# Patient Record
Sex: FEMALE | Race: ASIAN | NOT HISPANIC OR LATINO | Employment: FULL TIME | URBAN - METROPOLITAN AREA
[De-identification: names, ages, dates, MRNs, and addresses within clinical notes are randomized per-mention and may not be internally consistent; named-entity substitution may affect disease eponyms.]

---

## 2017-07-27 ENCOUNTER — OFFICE VISIT - HEALTHEAST (OUTPATIENT)
Dept: FAMILY MEDICINE | Facility: CLINIC | Age: 27
End: 2017-07-27

## 2017-07-27 DIAGNOSIS — R35.0 URINE FREQUENCY: ICD-10-CM

## 2020-02-17 ENCOUNTER — OFFICE VISIT - HEALTHEAST (OUTPATIENT)
Dept: FAMILY MEDICINE | Facility: CLINIC | Age: 30
End: 2020-02-17

## 2020-02-17 DIAGNOSIS — J06.9 VIRAL URI WITH COUGH: ICD-10-CM

## 2020-02-17 DIAGNOSIS — R07.0 THROAT PAIN: ICD-10-CM

## 2020-02-17 LAB — DEPRECATED S PYO AG THROAT QL EIA: NORMAL

## 2020-02-17 ASSESSMENT — MIFFLIN-ST. JEOR: SCORE: 1567.62

## 2020-02-18 LAB — GROUP A STREP BY PCR: NORMAL

## 2021-05-31 VITALS — WEIGHT: 168.9 LBS

## 2021-06-04 VITALS
BODY MASS INDEX: 41.33 KG/M2 | OXYGEN SATURATION: 96 % | WEIGHT: 205 LBS | HEART RATE: 95 BPM | HEIGHT: 59 IN | RESPIRATION RATE: 16 BRPM | DIASTOLIC BLOOD PRESSURE: 76 MMHG | SYSTOLIC BLOOD PRESSURE: 114 MMHG | TEMPERATURE: 98.2 F

## 2021-06-06 NOTE — PROGRESS NOTES
"Chief Complaint   Patient presents with     Sore Throat     x1wk, cough, stuffy nose, chest tightness       ASSESSMENT & PLAN:   Diagnoses and all orders for this visit:    Viral URI with cough    Throat pain  -     Rapid Strep A Screen-Throat  -     Group A Strep, RNA Direct Detection, Throat        MDM:  Vital signs, history, and presentation with normal lung sounds consistent with viral URI.    Supportive care discussed.  See discharge instructions below for specific recommendations given.    At the end of the encounter, I discussed results, diagnosis, medications. Discussed red flags for immediate return to clinic/ER, as well as indications for follow up if no improvement. Patient and/or caregiver understood and agreed to plan. Patient was stable for discharge.    SUBJECTIVE    HPI:  HPI  Michelle Ramirez presents to the walk-in clinic with   Chief Complaint   Patient presents with     Sore Throat     x1wk, cough, stuffy nose, chest tightness     Agree with above     Not pregnant.      No fevers     Associated with: ST 4/10    Symptoms started: 1 week ago    Denies: shortness of breath, chronic medical conditions.    Known exposures: none specific     See ROS for additional symptoms and/or pertinent negatives.       History obtained from the patient.  Friend interpreting at times.      No past medical history on file.    There are no active non-hospital problems to display for this patient.      No family history on file.    Social History     Tobacco Use     Smoking status: Never Smoker     Smokeless tobacco: Never Used   Substance Use Topics     Alcohol use: Not on file       Review of Systems   All other systems reviewed and are negative.      OBJECTIVE    Vitals:    02/17/20 1509   BP: 114/76   Patient Site: Right Arm   Patient Position: Sitting   Cuff Size: Adult Large   Pulse: 95   Resp: 16   Temp: 98.2  F (36.8  C)   TempSrc: Oral   SpO2: 96%   Weight: 205 lb (93 kg)   Height: 4' 11.45\" (1.51 m)       Physical " Exam  Constitutional:       Appearance: She is well-developed.   HENT:      Right Ear: Tympanic membrane normal.      Left Ear: Tympanic membrane normal.      Nose: Congestion and rhinorrhea present.      Mouth/Throat:      Pharynx: Posterior oropharyngeal erythema present.      Tonsils: No tonsillar exudate or tonsillar abscesses. 2+ on the right. 2+ on the left.   Eyes:      General:         Right eye: No discharge.         Left eye: No discharge.      Conjunctiva/sclera: Conjunctivae normal.   Cardiovascular:      Rate and Rhythm: Normal rate.   Pulmonary:      Effort: Pulmonary effort is normal.      Breath sounds: Normal breath sounds.   Musculoskeletal:      Comments: Normal gait    Lymphadenopathy:      Cervical: No cervical adenopathy.   Skin:     General: Skin is warm and dry.   Neurological:      Mental Status: She is alert and oriented to person, place, and time.   Psychiatric:         Mood and Affect: Mood normal.         Behavior: Behavior normal.         Thought Content: Thought content normal.         Judgment: Judgment normal.         Labs:  Recent Results (from the past 240 hour(s))   Rapid Strep A Screen-Throat   Result Value Ref Range    Rapid Strep A Antigen No Group A Strep detected, presumptive negative No Group A Strep detected, presumptive negative         Radiology:    No results found.    PATIENT INSTRUCTIONS:   Patient Instructions   Viral illness:     Robitussin DM cough syrup     Ibuprofen as needed for sore throat     Tea with 1 tsp honey      Cough drops     Return if fevers, shortness of breath, or throat pain worsening.

## 2021-06-06 NOTE — PATIENT INSTRUCTIONS - HE
Viral illness:     Robitussin DM cough syrup     Ibuprofen as needed for sore throat     Tea with 1 tsp honey      Cough drops     Return if fevers, shortness of breath, or throat pain worsening.

## 2021-06-12 NOTE — PROGRESS NOTES
Subjective:   Michelle Ramirez is a 27 y.o. female and [facility].  Roomed by: Laverne    Accompanied by  Sister    services provided by: Agency  [employer] [phone]   /Agency Name [employer]      Chief Complaint   Patient presents with     Possible UTI     x couple days.  STates she has urine frequency.   Denies any urinary urgency or dysuria. Has not had a UTI before. She does not have any primary care provider. She denies drinking caffeine drinks, usually drinks water. Denies any recent urinary frequency, urgency, dysuria, fever, chills or flank pain. Denies recent vaginal discharge or itching. Denies fatigue and say appetite has been normal. Denies changing either her clothes or bathing soap.   Denies nausea, vomiting, diarrhea or belly pain.     PMH - Denies any chronic medical conditions  PSH - non  FX - no HTN, DM, Cancer, CAD  Review of Systems  Const -  - see HPI  No Known Allergies  No current outpatient prescriptions on file.  There is no problem list on file for this patient.    Medical History Reviewed  Objective:     Vitals:    07/27/17 1652   BP: 120/70   Pulse: 69   Resp: 16   Temp: 99.6  F (37.6  C)   TempSrc: Oral   SpO2: 100%   Weight: 168 lb 14.4 oz (76.6 kg)   Gen - Pt in NAD  Flanks - non TTP  Results for orders placed or performed in visit on 07/27/17   Urinalysis-UC if Indicated   Result Value Ref Range    Color, UA Yellow Colorless, Yellow, Straw, Light Yellow    Clarity, UA Clear Clear    Glucose, UA Negative Negative    Bilirubin, UA Negative Negative    Ketones, UA Negative Negative    Specific Gravity, UA 1.020 1.005 - 1.030    Blood, UA Negative Negative    pH, UA 7.0 5.0 - 8.0    Protein, UA Negative Negative mg/dL    Urobilinogen, UA 1.0 E.U./dL 0.2 E.U./dL, 1.0 E.U./dL    Nitrite, UA Negative Negative    Leukocytes, UA Negative Negative   Microscopic not indicated  UC not indicated  Lab result discussed on day of visit.      Assessment -  Plan   Medical Decision Making -27-year-old new patient presenting with urinary frequency but no other symptoms.  Discussed that her urinalysis was negative for UTI.  Patient has no vaginal symptoms and answered negative to sensitive activities to either close or bathing soaps.  Discussed the patient that there are many things that can give a person the sensation of urinary frequency, and discussed those bladder irritants with her that are listed below.  Advised her to follow-up with 1 of the primary care providers for further concerns.    1. Urine frequency  - Urinalysis-UC if Indicated    At the conclusion of the encounter, assessment and plan were discussed. All questions were answered. The patient or guardian acknowledged understanding and was involved in the decision making regarding the overall care plan.    Patient Instructions   1. Follow up with one of the primary providers within the next 7-10 days - Linda Alexander, a nurse practitioner, is at [facility]  2. If symptoms are getting worse over time or you have any questions, call the clinic number    What is a bladder irritant?   A bladder irritant is any food, drink, or medication that causes the bladder to be irritated. Irritation can cause frequency (needing to urinate more often than normal), urgency (the sense of needing to urinate), bladder spasms, and even bladder pain. Bladder spasms can lead to urine leakage if there is a sudden urge, but not enough time to reach a toilet.     What are some examples of bladder irritants?   The following is a list of bladder irritants. The seven MOST IRRITATING are listed first:   *All alcoholic beverages   *Cigarettes/Tobacco   *Cola drinks   *Tea   *Artificial Sweeteners   *Chocolate   *Coffee     Other possible irritants include:   Fruits (and their juices):   cranberries, grapes, oranges, gennaro,   peaches, pineapple, plums, apples, and cantaloupe   Vegetables: onions, tomatoes, chilies, peppers    Milk/Dairy: aged cheese, sour cream, yogurt   Grains: rye & sourdough breads   Seasonings: spices & spicy food, especially peppers, acidic foods and beverages, walnuts & peanuts, vinegar

## 2021-08-20 ENCOUNTER — TELEPHONE (OUTPATIENT)
Dept: SCHEDULING | Facility: CLINIC | Age: 31
End: 2021-08-20

## 2021-08-20 ENCOUNTER — OFFICE VISIT (OUTPATIENT)
Dept: FAMILY MEDICINE | Facility: CLINIC | Age: 31
End: 2021-08-20
Payer: COMMERCIAL

## 2021-08-20 VITALS
WEIGHT: 197 LBS | OXYGEN SATURATION: 98 % | RESPIRATION RATE: 20 BRPM | TEMPERATURE: 98.1 F | HEIGHT: 60 IN | HEART RATE: 97 BPM | SYSTOLIC BLOOD PRESSURE: 128 MMHG | BODY MASS INDEX: 38.68 KG/M2 | DIASTOLIC BLOOD PRESSURE: 86 MMHG

## 2021-08-20 DIAGNOSIS — N92.6 MISSED PERIOD: Primary | ICD-10-CM

## 2021-08-20 LAB — HCG UR QL: POSITIVE

## 2021-08-20 PROCEDURE — 99203 OFFICE O/P NEW LOW 30 MIN: CPT | Mod: GC | Performed by: STUDENT IN AN ORGANIZED HEALTH CARE EDUCATION/TRAINING PROGRAM

## 2021-08-20 PROCEDURE — 81025 URINE PREGNANCY TEST: CPT | Performed by: STUDENT IN AN ORGANIZED HEALTH CARE EDUCATION/TRAINING PROGRAM

## 2021-08-20 ASSESSMENT — MIFFLIN-ST. JEOR: SCORE: 1540.09

## 2021-08-20 NOTE — PATIENT INSTRUCTIONS
- if you start feeling nauseous from pregnancy, you can take vitamin B6 and dramamine every 8 hours as needed. You can find these medications at your local pharmacy.   - if your pregnancy test comes back positive, we will call you and get you scheduled for your first OB/prenatal visit.

## 2021-08-20 NOTE — TELEPHONE ENCOUNTER
Left message #1 at [phone]. Postponing task out to a week and will try again. If patient returns call back, please help patient schedule an appointment per message below. Thanks!

## 2021-08-20 NOTE — PROGRESS NOTES
Preceptor Attestation:    I discussed the patient with the resident and evaluated the patient in person. I have verified the content of the note, which accurately reflects my assessment of the patient and the plan of care.   Supervising Physician:  Edwin Fernandez MD.

## 2021-08-20 NOTE — PROGRESS NOTES
There are no exam notes on file for this visit.    Assessment & Plan      1. Missed period  Patient had 3 positive UPTs at home and UPT today in clinic is positive. Patient is 6w5d today with SUSAN 4/10/22 based off LMP of 7/4/21. This is her first pregnancy. Discussed expectant management during first trimester, also discussed warning signs of miscarriage. Gave recommendations on how to manage nausea with OTC medications in AVS. Will send chart to OB RN and will get patient scheduled for first OB visit.   - HCG qualitative urine    Preventative Health: Patient due for covid-19 vaccine, discussed how this would be important to get if pregnant to best protect herself and her baby. Also due for HIV screening and pap that will be done at first OB visit.    20 minutes spent on the date of the encounter doing chart review, history and exam, documentation and further activities per the note    No follow-ups on file.    Benita Behm, MD PGY2  [facility] Medicine Residency  08/20/21    I precepted today with Dr. Fernandez.    Subjective   Colette Ramirez is a 31 year old who presents for the following health issues: missed menstruation.     HPI     LMP 7/4/21, has had 3 positive pregnancy tests at home, last positive test was yesterday. This would be a planned pregnancy and would be her first. Denies n/v, does have some cramping, fatigue and breast tenderness.    Review of Systems  Constitutional, HEENT, cardiovascular, pulmonary, gi and gu systems are negative, except as otherwise noted.    Objective   /86 (BP Location: Right arm, Patient Position: Sitting, Cuff Size: Adult Regular)   Pulse 97   Temp 98.1  F (36.7  C) (Oral)   Resp 20   Ht 1.524 m (5')   Wt 89.4 kg (197 lb)   LMP 07/04/2021   SpO2 98%   BMI 38.47 kg/m    Physical Exam    Constitutional: Awake, alert, cooperative, no apparent distress, and appears stated age.  Eyes: Lids and lashes normal, pupils equal, round and reactive to light, extra ocular  muscles intact, sclera clear, conjunctiva normal.  ENT: Normocephalic, without obvious abnormality, atraumatic, external ears without lesions.  Neck: Supple, symmetrical, trachea midline, skin normal.  Lungs: No increased work of breathing, good air exchange, clear to auscultation bilaterally, no crackles or wheezing.  Cardiovascular: Regular rate and rhythm, normal S1 and S2, no S3 or S4, and no murmur noted.  Abdomen: Normal bowel sounds, soft, non-distended, non-tender, no masses palpated, no hepatosplenomegally.  Musculoskeletal: Full range of motion noted. Tone is normal.  Neurologic: Awake, alert, oriented to name, place and time.  Cranial nerves II-XII are grossly intact. Gait is normal.  Neuropsychiatric: Normal affect, mood, orientation, memory and insight.  Skin: No visible rashes, erythema, pallor, petechia or purpura.    Results for orders placed or performed in visit on 08/20/21 (from the past 24 hour(s))   HCG qualitative urine   Result Value Ref Range    hCG Urine Qualitative Positive (A) Negative       ----- Service Performed and Documented by Resident or Fellow ------

## 2021-08-20 NOTE — TELEPHONE ENCOUNTER
Reason for Call:  Other appointment    Detailed comments: pregnancy confirmation- July 4th First date of LMP - has had positive home pregnancy test and positive test at another healthcare clinic    Phone Number Patient can be reached at: Home number on file [phone] (home)    Best Time: any    Can we leave a detailed message on this number? YES    Call taken on 8/20/2021 at 7:21 AM by Liseth Martin

## 2021-08-26 ENCOUNTER — APPOINTMENT (OUTPATIENT)
Dept: ULTRASOUND IMAGING | Facility: HOSPITAL | Age: 31
End: 2021-08-26
Attending: EMERGENCY MEDICINE
Payer: COMMERCIAL

## 2021-08-26 LAB
ABO/RH(D): NORMAL
ALBUMIN UR-MCNC: NEGATIVE MG/DL
ANION GAP SERPL CALCULATED.3IONS-SCNC: 9 MMOL/L (ref 5–18)
APPEARANCE UR: CLEAR
BILIRUB UR QL STRIP: NEGATIVE
BUN SERPL-MCNC: 9 MG/DL (ref 8–22)
CALCIUM SERPL-MCNC: 9.3 MG/DL (ref 8.5–10.5)
CHLORIDE BLD-SCNC: 105 MMOL/L (ref 98–107)
CO2 SERPL-SCNC: 22 MMOL/L (ref 22–31)
COLOR UR AUTO: COLORLESS
CREAT SERPL-MCNC: 0.67 MG/DL (ref 0.6–1.1)
ERYTHROCYTE [DISTWIDTH] IN BLOOD BY AUTOMATED COUNT: 12.4 % (ref 10–15)
GFR SERPL CREATININE-BSD FRML MDRD: >90 ML/MIN/1.73M2
GLUCOSE BLD-MCNC: 82 MG/DL (ref 70–125)
GLUCOSE UR STRIP-MCNC: NEGATIVE MG/DL
HCG SERPL-ACNC: 2686 MLU/ML (ref 0–4)
HCT VFR BLD AUTO: 41.9 % (ref 35–47)
HGB BLD-MCNC: 13.5 G/DL (ref 11.7–15.7)
HGB UR QL STRIP: ABNORMAL
KETONES UR STRIP-MCNC: 20 MG/DL
LEUKOCYTE ESTERASE UR QL STRIP: NEGATIVE
MCH RBC QN AUTO: 28.3 PG (ref 26.5–33)
MCHC RBC AUTO-ENTMCNC: 32.2 G/DL (ref 31.5–36.5)
MCV RBC AUTO: 88 FL (ref 78–100)
MUCOUS THREADS #/AREA URNS LPF: PRESENT /LPF
NITRATE UR QL: NEGATIVE
PH UR STRIP: 5.5 [PH] (ref 5–7)
PLATELET # BLD AUTO: 269 10E3/UL (ref 150–450)
POTASSIUM BLD-SCNC: 3.4 MMOL/L (ref 3.5–5)
RBC # BLD AUTO: 4.77 10E6/UL (ref 3.8–5.2)
RBC URINE: 1 /HPF
SODIUM SERPL-SCNC: 136 MMOL/L (ref 136–145)
SP GR UR STRIP: 1.01 (ref 1–1.03)
SPECIMEN EXPIRATION DATE: NORMAL
SQUAMOUS EPITHELIAL: 1 /HPF
UROBILINOGEN UR STRIP-MCNC: <2 MG/DL
WBC # BLD AUTO: 10 10E3/UL (ref 4–11)
WBC URINE: 1 /HPF

## 2021-08-26 PROCEDURE — 84702 CHORIONIC GONADOTROPIN TEST: CPT | Performed by: EMERGENCY MEDICINE

## 2021-08-26 PROCEDURE — 82374 ASSAY BLOOD CARBON DIOXIDE: CPT | Performed by: EMERGENCY MEDICINE

## 2021-08-26 PROCEDURE — 86900 BLOOD TYPING SEROLOGIC ABO: CPT | Performed by: STUDENT IN AN ORGANIZED HEALTH CARE EDUCATION/TRAINING PROGRAM

## 2021-08-26 PROCEDURE — 99284 EMERGENCY DEPT VISIT MOD MDM: CPT | Mod: 25

## 2021-08-26 PROCEDURE — 36415 COLL VENOUS BLD VENIPUNCTURE: CPT | Performed by: STUDENT IN AN ORGANIZED HEALTH CARE EDUCATION/TRAINING PROGRAM

## 2021-08-26 PROCEDURE — 81001 URINALYSIS AUTO W/SCOPE: CPT | Performed by: EMERGENCY MEDICINE

## 2021-08-26 PROCEDURE — 85027 COMPLETE CBC AUTOMATED: CPT | Performed by: EMERGENCY MEDICINE

## 2021-08-26 PROCEDURE — 36415 COLL VENOUS BLD VENIPUNCTURE: CPT | Performed by: EMERGENCY MEDICINE

## 2021-08-26 PROCEDURE — 86900 BLOOD TYPING SEROLOGIC ABO: CPT | Performed by: EMERGENCY MEDICINE

## 2021-08-26 PROCEDURE — 76801 OB US < 14 WKS SINGLE FETUS: CPT

## 2021-08-26 ASSESSMENT — MIFFLIN-ST. JEOR: SCORE: 1545.5

## 2021-08-27 ENCOUNTER — HOSPITAL ENCOUNTER (EMERGENCY)
Facility: HOSPITAL | Age: 31
Discharge: HOME | End: 2021-08-27
Attending: EMERGENCY MEDICINE | Admitting: EMERGENCY MEDICINE
Payer: COMMERCIAL

## 2021-08-27 VITALS
DIASTOLIC BLOOD PRESSURE: 91 MMHG | SYSTOLIC BLOOD PRESSURE: 139 MMHG | HEART RATE: 78 BPM | WEIGHT: 198.19 LBS | BODY MASS INDEX: 38.91 KG/M2 | RESPIRATION RATE: 16 BRPM | TEMPERATURE: 98 F | OXYGEN SATURATION: 98 % | HEIGHT: 60 IN

## 2021-08-27 DIAGNOSIS — N93.9 VAGINAL BLEEDING: ICD-10-CM

## 2021-08-27 DIAGNOSIS — Z34.90 EARLY STAGE OF PREGNANCY: ICD-10-CM

## 2021-08-27 LAB
ABO/RH(D): NORMAL
HOLD SPECIMEN: NORMAL
HOLD SPECIMEN: NORMAL
SPECIMEN EXPIRATION DATE: NORMAL

## 2021-08-27 RX ORDER — PYRIDOXINE HCL (VITAMIN B6) 25 MG
25 TABLET ORAL 2 TIMES DAILY PRN
Qty: 14 TABLET | Refills: 0 | Status: CANCELLED | OUTPATIENT
Start: 2021-08-27

## 2021-08-27 NOTE — TELEPHONE ENCOUNTER
Called patient to schedule appt, she declined. Stated that she is planning to go to [facility]. Completing task.

## 2021-08-27 NOTE — ED PROVIDER NOTES
EMERGENCY DEPARTMENT ENCOUNTER      NAME: Colette Ramirez  AGE: 31 year old female  YOB: 1990  MRN: [id number]  EVALUATION DATE & TIME: 8/27/2021  1:04 AM    PCP: Behm, Benita Kay    ED PROVIDER: Sara Zafar M.D.      Chief Complaint   Patient presents with     Vaginal Bleeding         FINAL IMPRESSION:  No diagnosis found.    MEDICAL DECISION MAKING:    Pertinent Labs & Imaging studies reviewed. (See chart for details)  ED Course as of Aug 27 0246   Fri Aug 27, 2021   0244 Afebrile.  Vital signs are unremarkable.  Patient is coming in with very mild vaginal bleeding.  She is approximately 6 weeks by her estimation.  She has been seen by OB.  She is not having any abdominal pain or cramping.  She occasionally gets some morning sickness.  No other vaginal discharge.  No dysuria.  No fevers no chills.Physical exam for patient here completely unremarkable with out current vaginal bleeding.  Cervix is closed without tenderness.  No discharge.Labs here unremarkable, beta is at 2600.  Ultrasound shows possible dual pregnancy versus small subchorionic hemorrhage.  Discussed findings with patient.  She currently has a appointment with her OB/GYN coming up for this coming Wednesday.  Discussed she should take pelvic rest.  She should call the OB/GYN office later today to discuss repeat labs on Monday morning.  Otherwise patient here feels comfortable.  Did prescribe some B6 for morning sickness as needed.  Otherwise she is already on prenatals.  Will discharge at this time with close follow-up with OB/GYN.                ED COURSE:  1:16 AM I performed my initial history and physical exam as well as discussed ED course and plan.  1:30 AM I updated the patient on her imaging and laboratory results. Recommend that the patient moves her OBGYN appointment to Monday for repeat bloodwork  1:53 AM I discharged the patient    The importance of close follow up was discussed. We reviewed warning signs and symptoms, and I  instructed Ms. Ramirez to return to the emergency department immediately if she develops any new or worsening symptoms. I provided additional verbal discharge instructions. Ms. Ramirez expressed understanding and agreement with this plan of care, her questions were answered, and she was discharged in stable condition.     MEDICATIONS GIVEN IN THE EMERGENCY:  Medications - No data to display    NEW PRESCRIPTIONS STARTED AT TODAY'S ER VISIT:  New Prescriptions    No medications on file          =================================================================    HPI    Patient information was obtained from: Patient    Use of : Yes(In Person) - Language:Derrickong, : Patient         Colette Ramirez is a 31 year old female who presents with her  and a history of pregnancy present to the Ed via private car evaluation of vaginal bleeding.     Patient reports that she went to the bathroom the other day and noticed dark brown appearing blood after wiping her vagina. She is experiencing some minor cramping and pain but nothing severe. Patient is 5 weeks pregnant and worried about miscarriage. She saw her OBGYN provider last week and has a follow up appointment this coming Wednesday(5 days).     Shx: Patient is sexually active, denies ever using alcohol.       REVIEW OF SYSTEMS   Review of Systems   Genitourinary: Positive for vaginal bleeding (dark brown appearing).        Minor abdominal cramps   All other systems reviewed and are negative.        PAST MEDICAL HISTORY:  No past medical history on file.    PAST SURGICAL HISTORY:  No past surgical history on file.    CURRENT MEDICATIONS:    No current facility-administered medications for this encounter.  No current outpatient medications on file.    ALLERGIES:  No Known Allergies    FAMILY HISTORY:  No family history on file.    SOCIAL HISTORY:   Social History     Socioeconomic History     Marital status:      Spouse name: Not on file     Number  of children: Not on file     Years of education: Not on file     Highest education level: Not on file   Occupational History     Not on file   Tobacco Use     Smoking status: Never Smoker     Smokeless tobacco: Never Used   Substance and Sexual Activity     Alcohol use: Never     Drug use: Never     Sexual activity: Not on file   Other Topics Concern     Not on file   Social History Narrative     Not on file     Social Determinants of Health     Financial Resource Strain:      Difficulty of Paying Living Expenses:    Food Insecurity:      Worried About Running Out of Food in the Last Year:      Ran Out of Food in the Last Year:    Transportation Needs:      Lack of Transportation (Medical):      Lack of Transportation (Non-Medical):    Physical Activity:      Days of Exercise per Week:      Minutes of Exercise per Session:    Stress:      Feeling of Stress :    Social Connections:      Frequency of Communication with Friends and Family:      Frequency of Social Gatherings with Friends and Family:      Attends Judaism Services:      Active Member of Clubs or Organizations:      Attends Club or Organization Meetings:      Marital Status:    Intimate Partner Violence:      Fear of Current or Ex-Partner:      Emotionally Abused:      Physically Abused:      Sexually Abused:        PHYSICAL EXAM:    Vitals: /80   Pulse 82   Temp 98  F (36.7  C) (Oral)   Resp 16   Ht 1.524 m (5')   Wt 89.9 kg (198 lb 3.1 oz)   LMP 07/10/2021   SpO2 99%   BMI 38.71 kg/m     General:. Alert and interactive, comfortable appearing.  HENT: Oropharynx without erythema or exudates. MMM.  TMs clear bilaterally.  Eyes: Pupils mid-sized and equally reactive.   Neck: Full AROM.  No midline tenderness to palpation.  Cardiovascular: Regular rate and rhythm. Peripheral pulses 2+ bilaterally.  Chest/Pulmonary: Normal work of breathing. Lung sounds clear and equal throughout, no wheezes or crackles. No chest wall tenderness or  deformities.  Abdomen: Soft, nondistended. Nontender without guarding or rebound.  Back/Spine: No CVA or midline tenderness.  Extremities: Normal ROM of all major joints. No lower extremity edema.   Skin: Warm and dry. Normal skin color.   Neuro: Speech clear. CNs grossly intact. Moves all extremities appropriately. Strength and sensation grossly intact to all extremities.   Psych: Normal affect/mood, cooperative, memory appropriate.  OB: Cervix is closed without tenderness.  No discharge.     LAB:  All pertinent labs reviewed and interpreted.  Labs Ordered and Resulted from Time of ED Arrival Up to the Time of Departure from the ED   BASIC METABOLIC PANEL - Abnormal; Notable for the following components:       Result Value    Potassium 3.4 (*)     All other components within normal limits   HCG QUANTITATIVE PREGNANCY - Abnormal; Notable for the following components:    hCG Quantitative 2,686 (*)     All other components within normal limits   ROUTINE UA WITH MICROSCOPIC REFLEX TO CULTURE - Abnormal; Notable for the following components:    Ketones Urine 20  (*)     Blood Urine 0.06 mg/dL (*)     Mucus Urine Present (*)     All other components within normal limits    Narrative:     Urine Culture not indicated   CBC WITH PLATELETS - Normal   EXTRA BLUE TOP TUBE   EXTRA RED TOP TUBE   PATIENT CARE ORDER   ABO AND RH   ABO AND RH   EXTRA TUBE    Narrative:     The following orders were created for panel order [place] Draw.                  Procedure                               Abnormality         Status                                     ---------                               -----------         ------                                     Extra Blue Top Tube[[id number]]                              Final result                               Extra Red Top Tube[[id number]]                               Final result                                                 Please view results for these tests on the individual  orders.       RADIOLOGY:  US OB <14 Weeks w Transvaginal Single   Final Result   IMPRESSION:    1.  Intrauterine gestational sac at 4 weeks 6 days, EDC 04/10/2022. No visible fetal pole. There is a second hypoechoic structure similar in size to the gestational sac. Uncertain if this is a second gestational sac or small subchorionic hemorrhage.    Serial beta hCG and short-term ultrasound follow-up necessary to evaluate for fetal pole/viable pregnancy and reassess for potential second gestational sac versus subchorionic hemorrhage.                PROCEDURES:         I, Yevgeniy Chavez, am serving as a scribe to document services personally performed by Dr. Sara Zafar  based on my observation and the provider's statements to me. I, Sara Zafar MD attest that Yevgeniy Chavez is acting in a scribe capacity, has observed my performance of the services and has documented them in accordance with my direction.      Sara Zafar M.D.  Emergency Medicine  [facility] EMERGENCY DEPARTMENT  [address] [place] [id number]  [phone]  Dept: [phone]\     Elin Zafar MD  08/27/21 0259

## 2021-08-27 NOTE — ED TRIAGE NOTES
First pregnancy, approximately 6weeks. Was wiping today and noticed brown on toilet paper. No cramping noted

## 2021-08-27 NOTE — DISCHARGE INSTRUCTIONS
As discussed you should call your OB/GYN later today to arrange for recheck of labs this coming Monday.  Please return for any new or worsening symptoms.

## 2021-09-01 ENCOUNTER — ALLIED HEALTH/NURSE VISIT (OUTPATIENT)
Dept: FAMILY MEDICINE | Facility: CLINIC | Age: 31
End: 2021-09-01
Payer: COMMERCIAL

## 2021-09-01 ENCOUNTER — OFFICE VISIT (OUTPATIENT)
Dept: FAMILY MEDICINE | Facility: CLINIC | Age: 31
End: 2021-09-01
Payer: COMMERCIAL

## 2021-09-01 VITALS
RESPIRATION RATE: 20 BRPM | OXYGEN SATURATION: 98 % | TEMPERATURE: 98.3 F | BODY MASS INDEX: 38.99 KG/M2 | DIASTOLIC BLOOD PRESSURE: 79 MMHG | HEART RATE: 83 BPM | WEIGHT: 198.6 LBS | HEIGHT: 60 IN | SYSTOLIC BLOOD PRESSURE: 115 MMHG

## 2021-09-01 DIAGNOSIS — O09.90 SUPERVISION OF HIGH RISK PREGNANCY, ANTEPARTUM: ICD-10-CM

## 2021-09-01 DIAGNOSIS — Z34.00 FIRST PREGNANCY, UNSPECIFIED TRIMESTER: ICD-10-CM

## 2021-09-01 DIAGNOSIS — O09.91 HIGH-RISK PREGNANCY IN FIRST TRIMESTER: Primary | ICD-10-CM

## 2021-09-01 DIAGNOSIS — O20.9 VAGINAL BLEEDING BEFORE 22 WEEKS GESTATION: ICD-10-CM

## 2021-09-01 LAB
ABO/RH(D): NORMAL
ANTIBODY SCREEN: NEGATIVE
CLUE CELLS: ABNORMAL
ERYTHROCYTE [DISTWIDTH] IN BLOOD BY AUTOMATED COUNT: 12.3 % (ref 10–15)
HCG SERPL-ACNC: 8658 MLU/ML (ref 0–4)
HCT VFR BLD AUTO: 39.7 % (ref 35–47)
HGB BLD-MCNC: 13.3 G/DL (ref 11.7–15.7)
HIV 1+2 AB+HIV1 P24 AG SERPL QL IA: NEGATIVE
MCH RBC QN AUTO: 28.9 PG (ref 26.5–33)
MCHC RBC AUTO-ENTMCNC: 33.5 G/DL (ref 31.5–36.5)
MCV RBC AUTO: 86 FL (ref 78–100)
PLATELET # BLD AUTO: 259 10E3/UL (ref 150–450)
RBC # BLD AUTO: 4.61 10E6/UL (ref 3.8–5.2)
SPECIMEN EXPIRATION DATE: NORMAL
SPECIMEN EXPIRATION DATE: NORMAL
TRICHOMONAS, WET PREP: ABNORMAL
WBC # BLD AUTO: 9.1 10E3/UL (ref 4–11)
WBC'S/HIGH POWER FIELD, WET PREP: ABNORMAL
YEAST, WET PREP: ABNORMAL

## 2021-09-01 PROCEDURE — 87340 HEPATITIS B SURFACE AG IA: CPT | Performed by: STUDENT IN AN ORGANIZED HEALTH CARE EDUCATION/TRAINING PROGRAM

## 2021-09-01 PROCEDURE — 86706 HEP B SURFACE ANTIBODY: CPT | Performed by: STUDENT IN AN ORGANIZED HEALTH CARE EDUCATION/TRAINING PROGRAM

## 2021-09-01 PROCEDURE — 99000 SPECIMEN HANDLING OFFICE-LAB: CPT | Performed by: STUDENT IN AN ORGANIZED HEALTH CARE EDUCATION/TRAINING PROGRAM

## 2021-09-01 PROCEDURE — 86787 VARICELLA-ZOSTER ANTIBODY: CPT | Performed by: STUDENT IN AN ORGANIZED HEALTH CARE EDUCATION/TRAINING PROGRAM

## 2021-09-01 PROCEDURE — 87389 HIV-1 AG W/HIV-1&-2 AB AG IA: CPT | Performed by: STUDENT IN AN ORGANIZED HEALTH CARE EDUCATION/TRAINING PROGRAM

## 2021-09-01 PROCEDURE — 86901 BLOOD TYPING SEROLOGIC RH(D): CPT | Performed by: STUDENT IN AN ORGANIZED HEALTH CARE EDUCATION/TRAINING PROGRAM

## 2021-09-01 PROCEDURE — 84702 CHORIONIC GONADOTROPIN TEST: CPT | Performed by: STUDENT IN AN ORGANIZED HEALTH CARE EDUCATION/TRAINING PROGRAM

## 2021-09-01 PROCEDURE — 85027 COMPLETE CBC AUTOMATED: CPT | Performed by: STUDENT IN AN ORGANIZED HEALTH CARE EDUCATION/TRAINING PROGRAM

## 2021-09-01 PROCEDURE — 86762 RUBELLA ANTIBODY: CPT | Performed by: STUDENT IN AN ORGANIZED HEALTH CARE EDUCATION/TRAINING PROGRAM

## 2021-09-01 PROCEDURE — 86780 TREPONEMA PALLIDUM: CPT | Performed by: STUDENT IN AN ORGANIZED HEALTH CARE EDUCATION/TRAINING PROGRAM

## 2021-09-01 PROCEDURE — 87086 URINE CULTURE/COLONY COUNT: CPT | Performed by: STUDENT IN AN ORGANIZED HEALTH CARE EDUCATION/TRAINING PROGRAM

## 2021-09-01 PROCEDURE — G0123 SCREEN CERV/VAG THIN LAYER: HCPCS | Performed by: STUDENT IN AN ORGANIZED HEALTH CARE EDUCATION/TRAINING PROGRAM

## 2021-09-01 PROCEDURE — 86900 BLOOD TYPING SEROLOGIC ABO: CPT | Performed by: STUDENT IN AN ORGANIZED HEALTH CARE EDUCATION/TRAINING PROGRAM

## 2021-09-01 PROCEDURE — 87591 N.GONORRHOEAE DNA AMP PROB: CPT | Performed by: STUDENT IN AN ORGANIZED HEALTH CARE EDUCATION/TRAINING PROGRAM

## 2021-09-01 PROCEDURE — 87210 SMEAR WET MOUNT SALINE/INK: CPT | Performed by: STUDENT IN AN ORGANIZED HEALTH CARE EDUCATION/TRAINING PROGRAM

## 2021-09-01 PROCEDURE — 99207 PR NO BILLABLE SERVICE THIS VISIT: CPT

## 2021-09-01 PROCEDURE — 36415 COLL VENOUS BLD VENIPUNCTURE: CPT | Performed by: STUDENT IN AN ORGANIZED HEALTH CARE EDUCATION/TRAINING PROGRAM

## 2021-09-01 PROCEDURE — 86850 RBC ANTIBODY SCREEN: CPT | Performed by: STUDENT IN AN ORGANIZED HEALTH CARE EDUCATION/TRAINING PROGRAM

## 2021-09-01 PROCEDURE — 99214 OFFICE O/P EST MOD 30 MIN: CPT | Mod: GC | Performed by: STUDENT IN AN ORGANIZED HEALTH CARE EDUCATION/TRAINING PROGRAM

## 2021-09-01 PROCEDURE — 83655 ASSAY OF LEAD: CPT | Mod: 90 | Performed by: STUDENT IN AN ORGANIZED HEALTH CARE EDUCATION/TRAINING PROGRAM

## 2021-09-01 RX ORDER — PRENATAL VIT/IRON FUM/FOLIC AC 27MG-0.8MG
1 TABLET ORAL DAILY
Qty: 90 TABLET | Refills: 3 | Status: SHIPPED | OUTPATIENT
Start: 2021-09-01

## 2021-09-01 ASSESSMENT — MIFFLIN-ST. JEOR: SCORE: 1547.34

## 2021-09-01 NOTE — PROGRESS NOTES
Past Medical History     Do you have a history of any of the following medical conditions?    Condition No/Yes/Details   Hypertension No    Heart disease, mitral valve prolapse, rheumatic fever No    Asthma or another chronic lung disease No    An autoimmune disorder No    Kidney disease No    Frequent urinary tract infections No    Epilepsy, seizures, or spells No    Migraine headaches  YES once a month   Stroke, loss of sensation/function, seizures, or other neuro problem No    Diabetes No    Thyroid problems or have you taken thyroid medication No    Hepatitis, liver disease, jaundice No    Blood clots, phlebitis, pulmonary embolism or varicose veins No    Excessive bleeding after surgery or dental work No    Do you have more bleeding than other women after a cut or a scratch? No    Anemia No    Blood transfusions No         Would you refuse a blood transfusion?       No   If yes, then ask next question. If no, skip next question.   Would you rather die than receive a blood transfusion? No    Breast problems No    Have you ever ? No plans to breast feed   Abnormalities of the uterus No    Abnormal pap smear No    Have you ever been treated for depression? No    Are you having problems with crying spells or loss of self-esteem? No    Have you ever required psychiatric care? No    Have you been physically, sexually, or emotionally hurt by someone? No    Have you been in a major accident or suffered serious trauma? No    Have you ever had any gynecological surgical procedures such as cervical conization, LEEP, laser treatment, cryosurgery of the cervix, or a dilatation and curettage? No    Have you had any other surgical procedures? No         Have you ever had any complications from anesthesia? No    Have you ever been hospitalized for a nonsurgical reason? No             Substance use and exposure     Does anyone in your home smoke? No    Do you use tobacco products or betel nut? No    Do you drink  beer, wine, or hard liquor? No    Do you use any of the following: marijuana, speed, cocaine, heroin, hallucinogens, or other drugs? No               Symptoms since Last Menstrual Period     Do you currently have any of the following symptoms: No/Yes/Details        Abdominal pain No         Blood in the stools or urine No         Chest pain No         Shortness of breath No         coughing or vomiting up blood No         Your heart racing or skipping beats No         Nausea or vomiting  YES nausea in the morning        Pain on urination No         Vaginal discharge or bleeding  YES vaginal discharge brown spotting x's 1 week and vaginal itching               Genetic Screening          Is the patient 35 years or older? No      Do you have a history of any of the following No/Yes/Details        A metabolic disorder (e.g. Insulin-dependent DM, PKU) No         Recurrent pregnancy loss or still birth No      Do you, the baby's father, or anyone in your families have          Thalassemia AND MCV <80 No         Hemophilia No         Neural tube defect No }        Congenital heart defect No         Sickle cell disease or trait No         Muscular dystrophy No         Cystic fibrosis No         Mental retardation or autism No         Down's syndrome No         Henri-Sach's disease No         Pinellas's chorea No         Any other inherited genetic or chromosomal disorder No         A child with birth defects not listed above No                Infection History     Ever treated for tuberculosis or had a positive skin test No    Ever had genital herpes (or has your partner) No    Had a rash or viral illness since LMP No    Ever had a sexually transmitted infection No    Ever had chicken pox or the vaccine No    Have you had a sexual partner who is HIV positive No    Ever had any other serious infectious disease No                Risk Assessment     Average Risk Category  No significant risk factors: Yes    At Risk  Category (up to 3)  Teen pregnancy: No  Poor social situation: No  Domestic abuse: No  Financial difficulties: No  Smoker: No  H/O  deliver: No  H/O drug abuse: No  Non-English speaking: No  Advanced maternal age: No  GDM risks: Yes  Previous C/S: No  H/O PIH: No  H/O STIs: No  H/O mental health concerns: No  Onset care > 20 weeks: No      High Risk Category (4 or more At Risk or)  Diabetes/GDM: No  Multiple gestation: No  Chronic hypertension: No  Significant hx of asthma: No  Fetal demise > 20 weeks: No  Positive tox screen: No  Current mental health treatment: No      Risk: At Risk   Date determined: 21

## 2021-09-01 NOTE — PROGRESS NOTES
Preceptor attestation:  Vital signs reviewed: /79   Pulse 83   Temp 98.3  F (36.8  C) (Oral)   Resp 20   Ht 1.524 m (5')   Wt 90.1 kg (198 lb 9.6 oz)   LMP 07/04/2021 (Exact Date)   SpO2 98%   BMI 38.79 kg/m      Patient seen, evaluated, and discussed with the resident.  I have verified the content of the note, which accurately reflects my assessment of the patient and the plan of care.    Supervising physician: Justyna Hernandez MD  [facility]

## 2021-09-01 NOTE — PATIENT INSTRUCTIONS
- take 81 mg aspirin and prenatal vitamin daily. I sent a prescription for this to your pharmacy but these are also available right off the shelf at any pharmacy without a prescription if needed.   - if your nausea becomes more severe and you want to take a medication for it, vitamin B6 and dramamine work well. These medications you don't need a prescription for, you can  off the shelf at any local pharmacy. If these do not work, come in for a visit and we can get you more medications for nausea.   - someone will call to schedule follow up dating ultrasound for 2 weeks (this will be scheduled for a Monday visit)  - Follow up with Dr. Benita Behm for routine prenatal visit in 4 weeks

## 2021-09-01 NOTE — PROGRESS NOTES
First Obstetric Visit           Assessment and Plan     Colette was seen today for prenatal care. She is a 31 year old  at 8w3d weeks of pregnancy with SUSAN of Apr 10, 2022 by LMP of 2021 (exact date).    Diagnoses and all orders for this visit:  High-risk pregnancy in first trimester  Patient experienced bleeding/spotting for 3 days and says this has now resolved for past few days but residual blood seen on pelvic exam. US in ED was concerning for early miscarriage as SUSAN ~2 weeks less than SUSAN based off of LMP, she has normal cycles (low concern for late ovulation), no fetal pole seen, subchorionic hemorrhage visualized along with ?second subchorionic hemorrhage vs second gestational sac. HcG quant on  during ED visit was 2686, will repeat today. If HcG quant today is equivocal, may need to repeat in 2 days. Repeat dating US in 2 weeks if pregnancy remains viable, US done in ED questionable for second gestational sac vs second subchorionic hemorrhage. Patient already taking prenatal vitamin, will start ASA for BMI >30 and did have elevated BP at previous clinic visit.   -     Chlamydia/Gono Amplified  -     Culture Urine  -     GYN Cytology  -     Wet Prep  -     hCG Quantitative Pregnancy  -     aspirin (ASA) 81 MG EC tablet; Take 1 tablet (81 mg) by mouth daily  -     Prenatal Vit-Fe Fumarate-FA (PRENATAL MULTIVITAMIN W/IRON) 27-0.8 MG tablet; Take 1 tablet by mouth daily  -     Antibody Screen  -     ABO/Rh Type-HML  -     Alfredo Zoster Imm Status Holly  -     Hepatitis B Surface Ab  -     Syphilis Screen Cascade  -     Rubella Antibody IgG  -     Lead, Blood  -     HIV Ag/Ab Screen Cascade  -     Hepatitis B Surface Ag  -     CBC with Plt  -     US OB <14 WKS SINGLE OR FIRST GESTATION; Future    Pregnancy Risk Assessment: High Risk pregnancy  Discussed high risk conditions as follows: early pregnancy bleeding, BMI >30     - Dating US obtained and dating confirmed?  Had US in ED on  for bleeding in  first trimester which showed intrauterine gestational sac at 4w6d but no visible fetal pole, subchorionic hemorrhage, and questionable second gestational sac vs small chorionic hemorrhage.   - Taking PNV/Folate? Yes     - Ordered new OB labs: blood type and antibody screen, HIV, VDRL, hep B, rubella, UA/UC, gonorrhea/chlamydia, Pap smear, Hepatitis B immunity, Varicella immunity, Wet prep, and HcG quantitative done today.    - Will need to discussed genetic screening and screening for sickle cell anemia at follow up visit if viable pregnancy    - Discussed early screening for gestational diabetes. The patient does have a history of GDM, BMI>30, h/o prediabetes/glucose intolerance, first degree relative with GDM or DM, or chronic HTN, so WILL obtain early GCT or A1c.    - The patient does have BMI >30, h/o severe, early preeclampsia with delivery <34weeks, preeclampsia in more than one pregnancy, pre-gestational diabetes, chronic hypertension, renal disease, or autoimmune disease so WILL start low dose aspirin (81mg), but WILL NOT start and calcium supplementation (1g-1.5g/day elemental = 2500mg- 3750mg/day Calcium carbonate) to prevent preeclampsia.    - The patient  does not have a history of spontaneous  birth so  WILL NOT consider progesterone starting at 16-20 weeks and/or serial transvaginal cervical length ultrasounds from 16-24 weeks.    - Prenatal vitamins ordered.    Counseling given:   - Follow up in 4 weeks for return OB visit.  - Recommended weight gain for pregnancy: 11-20 lbs (pregravid BMI >30)    - Instructed on best evidence for: healthy diet and foods to avoid; exercise and activity during pregnancy; avoiding exposure to toxoplasmosis; safe use of seatbelts during pregnancy; and maintenance of a generally healthy lifestyle  -Patient to see OB educator/ RN today and/or next visit.    Discussed the harms, benefits, side effects and alternative therapies for current prescribed and OTC  medications.      Patient Instructions   - take 81 mg aspirin and prenatal vitamin daily. I sent a prescription for this to your pharmacy but these are also available right off the shelf at any pharmacy without a prescription if needed.   - if your nausea becomes more severe and you want to take a medication for it, vitamin B6 and dramamine work well. These medications you don't need a prescription for, you can  off the shelf at any local pharmacy. If these do not work, come in for a visit and we can get you more medications for nausea.   - someone will call to schedule follow up dating ultrasound for 2 weeks (this will be scheduled for a Monday visit)  - Follow up with Dr. Benita Behm for routine prenatal visit in 4 weeks      Options for treatment and follow-up care were reviewed with the patient and/or guardian. Colette Ramirez and/or guardian engaged in the decision making process and verbalized understanding of the options discussed and agreed with the final plan.    Review of prior external note(s) from - previous clinic visit and recent ED visit.   40 minutes spent on the date of the encounter doing chart review, history and exam, documentation and further activities per the note    Benita Behm, MD PGY2  [facility]  21    I precepted today with Dr. Hernandez.         HPI       Colette Ramirez is a 31 year old woman who presents for an initial prenatal visit at 8w3d weeks of pregnancy with SUSAN of Apr 10, 2022 by LMP of Patient's last menstrual period was 2021 (exact date)..      She has had bleeding - presented to ED for this and had ultrasound that showed small subchorionic hemorrhage, she had spotting for 3 days, now resolved.   She has had mild nausea - not needing any medications.   Weight loss has not occurred, she has gained weight.     This was a planned pregnancy.     OTHER CONCERNS: none            Labor Risk Assessment     Is the patient's age <18 or >40?      No  Patint's BMI is Body mass index is 38.79 kg/m .   Does patient have a BMI < 18.5?     No  Prior delivery within 6 months?      No  Ever delivered prior to 37 weeks gestation?  No  Pregnancy occur via In Vitro Fertilization?   No  Are you carrying twins?       TBD (please see A&P for details)    The patient has the following additional risk factors for  labor:   none     Labor Risk Summary:  Pt is high risk for  Labor  No           Past Medical History     History reviewed. No pertinent past medical history.  See nurse history note, reviewed in detail.           Current Medications      Current Outpatient Medications   Medication Sig Dispense Refill     aspirin (ASA) 81 MG EC tablet Take 1 tablet (81 mg) by mouth daily 90 tablet 3     Prenatal Vit-Fe Fumarate-FA (PRENATAL MULTIVITAMIN W/IRON) 27-0.8 MG tablet Take 1 tablet by mouth daily 90 tablet 3           Review of Systems      ROS:  No - Headache  No - Changes in vision  No - Chest Pain  No - Shortness of Breath  YES - Nausea   No - Vomiting  YES, occasional - Abdominal pain   No - Contractions  No - Dysuria   YES - Vaginal Discharge    YES - Vaginal bleeding   No - Loss of Fluid   No - Extremity swelling     ====================================================           Physical Exam      /79   Pulse 83   Temp 98.3  F (36.8  C) (Oral)   Resp 20   Ht 1.524 m (5')   Wt 90.1 kg (198 lb 9.6 oz)   LMP 2021 (Exact Date)   SpO2 98%   BMI 38.79 kg/m    GENERAL: healthy, alert and no distress  EYES: Eyes grossly normal to inspection, extraocular movements - intact, and PERRL  HENT: ear canals- normal; TMs- normal; Nose- normal; Mouth- no ulcers, no lesions  NECK: no tenderness, no adenopathy, no asymmetry, no masses, no stiffness; thyroid- normal to palpation  RESP: lungs clear to auscultation - no rales, no rhonchi, no wheezes  CV: regular rates and rhythm, normal S1 S2, no S3 or S4 and no murmur, no click or rub  -  ABDOMEN: soft, no tenderness, no  hepatosplenomegaly, no masses, normal bowel sounds, no scars noted**  MS: extremities- no gross deformities noted, no edema  SKIN: no suspicious lesions, no rashes  NEURO: strength and tone- normal, sensory exam- grossly normal, mentation- intact, speech- normal, reflexes- symmetric  PSYCH: Alert and oriented times 3; coherent speech, normal rate and volume, able to articulate logical thoughts, able to abstract reason, no tangential thoughts, no hallucinations or delusions. Affect is normal.  LYMPHATICS: ant. cervical- normal, post. cervical- normal, axillary- normal, supraclavicular- normal, inguinal- normal  GENITALIA:  no lesions noted  VAGINA:  Pink, normal rugae and discharge white and curd-like   CERVIX:  smooth, without discharge or CMT and parous os,   firm/ closed 3 cm long.  UTERUS: nontender 7 weeks in size.  ADNEXA: Without masses or tenderness    Past labs reviewed:  B POS  Varicella immune unknown  Hepatitis B immune unknown  Last pap NA.  She is due for this today.    =========================================

## 2021-09-02 LAB
BACTERIA UR CULT: NO GROWTH
BKR LAB AP GYN ADEQUACY: NORMAL
BKR LAB AP GYN INTERPRETATION: NORMAL
BKR LAB AP HPV REFLEX: NORMAL
BKR LAB AP LMP: NORMAL
BKR LAB AP PREVIOUS ABNORMAL: NORMAL
HBV SURFACE AB SERPLBLD QL IA.RAPID: POSITIVE
HBV SURFACE AG SERPL QL IA: NONREACTIVE
N GONORRHOEA DNA SPEC QL NAA+PROBE: NEGATIVE
PATH REPORT.COMMENTS IMP SPEC: NORMAL
PATH REPORT.RELEVANT HX SPEC: NORMAL
RUBV IGG SERPL QL IA: POSITIVE
T PALLIDUM AB SER QL: NEGATIVE

## 2021-09-03 LAB — VZV IGG SER QL IA: POSITIVE

## 2021-09-05 LAB — LEAD BLDV-MCNC: <2 UG/DL

## 2021-09-07 PROBLEM — O09.90 SUPERVISION OF HIGH RISK PREGNANCY, ANTEPARTUM: Status: ACTIVE | Noted: 2021-09-01

## 2021-09-07 PROBLEM — O20.9 VAGINAL BLEEDING BEFORE 22 WEEKS GESTATION: Status: ACTIVE | Noted: 2021-09-01

## 2021-09-07 PROBLEM — Z34.00 FIRST PREGNANCY, UNSPECIFIED TRIMESTER: Status: ACTIVE | Noted: 2021-09-01

## 2021-09-07 NOTE — PROGRESS NOTES
Family Medicine OB Education    I provided the following OB education to Colette Ramirez.    Discussed that Dr Behm should be the doctor that she sees for her prenatal visits and that Dr Behm will try hard to be the one to deliver her baby.  Discussed that if Dr Behm was unavailable that one of our other physicians would deliver the baby and that this could be a male or female provider.  Briefly discussed residency program and that multiple doctors would be present at time of delivery.  Sheet given and discussed fetal growth and development.  Sheet given and discussed warning signs with reasons to call clinic, L&D, or 24 hr HE  line with questions or concerns (phone numbers given).  Sheet given and discussed Tdap to be given after 27 weeks gestation and the importance of family members get immunized before delivery.  Proof of pregnancy completed and given to pt.  Gave pt preregistration form for hospital to complete.  See questionnaire and pregnancy risk assessment for further information in provider encounter.     Legal Screener completed and there were no concerns for government benefits, housing, or immigration.      Name of provider who requested the OB education: Dr Behm  Name of provider on site (faculty or community preceptor) at the time of performing the OB education: Dr Mary Green, RN, BSN

## 2021-09-08 ENCOUNTER — OFFICE VISIT (OUTPATIENT)
Dept: FAMILY MEDICINE | Facility: CLINIC | Age: 31
End: 2021-09-08
Payer: COMMERCIAL

## 2021-09-08 VITALS
HEART RATE: 87 BPM | RESPIRATION RATE: 20 BRPM | TEMPERATURE: 98.6 F | OXYGEN SATURATION: 98 % | WEIGHT: 201.8 LBS | BODY MASS INDEX: 39.41 KG/M2

## 2021-09-08 DIAGNOSIS — O20.9 VAGINAL BLEEDING BEFORE 22 WEEKS GESTATION: Primary | ICD-10-CM

## 2021-09-08 LAB — HCG SERPL-ACNC: ABNORMAL MLU/ML (ref 0–4)

## 2021-09-08 PROCEDURE — 36415 COLL VENOUS BLD VENIPUNCTURE: CPT | Performed by: STUDENT IN AN ORGANIZED HEALTH CARE EDUCATION/TRAINING PROGRAM

## 2021-09-08 PROCEDURE — 99213 OFFICE O/P EST LOW 20 MIN: CPT | Mod: GC | Performed by: STUDENT IN AN ORGANIZED HEALTH CARE EDUCATION/TRAINING PROGRAM

## 2021-09-08 PROCEDURE — 84702 CHORIONIC GONADOTROPIN TEST: CPT | Performed by: STUDENT IN AN ORGANIZED HEALTH CARE EDUCATION/TRAINING PROGRAM

## 2021-09-08 NOTE — PROGRESS NOTES
Preceptor Attestation:    I discussed the patient with the resident and evaluated the patient in person. I have verified the content of the note, which accurately reflects my assessment of the patient and the plan of care.   Supervising Physician:  Julia Chung MD

## 2021-09-08 NOTE — PROGRESS NOTES
Nursing Notes:   Osmany RamirezSHIRIN  9/8/2021  5:05 PM  Signed  Due to patient being non-English speaking/uses sign language, an  was used for this visit. Only for face-to-face interpretation by an external agency, date and length of interpretation can be found on the scanned worksheet.       name: Kayla Gutierrez  Language: Hmong  Agency:  [employer]  Telephone number: [phone]  Type of interpretation:  Telephone, spoken      Assessment & Plan      1. Vaginal bleeding before 22 weeks gestation  Patient presenting with 2 episodes of bleeding noted on TP after urinating, morning and second morning after patient had intercourse. Reassuring that her cramping at that time had already improved and is now resolved and she has not bled further during the past 2 days. Does have nausea which is also reassuring. She has no history of hemorrhoids, no external hemorrhoids were noted on pelvic exam 1 week ago. Patient has had bleeding earlier and seen in ED for this, HCG quant was checked last week and increased appropriately. HCG quant was drawn again today, results pending. Bedside ultrasound done, intrauterine pregnancy seen but unable to see cardiac activity secondary to poor ultrasound resolution. Patient does not yet have dating ultrasound scheduled, messaged was sent to our /referrals coordinator to help facilitate this getting scheduled sooner rather than later. Bleeding likely due to intercourse but cannot exclude threatened miscarriage. Will call patient with Hcg quant tomorrow and advise further pending results.   - Beta-HCG Quantitative    30 minutes spent on the date of the encounter doing chart review, history and exam, documentation and further activities per the note    Benita Behm, MD PGY2  [facility]  09/08/21    I precepted today with Dr. Chung.    Subjective   Colette Ramirez is a 31 year old who presents for the following health issues: bleeding in first  trimester    HPI    Patient is 9w3d today based off of LMP. She is presenting for bleeding. She says when she urinates, she saw blood on the TP on morning of 9/5 and 9/6. She has had cramps those days, too, that was less painful compared to pain she has had, pain is improved today. She did have intercourse 9/3 and 9/4 night. No fevers, chills, does have some nausea.     She has dating ultrasound scheduled    Review of Systems  Constitutional, HEENT, cardiovascular, pulmonary, gi and gu systems are negative, except as otherwise noted.    Objective   Pulse 87   Temp 98.6  F (37  C) (Oral)   Resp 20   Wt 91.5 kg (201 lb 12.8 oz)   LMP 07/04/2021 (Exact Date)   SpO2 98%   BMI 39.41 kg/m    Physical Exam    Constitutional: Awake, alert, cooperative, no apparent distress, and appears stated age.  Eyes: Lids and lashes normal, pupils equal, round and reactive to light, extra ocular muscles intact, sclera clear, conjunctiva normal.  ENT: Normocephalic, without obvious abnormality, atraumatic, external ears without lesions.  Neck: Supple, symmetrical, trachea midline, skin normal.  Lungs: No increased work of breathing, no audible wheezing  Abdomen: soft, non-distended, non-tender  Musculoskeletal: Full range of motion noted. Tone is normal.  Neurologic: Awake, alert, oriented to name, place and time.  Cranial nerves II-XII are grossly intact. Gait is normal.  Neuropsychiatric: Normal affect, mood, orientation, memory and insight.  Skin: No visible rashes, erythema, pallor, petechia or purpura.    ----- Service Performed and Documented by Resident or Fellow ------

## 2021-09-08 NOTE — NURSING NOTE
Due to patient being non-English speaking/uses sign language, an  was used for this visit. Only for face-to-face interpretation by an external agency, date and length of interpretation can be found on the scanned worksheet.       name: Kayla Brenda  Language: Margarita  Agency:  [employer]  Telephone number: [phone]  Type of interpretation:  Telephone, spoken

## 2021-09-10 ENCOUNTER — HOSPITAL ENCOUNTER (OUTPATIENT)
Dept: ULTRASOUND IMAGING | Facility: CLINIC | Age: 31
Discharge: HOME | End: 2021-09-10
Attending: FAMILY MEDICINE | Admitting: FAMILY MEDICINE
Payer: COMMERCIAL

## 2021-09-10 DIAGNOSIS — O09.91 HIGH-RISK PREGNANCY IN FIRST TRIMESTER: ICD-10-CM

## 2021-09-10 PROCEDURE — 76801 OB US < 14 WKS SINGLE FETUS: CPT

## 2021-09-13 ENCOUNTER — OFFICE VISIT (OUTPATIENT)
Dept: FAMILY MEDICINE | Facility: CLINIC | Age: 31
End: 2021-09-13
Payer: COMMERCIAL

## 2021-09-13 DIAGNOSIS — O20.9 VAGINAL BLEEDING BEFORE 22 WEEKS GESTATION: Primary | ICD-10-CM

## 2021-09-13 LAB — HCG SERPL-ACNC: <2 MLU/ML (ref 0–4)

## 2021-09-13 PROCEDURE — 99213 OFFICE O/P EST LOW 20 MIN: CPT | Mod: GC | Performed by: STUDENT IN AN ORGANIZED HEALTH CARE EDUCATION/TRAINING PROGRAM

## 2021-09-13 PROCEDURE — 84702 CHORIONIC GONADOTROPIN TEST: CPT | Performed by: STUDENT IN AN ORGANIZED HEALTH CARE EDUCATION/TRAINING PROGRAM

## 2021-09-13 PROCEDURE — 36415 COLL VENOUS BLD VENIPUNCTURE: CPT | Performed by: STUDENT IN AN ORGANIZED HEALTH CARE EDUCATION/TRAINING PROGRAM

## 2021-09-13 ASSESSMENT — MIFFLIN-ST. JEOR: SCORE: 1561.07

## 2021-09-13 NOTE — PATIENT INSTRUCTIONS
Thank you for coming into the clinic today!  Here is what we discussed:    Expect to be contacted in a few days regarding [facility] results and treatment plan     If you have any questions, please call the clinic at [phone].

## 2021-09-13 NOTE — PROGRESS NOTES
"Preceptor attestation:  Vital signs reviewed: /77 (BP Location: Left arm, Patient Position: Sitting, Cuff Size: Adult Large)   Pulse 96   Temp 98.1  F (36.7  C) (Oral)   Resp 16   Ht 1.53 m (5' 0.24\")   Wt 91.1 kg (200 lb 12.8 oz)   LMP 07/04/2021 (Exact Date)   SpO2 96%   BMI 38.91 kg/m      Patient seen, evaluated, and discussed with the resident.  I have verified the content of the note, which accurately reflects my assessment of the patient and the plan of care.    Supervising physician: Justyna Hernandez MD  [facility]  "

## 2021-09-14 VITALS
RESPIRATION RATE: 16 BRPM | DIASTOLIC BLOOD PRESSURE: 77 MMHG | SYSTOLIC BLOOD PRESSURE: 112 MMHG | HEIGHT: 60 IN | HEART RATE: 96 BPM | WEIGHT: 200.8 LBS | TEMPERATURE: 98.1 F | OXYGEN SATURATION: 96 % | BODY MASS INDEX: 39.42 KG/M2

## 2021-09-14 PROBLEM — O20.9 VAGINAL BLEEDING BEFORE 22 WEEKS GESTATION: Status: RESOLVED | Noted: 2021-09-01 | Resolved: 2021-09-14

## 2021-09-14 PROBLEM — Z34.00 FIRST PREGNANCY, UNSPECIFIED TRIMESTER: Status: RESOLVED | Noted: 2021-09-01 | Resolved: 2021-09-14

## 2021-09-14 PROBLEM — O09.90 SUPERVISION OF HIGH RISK PREGNANCY, ANTEPARTUM: Status: RESOLVED | Noted: 2021-09-01 | Resolved: 2021-09-14

## 2021-09-17 NOTE — PROGRESS NOTES
"Assessment & Plan     Vaginal bleeding before 22 weeks gestation  HCG not doubling in previous readings as it should for a viable pregnancy, recent US dating at 6wk GA but no cardiac activity; findings concerning for miscarriage. Discussed with patient and father of baby, who elected for repeat hCG today for confirmation and repeat US pending hCG results. Briefly discussed expectant vs medication vs surgical options with patient and significant other, appears they are leaning towards medication management but will wait until confirmation of miscarriage before deciding.  Addendum: hCG <2 confirming miscarriage. Pt elects to wait until f/u visit with Dr. Behm next week to discuss management options. States she may have noticed some spotting or passing of clots few days ago but paid no attention to it at that time, unsure if she passed products of conception yet definitely.   - hCG Quantitative Pregnancy  - hCG Quantitative Pregnancy      Review of the result(s) of each unique test - [facility]  Ordering of each unique test     BMI:   Estimated body mass index is 38.91 kg/m  as calculated from the following:    Height as of this encounter: 1.53 m (5' 0.24\").    Weight as of this encounter: 91.1 kg (200 lb 12.8 oz).     See Patient Instructions    Return in about 1 week (around 9/20/2021) for with Dr. Behm in 1 wk to discuss OB.    Options for treatment and follow-up care were reviewed with the patient who was engaged and actively involved in the decision making process, verbalized understanding of the options discussed, and satisfied with the final plan.    Patient was staffed with supervising physician, Dr. Justyna Hernandez,    Joshua Ramirez, DO, PGY-2  [facility]    Subjective   Michelle Ramirez is a 31 year old year old female who presents for the following health issues  accompanied by her spouse:  History reviewed. No pertinent past medical history.  Chief Complaint   Patient presents with     other     SKYLER and US " "result.    Following up after a visit last week when she presented after vaginal bleeding after sexual intercourse in setting of recent positive pregnancy test. Bedside US done that did not show cardiac activity 2/2 poor ultrasound resolution, scheduled for formal 1st trimester US that showed no cardiac activity and misshapen gestation sac suspicious for potential pregnancy failure but not diagnostic.     Has not had further episodes of vaginal bleeding, no abdominal cramping, denies n/v/d     Review of Systems:  Constitutional, HEENT, cardiovascular, pulmonary, GI, , musculoskeletal, neuro, skin, endocrine and psych systems are negative, except as otherwise noted.      Objective    /77 (BP Location: Left arm, Patient Position: Sitting, Cuff Size: Adult Large)   Pulse 96   Temp 98.1  F (36.7  C) (Oral)   Resp 16   Ht 1.53 m (5' 0.24\")   Wt 91.1 kg (200 lb 12.8 oz)   LMP 07/04/2021 (Exact Date)   SpO2 96%   Breastfeeding Unknown   BMI 38.91 kg/m    Body mass index is 38.91 kg/m .      Physical Exam   GENERAL: healthy, alert and no distress  EYES: Eyes grossly normal to inspection, PERRL and conjunctivae and sclerae normal  RESP: lungs clear to auscultation - no rales, rhonchi or wheezes  CV: regular rate and rhythm, normal S1 S2, no S3 or S4, no murmur, click or rub, no peripheral edema and peripheral pulses strong  ABDOMEN: soft, nontender, no hepatosplenomegaly, no masses and bowel sounds normal  MS: no gross musculoskeletal defects noted, no edema  SKIN: no suspicious lesions or rashes    Office Visit on 09/08/2021   Component Date Value Ref Range Status     hCG Quantitative 09/08/2021 11,595* 0 - 4 mlU/mL Final    Non-pregnant female . . . . . . . . . . . . . 0-4 (<5) mIU/mL  Equivocal result for early pregnancy . . . . 5-24 mIU/mL  Values in pregnancy should double every 2-3 days for the first 6 weeks. Patients with very low levels of hCG should be resampled and retested after 48 hours.   For " diagnostic purposes, hCG results should be used   conjunction with other data.   If the hCG level is inconsistent with clinical evidence,   results should be confirmed by an alternate hCG method.   This assay is approved for use in the early detection   of pregnancy only. It is not approved for any other   uses such as tumor marker screening or monitoring.       ----- Service Performed and Documented by Resident or Fellow ------

## 2021-09-21 ENCOUNTER — HOSPITAL ENCOUNTER (OUTPATIENT)
Dept: ULTRASOUND IMAGING | Facility: CLINIC | Age: 31
Discharge: HOME | End: 2021-09-21
Attending: FAMILY MEDICINE | Admitting: FAMILY MEDICINE
Payer: COMMERCIAL

## 2021-09-21 ENCOUNTER — OFFICE VISIT (OUTPATIENT)
Dept: FAMILY MEDICINE | Facility: CLINIC | Age: 31
End: 2021-09-21
Payer: COMMERCIAL

## 2021-09-21 VITALS
DIASTOLIC BLOOD PRESSURE: 77 MMHG | BODY MASS INDEX: 39.62 KG/M2 | HEIGHT: 60 IN | WEIGHT: 201.8 LBS | HEART RATE: 83 BPM | OXYGEN SATURATION: 99 % | TEMPERATURE: 98.7 F | SYSTOLIC BLOOD PRESSURE: 117 MMHG | RESPIRATION RATE: 20 BRPM

## 2021-09-21 DIAGNOSIS — O03.9 SPONTANEOUS PREGNANCY LOSS: Primary | ICD-10-CM

## 2021-09-21 DIAGNOSIS — O03.9 SPONTANEOUS PREGNANCY LOSS: ICD-10-CM

## 2021-09-21 PROCEDURE — 76801 OB US < 14 WKS SINGLE FETUS: CPT

## 2021-09-21 PROCEDURE — 99213 OFFICE O/P EST LOW 20 MIN: CPT | Mod: GC | Performed by: STUDENT IN AN ORGANIZED HEALTH CARE EDUCATION/TRAINING PROGRAM

## 2021-09-21 ASSESSMENT — MIFFLIN-ST. JEOR: SCORE: 1559.37

## 2021-09-21 NOTE — PATIENT INSTRUCTIONS
OB Ultrasound    [facility]  [address] [place] [address]    APPT: TODAY, 9/21/21 at 6:10 PM for OB transvaginal ultrasound.    09/23/21   OB/GYN REFERRAL  [facility]  Phone: [phone]  Fax: [phone]    Referral, demographics and office note faxed to [phone]. They will contact pt to schedule.    Anjelica Mckenzie

## 2021-09-21 NOTE — PROGRESS NOTES
Preceptor Attestation:    I discussed the patient with the resident and evaluated the patient in person. I have verified the content of the note, which accurately reflects my assessment of the patient and the plan of care.   Supervising Physician:  Bharat Miramontes MD.

## 2021-09-21 NOTE — NURSING NOTE
Due to patient being non-English speaking/uses sign language, an  was used for this visit. Only for face-to-face interpretation by an external agency, date and length of interpretation can be found on the scanned worksheet.     name: Matilde Garcia  Agency: [employer]  Language: Margarita   Telephone number: [phone]  Type of interpretation: Telephone, spoken

## 2021-09-21 NOTE — LETTER
[facility] [address] [place] [address]  Phone: [phone]  Fax: [phone]    September 21, 2021        Michelle Ramirez  [address] [place] [address]          To whom it may concern:    RE: Michelle Ramirez    Patient was seen and treated today at our clinic and missed work.  Patient may return to work 9/22/21 with the following:  No working or lifting restrictions    Please contact me for questions or concerns.      Sincerely,        Benita Kay Behm, MD

## 2021-09-21 NOTE — PROGRESS NOTES
Nursing Notes:   Osmany RamirezSHIRIN  9/21/2021 11:10 AM  Signed  Due to patient being non-English speaking/uses sign language, an  was used for this visit. Only for face-to-face interpretation by an external agency, date and length of interpretation can be found on the scanned worksheet.     name: Matilde Garcia  Agency: [employer]  Language: desmond   Telephone number: [phone]  Type of interpretation: Telephone, spoken        Assessment & Plan      1. Spontaneous pregnancy loss  Patient presents to discuss management of spontaneous early pregnancy loss. We discussed expectant management, use of medication, and D&C in depth. Last HcG quant <2 on 9/13/21 and patient has not had any further bleeding. Will repeat transvaginal ultrasound to evaluate if there is any intrauterine products of conception remaining. If there is still intrauterine contents, patient would prefer to proceed with D&C as expectant management may take too long and she is eager to conceive again, medication management is only ~60-70% effective with only 1 medication available in our clinic (combination mifepristone and misoprostol not available) and she may require D&C regardless of trying other options. Will try to schedule US asap. Will send referral to OB/GYN for D&C if products of conception seen on imaging.   - US OB TRANSVAGINAL       Review of prior external note(s) from - pevious clinic visits, labs, and imaging results  Ordering of each unique test  50 minutes spent on the date of the encounter doing chart review, history and exam, documentation and further activities per the note    No follow-ups on file.    Benita Behm, MD PGY2  [facility]  09/21/21    I precepted today with Dr. Miramontes.    Subjective   Michelle Ramirez is a 31 year old who presents for the following health issues: management of early pregnancy loss    HPI    Patient has had early pregnancy bleeding and ultrasounds that have dated 2+  "weeks behind known LMP. Dating ultrasound completed 9/10/21 showed pregnancy was 6 weeks 2 days along, a misshappen intrauterine gestation sac, and no cardiac activity. Patient has had HcG quant levels followed and found they did not rise normally. Last HcG quant measured 9/13/21 was <2 indicating spontaneous early pregnancy loss.  Patient presents today to discuss management of early pregnancy loss. Patient reports she had a couple cramps yesterday that went away, no further cramping/pain, bleeding, fevers, or chills, back pain, vaginal discharge. Still has breast tenderness.     Review of Systems  Constitutional, HEENT, cardiovascular, pulmonary, GI, , musculoskeletal, neuro, skin, endocrine and psych systems are negative, except as otherwise noted.    Objective   /77   Pulse 83   Temp 98.7  F (37.1  C) (Oral)   Resp 20   Ht 1.52 m (4' 11.84\")   Wt 91.5 kg (201 lb 12.8 oz)   LMP 07/04/2021 (Exact Date)   SpO2 99%   BMI 39.62 kg/m    Physical Exam    Constitutional: Awake, alert, cooperative, no apparent distress, and appears stated age.  Eyes: Lids and lashes normal, pupils equal, round and reactive to light, extra ocular muscles intact, sclera clear, conjunctiva normal.  ENT: Normocephalic, without obvious abnormality, atraumatic, external ears without lesions.  Neck: Supple, symmetrical, trachea midline, skin normal.  Lungs: No increased work of breathing, good air exchange, no audible wheezing.   Musculoskeletal: Full range of motion noted. Tone is normal.  Neurologic: Awake, alert, oriented to name, place and time.  Cranial nerves II-XII are grossly intact. Gait is normal.  Neuropsychiatric: Normal affect, mood, orientation, memory and insight.  Skin: No visible rashes, erythema, pallor, petechia or purpura.    ----- Service Performed and Documented by Resident or Fellow ------      "

## 2021-09-29 ENCOUNTER — APPOINTMENT (OUTPATIENT)
Dept: ULTRASOUND IMAGING | Facility: CLINIC | Age: 31
End: 2021-09-29
Payer: COMMERCIAL

## 2021-09-29 ENCOUNTER — HOSPITAL ENCOUNTER (OUTPATIENT)
Facility: CLINIC | Age: 31
Discharge: HOME | End: 2021-09-29
Attending: EMERGENCY MEDICINE | Admitting: OBSTETRICS & GYNECOLOGY
Payer: COMMERCIAL

## 2021-09-29 ENCOUNTER — ANESTHESIA EVENT (OUTPATIENT)
Dept: SURGERY | Facility: CLINIC | Age: 31
End: 2021-09-29
Payer: COMMERCIAL

## 2021-09-29 ENCOUNTER — ANESTHESIA (OUTPATIENT)
Dept: SURGERY | Facility: CLINIC | Age: 31
End: 2021-09-29
Payer: COMMERCIAL

## 2021-09-29 VITALS
HEART RATE: 92 BPM | BODY MASS INDEX: 40.25 KG/M2 | RESPIRATION RATE: 16 BRPM | HEIGHT: 60 IN | OXYGEN SATURATION: 99 % | SYSTOLIC BLOOD PRESSURE: 120 MMHG | WEIGHT: 205 LBS | DIASTOLIC BLOOD PRESSURE: 60 MMHG | TEMPERATURE: 97.7 F

## 2021-09-29 DIAGNOSIS — O03.4 INCOMPLETE MISCARRIAGE: ICD-10-CM

## 2021-09-29 LAB
ABO/RH(D): NORMAL
ANTIBODY SCREEN: NEGATIVE
BASOPHILS # BLD AUTO: 0.1 10E3/UL (ref 0–0.2)
BASOPHILS NFR BLD AUTO: 1 %
EOSINOPHIL # BLD AUTO: 0 10E3/UL (ref 0–0.7)
EOSINOPHIL NFR BLD AUTO: 0 %
ERYTHROCYTE [DISTWIDTH] IN BLOOD BY AUTOMATED COUNT: 12.4 % (ref 10–15)
HCG SERPL-ACNC: 4505 MLU/ML (ref 0–4)
HCT VFR BLD AUTO: 40.5 % (ref 35–47)
HGB BLD-MCNC: 13.5 G/DL (ref 11.7–15.7)
IMM GRANULOCYTES # BLD: 0.1 10E3/UL
IMM GRANULOCYTES NFR BLD: 1 %
LYMPHOCYTES # BLD AUTO: 1.6 10E3/UL (ref 0.8–5.3)
LYMPHOCYTES NFR BLD AUTO: 13 %
MCH RBC QN AUTO: 29.2 PG (ref 26.5–33)
MCHC RBC AUTO-ENTMCNC: 33.3 G/DL (ref 31.5–36.5)
MCV RBC AUTO: 88 FL (ref 78–100)
MONOCYTES # BLD AUTO: 0.5 10E3/UL (ref 0–1.3)
MONOCYTES NFR BLD AUTO: 4 %
NEUTROPHILS # BLD AUTO: 9.9 10E3/UL (ref 1.6–8.3)
NEUTROPHILS NFR BLD AUTO: 81 %
NRBC # BLD AUTO: 0 10E3/UL
NRBC BLD AUTO-RTO: 0 /100
PLATELET # BLD AUTO: 224 10E3/UL (ref 150–450)
RBC # BLD AUTO: 4.62 10E6/UL (ref 3.8–5.2)
SARS-COV-2 RNA RESP QL NAA+PROBE: NEGATIVE
SPECIMEN EXPIRATION DATE: NORMAL
WBC # BLD AUTO: 12.2 10E3/UL (ref 4–11)

## 2021-09-29 PROCEDURE — 250N000011 HC RX IP 250 OP 636

## 2021-09-29 PROCEDURE — 88305 TISSUE EXAM BY PATHOLOGIST: CPT | Mod: TC | Performed by: OBSTETRICS & GYNECOLOGY

## 2021-09-29 PROCEDURE — 96361 HYDRATE IV INFUSION ADD-ON: CPT

## 2021-09-29 PROCEDURE — 250N000009 HC RX 250: Performed by: NURSE ANESTHETIST, CERTIFIED REGISTERED

## 2021-09-29 PROCEDURE — 999N000141 HC STATISTIC PRE-PROCEDURE NURSING ASSESSMENT: Performed by: OBSTETRICS & GYNECOLOGY

## 2021-09-29 PROCEDURE — 84702 CHORIONIC GONADOTROPIN TEST: CPT

## 2021-09-29 PROCEDURE — 99285 EMERGENCY DEPT VISIT HI MDM: CPT | Mod: 25

## 2021-09-29 PROCEDURE — 87635 SARS-COV-2 COVID-19 AMP PRB: CPT | Performed by: EMERGENCY MEDICINE

## 2021-09-29 PROCEDURE — 250N000011 HC RX IP 250 OP 636: Performed by: NURSE ANESTHETIST, CERTIFIED REGISTERED

## 2021-09-29 PROCEDURE — 76801 OB US < 14 WKS SINGLE FETUS: CPT

## 2021-09-29 PROCEDURE — 96376 TX/PRO/DX INJ SAME DRUG ADON: CPT

## 2021-09-29 PROCEDURE — 360N000075 HC SURGERY LEVEL 2, PER MIN: Performed by: OBSTETRICS & GYNECOLOGY

## 2021-09-29 PROCEDURE — 258N000003 HC RX IP 258 OP 636

## 2021-09-29 PROCEDURE — 370N000017 HC ANESTHESIA TECHNICAL FEE, PER MIN: Performed by: OBSTETRICS & GYNECOLOGY

## 2021-09-29 PROCEDURE — 250N000013 HC RX MED GY IP 250 OP 250 PS 637: Performed by: OBSTETRICS & GYNECOLOGY

## 2021-09-29 PROCEDURE — 85025 COMPLETE CBC W/AUTO DIFF WBC: CPT

## 2021-09-29 PROCEDURE — 272N000001 HC OR GENERAL SUPPLY STERILE: Performed by: OBSTETRICS & GYNECOLOGY

## 2021-09-29 PROCEDURE — 250N000009 HC RX 250: Performed by: OBSTETRICS & GYNECOLOGY

## 2021-09-29 PROCEDURE — 36415 COLL VENOUS BLD VENIPUNCTURE: CPT

## 2021-09-29 PROCEDURE — 86900 BLOOD TYPING SEROLOGIC ABO: CPT

## 2021-09-29 PROCEDURE — 96374 THER/PROPH/DIAG INJ IV PUSH: CPT | Mod: 59

## 2021-09-29 PROCEDURE — 710N000012 HC RECOVERY PHASE 2, PER MINUTE: Performed by: OBSTETRICS & GYNECOLOGY

## 2021-09-29 RX ORDER — LIDOCAINE HYDROCHLORIDE 10 MG/ML
INJECTION, SOLUTION INFILTRATION; PERINEURAL PRN
Status: DISCONTINUED | OUTPATIENT
Start: 2021-09-29 | End: 2021-09-30 | Stop reason: HOSPADM

## 2021-09-29 RX ORDER — ONDANSETRON 2 MG/ML
INJECTION INTRAMUSCULAR; INTRAVENOUS PRN
Status: DISCONTINUED | OUTPATIENT
Start: 2021-09-29 | End: 2021-09-29

## 2021-09-29 RX ORDER — FENTANYL CITRATE 50 UG/ML
25 INJECTION, SOLUTION INTRAMUSCULAR; INTRAVENOUS
Status: DISCONTINUED | OUTPATIENT
Start: 2021-09-29 | End: 2021-09-30 | Stop reason: HOSPADM

## 2021-09-29 RX ORDER — DEXAMETHASONE SODIUM PHOSPHATE 10 MG/ML
INJECTION, SOLUTION INTRAMUSCULAR; INTRAVENOUS PRN
Status: DISCONTINUED | OUTPATIENT
Start: 2021-09-29 | End: 2021-09-29

## 2021-09-29 RX ORDER — PROPOFOL 10 MG/ML
INJECTION, EMULSION INTRAVENOUS CONTINUOUS PRN
Status: DISCONTINUED | OUTPATIENT
Start: 2021-09-29 | End: 2021-09-29

## 2021-09-29 RX ORDER — HYDROMORPHONE HCL IN WATER/PF 6 MG/30 ML
0.2 PATIENT CONTROLLED ANALGESIA SYRINGE INTRAVENOUS EVERY 5 MIN PRN
Status: DISCONTINUED | OUTPATIENT
Start: 2021-09-29 | End: 2021-09-30 | Stop reason: HOSPADM

## 2021-09-29 RX ORDER — ONDANSETRON 4 MG/1
4 TABLET, ORALLY DISINTEGRATING ORAL EVERY 30 MIN PRN
Status: DISCONTINUED | OUTPATIENT
Start: 2021-09-29 | End: 2021-09-30 | Stop reason: HOSPADM

## 2021-09-29 RX ORDER — LIDOCAINE HYDROCHLORIDE 10 MG/ML
INJECTION, SOLUTION INFILTRATION; PERINEURAL PRN
Status: DISCONTINUED | OUTPATIENT
Start: 2021-09-29 | End: 2021-09-29

## 2021-09-29 RX ORDER — SODIUM CHLORIDE, SODIUM LACTATE, POTASSIUM CHLORIDE, CALCIUM CHLORIDE 600; 310; 30; 20 MG/100ML; MG/100ML; MG/100ML; MG/100ML
INJECTION, SOLUTION INTRAVENOUS CONTINUOUS
Status: DISCONTINUED | OUTPATIENT
Start: 2021-09-29 | End: 2021-09-30 | Stop reason: HOSPADM

## 2021-09-29 RX ORDER — FENTANYL CITRATE 50 UG/ML
25 INJECTION, SOLUTION INTRAMUSCULAR; INTRAVENOUS EVERY 5 MIN PRN
Status: DISCONTINUED | OUTPATIENT
Start: 2021-09-29 | End: 2021-09-30 | Stop reason: HOSPADM

## 2021-09-29 RX ORDER — OXYCODONE HYDROCHLORIDE 5 MG/1
5 TABLET ORAL EVERY 4 HOURS PRN
Status: DISCONTINUED | OUTPATIENT
Start: 2021-09-29 | End: 2021-09-30 | Stop reason: HOSPADM

## 2021-09-29 RX ORDER — DOXYCYCLINE HYCLATE 50 MG/1
100 CAPSULE ORAL ONCE
Status: COMPLETED | OUTPATIENT
Start: 2021-09-29 | End: 2021-09-29

## 2021-09-29 RX ORDER — KETOROLAC TROMETHAMINE 30 MG/ML
INJECTION, SOLUTION INTRAMUSCULAR; INTRAVENOUS PRN
Status: DISCONTINUED | OUTPATIENT
Start: 2021-09-29 | End: 2021-09-29

## 2021-09-29 RX ORDER — FENTANYL CITRATE 50 UG/ML
INJECTION, SOLUTION INTRAMUSCULAR; INTRAVENOUS PRN
Status: DISCONTINUED | OUTPATIENT
Start: 2021-09-29 | End: 2021-09-29

## 2021-09-29 RX ORDER — ACETAMINOPHEN 325 MG/1
975 TABLET ORAL ONCE
Status: COMPLETED | OUTPATIENT
Start: 2021-09-29 | End: 2021-09-29

## 2021-09-29 RX ORDER — LIDOCAINE 40 MG/G
CREAM TOPICAL
Status: DISCONTINUED | OUTPATIENT
Start: 2021-09-29 | End: 2021-09-30 | Stop reason: HOSPADM

## 2021-09-29 RX ORDER — ONDANSETRON 2 MG/ML
4 INJECTION INTRAMUSCULAR; INTRAVENOUS EVERY 30 MIN PRN
Status: DISCONTINUED | OUTPATIENT
Start: 2021-09-29 | End: 2021-09-30 | Stop reason: HOSPADM

## 2021-09-29 RX ORDER — IBUPROFEN 200 MG
600 TABLET ORAL EVERY 4 HOURS PRN
Qty: 30 TABLET | Refills: 0 | Status: ON HOLD | OUTPATIENT
Start: 2021-09-29 | End: 2023-02-28

## 2021-09-29 RX ORDER — MORPHINE SULFATE 4 MG/ML
4 INJECTION, SOLUTION INTRAMUSCULAR; INTRAVENOUS
Status: COMPLETED | OUTPATIENT
Start: 2021-09-29 | End: 2021-09-29

## 2021-09-29 RX ORDER — MAGNESIUM HYDROXIDE 1200 MG/15ML
LIQUID ORAL PRN
Status: DISCONTINUED | OUTPATIENT
Start: 2021-09-29 | End: 2021-09-30 | Stop reason: HOSPADM

## 2021-09-29 RX ORDER — SODIUM CHLORIDE 9 MG/ML
INJECTION, SOLUTION INTRAVENOUS CONTINUOUS
Status: DISCONTINUED | OUTPATIENT
Start: 2021-09-29 | End: 2021-09-30 | Stop reason: HOSPADM

## 2021-09-29 RX ORDER — PROPOFOL 10 MG/ML
INJECTION, EMULSION INTRAVENOUS PRN
Status: DISCONTINUED | OUTPATIENT
Start: 2021-09-29 | End: 2021-09-29

## 2021-09-29 RX ORDER — MEPERIDINE HYDROCHLORIDE 25 MG/ML
12.5 INJECTION INTRAMUSCULAR; INTRAVENOUS; SUBCUTANEOUS
Status: DISCONTINUED | OUTPATIENT
Start: 2021-09-29 | End: 2021-09-30 | Stop reason: HOSPADM

## 2021-09-29 RX ADMIN — KETOROLAC TROMETHAMINE 30 MG: 30 INJECTION, SOLUTION INTRAMUSCULAR at 14:29

## 2021-09-29 RX ADMIN — DEXAMETHASONE SODIUM PHOSPHATE 10 MG: 10 INJECTION, SOLUTION INTRAMUSCULAR; INTRAVENOUS at 14:28

## 2021-09-29 RX ADMIN — MORPHINE SULFATE 4 MG: 4 INJECTION, SOLUTION INTRAMUSCULAR; INTRAVENOUS at 10:43

## 2021-09-29 RX ADMIN — FENTANYL CITRATE 50 MCG: 50 INJECTION, SOLUTION INTRAMUSCULAR; INTRAVENOUS at 14:25

## 2021-09-29 RX ADMIN — DOXYCYCLINE HYCLATE 100 MG: 50 CAPSULE ORAL at 13:50

## 2021-09-29 RX ADMIN — MORPHINE SULFATE 4 MG: 4 INJECTION, SOLUTION INTRAMUSCULAR; INTRAVENOUS at 06:55

## 2021-09-29 RX ADMIN — MIDAZOLAM HYDROCHLORIDE 2 MG: 1 INJECTION, SOLUTION INTRAMUSCULAR; INTRAVENOUS at 14:24

## 2021-09-29 RX ADMIN — SODIUM CHLORIDE: 9 INJECTION, SOLUTION INTRAVENOUS at 10:15

## 2021-09-29 RX ADMIN — FENTANYL CITRATE 50 MCG: 50 INJECTION, SOLUTION INTRAMUSCULAR; INTRAVENOUS at 14:28

## 2021-09-29 RX ADMIN — ACETAMINOPHEN 975 MG: 325 TABLET ORAL at 13:47

## 2021-09-29 RX ADMIN — PROPOFOL 70 MG: 10 INJECTION, EMULSION INTRAVENOUS at 14:25

## 2021-09-29 RX ADMIN — LIDOCAINE HYDROCHLORIDE 5 ML: 10 INJECTION, SOLUTION INFILTRATION; PERINEURAL at 14:25

## 2021-09-29 RX ADMIN — PROPOFOL 200 MCG/KG/MIN: 10 INJECTION, EMULSION INTRAVENOUS at 14:25

## 2021-09-29 RX ADMIN — ONDANSETRON 4 MG: 2 INJECTION INTRAMUSCULAR; INTRAVENOUS at 14:25

## 2021-09-29 RX ADMIN — SODIUM CHLORIDE 1000 ML: 9 INJECTION, SOLUTION INTRAVENOUS at 06:58

## 2021-09-29 ASSESSMENT — ENCOUNTER SYMPTOMS
WEAKNESS: 0
BACK PAIN: 0
LIGHT-HEADEDNESS: 1
NAUSEA: 1
DIFFICULTY URINATING: 0
SHORTNESS OF BREATH: 0
FEVER: 0
DYSURIA: 0
VOMITING: 0
FATIGUE: 0
PALPITATIONS: 0
CHILLS: 0
HEADACHES: 0
DIAPHORESIS: 1
FLANK PAIN: 0
DIZZINESS: 1
ABDOMINAL PAIN: 1
HEMATURIA: 0

## 2021-09-29 ASSESSMENT — MIFFLIN-ST. JEOR: SCORE: 1576.37

## 2021-09-29 NOTE — ED PROVIDER NOTES
Emergency Department Encounter     Evaluation Date & Time:   2021  5:57 AM    CHIEF COMPLAINT:  Pelvic Pain and Vaginal Bleeding      Triage Note:Pt to the ED with her s/o with a c/o pelvic pain and vag bleeding since about 2300 last night - states 1 pad since 2300 last night. Pt was about 6 weeks pregnant, was given pills to pass fetal demise, she took the pill at 2100 last night. Pt is alert, orient and appropriate. Her skin is warm and dry. Her s/o is by her side.         Impression and Plan     ED COURSE & MEDICAL DECISION MAKING:    Michelle Ramirez is a 31 year old female,  pmh significant for spontaneous early  at 6 weeks gestation beginning medication management on  presenting to the ED with pelvic pain and vaginal bleeding.    Differential includes but is not limited to septic . Patient's blood pressure is low at 99/51 and HR is normal at 77. Patient is afebrile, no signs of acute infection on exam.  Patient is not tachycardic though given blood pressure patient received 1L NS bolus.  Given severity of patient's pain received morphine.  CBC significant for nml hgb reassuring in context of blood loss that blood loss is not severe and it is expected for medical management of early spontaneous . WBC slightly elevated. Infection possible, though unlikely given patient continues to be afebrile, VSS and low clinical suspicion for infection.  Pelvic exam complicated by severe pain and patient movement not tolerating speculum, notable for minimal blood pooling in vaginal vault comparable to expected menses blood loss.  This is supportive of overall clinical picture of typical blood loss for early spontaneous  with medical management, transfusion or D&C not indicated medically, patient is stable. 1. Irregular intrauterine gestational sac in the upper endometrial cavity. Transvaginal US impression was that the previously seen fetal pole and yolk sac are no longer present.  Findings are consistent with a failed early pregnancy and there is a newly visualized 2.8 cm subchorionic hemorrhage.  OB Dr. Damon consulted and discussed options with patient, Covid swab ordered and patient is n.p.o. Patient moved to the OR for D&C.     6:30 AM: Patient seen using [employer] .   7AM: WBC slightly elevated with elevated neutrophil count. Possible infection, though afebrile and without tenderness or clinical signs of infection. Hgb is nml.   7AM: B positive blood type, antibody screen is negative. HCG quantitative at 4,505  7:30AM: Patient seen for pelvic exam and updated on labs.  Patient reports pain is unchanged continues to be 10 out of 10.  Pelvic exam complicated by severe pain, difficult to view the os, mild-moderate fluid in vaginal vault comparable to expected menses bleeding, no active oozing of blood.   9AM: Transvaginal US: 1. Irregular intrauterine gestational sac in the upper endometrial cavity. The previously seen fetal pole and yolk sac are no longer present. Findings are consistent with a failed early pregnancy.  2. Newly visualized 2.8 cm subchorionic hemorrhage.  9:57 AM: Patient seen and updated that OB will be consulted to discuss options given severity of pain, patient says that pain is now at an 8 out of 10.  Afebrile, VSS  10:30AM: Patient seen to update and reported that she is open to hearing more abou D&C as an option if ORs are available, would like to discuss further with OB if able.  10:40AM: Per RN OB called and will come to evaluate patient to assess and discuss options.   12:30PM: OB consulted, plan to move patient to the OR for D&C.       At the conclusion of the encounter I discussed the results of all the tests and the disposition. The questions were answered. The patient or family acknowledged understanding and was agreeable with the care plan.    FINAL IMPRESSION:    ICD-10-CM    1. Incomplete miscarriage  O03.4 Case Request: DILATION AND CURETTAGE,  UTERUS, USING SUCTION     Case Request: DILATION AND CURETTAGE, UTERUS, USING SUCTION     Dilation and curettage suction     Dilation and curettage suction         Reassessments & Consults  ED Course as of Sep 29 1348   Wed Sep 29, 2021   0731 31-year-old female presents here with vaginal bleeding and lower abdominal pain.      32 Patient has been treated with misoprostol for an incomplete AB.  She is here with increased pain.      0732 On examination she is nontoxic appears in discomfort tenderness palpation lower abdomen no guarding no rebound.      0733 Lab work revealed a quant of 4505 a hemoglobin of 13.5 slightly elevated white blood cell count of 12.2.  patient is afebrile.      0733 Differential diagnosis include but not limited to septic AB incomplete       0733 Among others.   progression of  anemia      1227 Dr. Damon at bedside.  Will take patient to the OR for D&C.  Patient and  in agreement.      1228 ABO/Rh(D): B POS   1228 RDW: 12.4       MEDICATIONS GIVEN IN THE EMERGENCY DEPARTMENT:  Medications   0.9% sodium chloride BOLUS (0 mLs Intravenous Stopped 21 1014)     Followed by   sodium chloride 0.9% infusion ( Intravenous New Bag 21 1015)   Provider ordered ALTERNATE pre op antibiotic. (has no administration in time range)   doxycycline hyclate (VIBRAMYCIN) capsule 100 mg (has no administration in time range)   lidocaine 1 % 0.1-1 mL (has no administration in time range)   lidocaine (LMX4) cream (has no administration in time range)   sodium chloride (PF) 0.9% PF flush 3 mL (has no administration in time range)   sodium chloride (PF) 0.9% PF flush 3 mL (has no administration in time range)   lactated ringers infusion (has no administration in time range)   morphine (PF) injection 4 mg (4 mg Intravenous Given 21 0655)   morphine (PF) injection 4 mg (4 mg Intravenous Given 21 1043)   acetaminophen (TYLENOL) tablet 975 mg (975 mg Oral Given 21 1347)        NEW PRESCRIPTIONS STARTED AT TODAY'S ED VISIT:  Current Discharge Medication List          HPI     HPI     Michelle Ramirez is a 31 year old female pmh significant for spontaneous early  beginning medication management presenting to the ED with pelvic pain and vaginal bleeding.    Was seen in clinic 21 for medical management of spontaneous pregnancy loss.  Was prescribed misoprostol and took medication last night, 21.  Began noticing severe cramping pain and lower pelvis and at around midnight passed a clot, unclear size of clot.  Patient did not pass any more clots though continues to notice blood when wiping. Reports going through 1 pad in total at home. Cramping pain is at a 10 out of 10.  Patient reports being lightheaded, nauseous, and diaphoretic.  Reports no fevers, vomiting, chest pain, shortness of breath.    REVIEW OF SYSTEMS:  Review of Systems   Constitutional: Positive for diaphoresis. Negative for chills, fatigue and fever.   Eyes: Negative for visual disturbance.   Respiratory: Negative for shortness of breath.    Cardiovascular: Negative for chest pain, palpitations and leg swelling.   Gastrointestinal: Positive for nausea. Negative for vomiting.   Genitourinary: Positive for pelvic pain and vaginal bleeding. Negative for difficulty urinating, dysuria, flank pain and hematuria.   Musculoskeletal: Negative for back pain.   Neurological: Positive for dizziness and light-headedness. Negative for syncope, weakness and headaches.       Medical History     No past medical history on file.    Past Surgical History:   Procedure Laterality Date     NO HISTORY OF SURGERY         Family History   Problem Relation Age of Onset     Cancer No family hx of      Diabetes No family hx of      Coronary Artery Disease No family hx of        Social History     Tobacco Use     Smoking status: Never Smoker     Smokeless tobacco: Never Used   Vaping Use     Vaping Use: Never used   Substance Use Topics      Alcohol use: Never     Drug use: Never       aspirin (ASA) 81 MG EC tablet  Prenatal Vit-Fe Fumarate-FA (PRENATAL MULTIVITAMIN W/IRON) 27-0.8 MG tablet        Physical Exam     First Vitals:  Patient Vitals for the past 24 hrs:   BP Temp Temp src Pulse Resp SpO2 Height Weight   09/29/21 1300 124/74 -- -- 92 -- 97 % -- --   09/29/21 1200 123/67 -- -- 94 -- 97 % -- --   09/29/21 1130 120/68 -- -- 94 -- 96 % -- --   09/29/21 1100 123/66 -- -- 94 16 94 % -- --   09/29/21 1030 131/68 -- -- 98 -- 96 % -- --   09/29/21 1019 137/73 -- -- 92 18 98 % -- --   09/29/21 0745 -- -- -- 87 -- 97 % -- --   09/29/21 0600 102/52 -- -- 76 -- 97 % -- --   09/29/21 0559 99/51 97.8  F (36.6  C) Oral 77 20 -- 1.524 m (5') 93 kg (205 lb)       PHYSICAL EXAM:   Physical Exam  Constitutional:       General: She is in acute distress.      Appearance: She is not toxic-appearing.   HENT:      Head: Normocephalic and atraumatic.      Nose: Nose normal.      Mouth/Throat:      Mouth: Mucous membranes are moist.      Pharynx: Oropharynx is clear.   Eyes:      General: No scleral icterus.     Conjunctiva/sclera: Conjunctivae normal.   Cardiovascular:      Rate and Rhythm: Normal rate and regular rhythm.      Pulses: Normal pulses.      Heart sounds: Normal heart sounds. No murmur heard.     Pulmonary:      Effort: Pulmonary effort is normal. No respiratory distress.      Breath sounds: Normal breath sounds. No wheezing.   Abdominal:      General: Bowel sounds are normal. There is no distension.      Palpations: Abdomen is soft.      Tenderness: There is no abdominal tenderness. There is no guarding.   Musculoskeletal:         General: No tenderness.      Right lower leg: No edema.      Left lower leg: No edema.   Skin:     General: Skin is warm and dry.      Capillary Refill: Capillary refill takes less than 2 seconds.   Neurological:      General: No focal deficit present.      Mental Status: She is alert and oriented to person, place, and  time.     Upon second evaluation:  Patient seen for pelvic exam. Pelvic exam complicated by severe pain and was not able to continue exam, difficult to view the os due to patient movement  Vulva normal without active bleeding noted externally or in underwear. Vaginal vault: Minimal pooling of blood vaginal vault comparable to expected menses bleeding, no active oozing of blood.   External os: Difficult to visualize due to patient movement     Results     LAB:  All pertinent labs reviewed and interpreted  Labs Ordered and Resulted from Time of ED Arrival Up to the Time of Departure from the ED   HCG QUANTITATIVE PREGNANCY - Abnormal; Notable for the following components:       Result Value    hCG Quantitative 4,505 (*)     All other components within normal limits   CBC WITH PLATELETS AND DIFFERENTIAL - Abnormal; Notable for the following components:    WBC Count 12.2 (*)     Absolute Neutrophils 9.9 (*)     Absolute Immature Granulocytes 0.1 (*)     All other components within normal limits   COVID-19 VIRUS (CORONAVIRUS) BY PCR   CALL   CALL   NO   TYPE AND SCREEN, ADULT   CBC WITH PLATELETS & DIFFERENTIAL    Narrative:     The following orders were created for panel order CBC with platelets differential.  Procedure                               Abnormality         Status                     ---------                               -----------         ------                     CBC with platelets and d...[[id number]]  Abnormal            Final result                 Please view results for these tests on the individual orders.   ABO/RH TYPE AND SCREEN    Narrative:     The following orders were created for panel order ABO/Rh type and screen.  Procedure                               Abnormality         Status                     ---------                               -----------         ------                     Adult Type and Screen[[id number]]                            Edited Result - FINAL        Please view  results for these tests on the individual orders.       RADIOLOGY:  US OB <14 Weeks W Transvaginal    (Results Pending)                PROCEDURES:  Procedures:    [facility] Documentation        Joanna Vasquez MD  Emergency Medicine  [facility] EMERGENCY ROOM       Joanna Vasquez MD  Resident  09/29/21 2303

## 2021-09-29 NOTE — DISCHARGE INSTRUCTIONS
Michelle received Tylenol 975 mg at 1:45pm today 9/29/21 in pre op. She may have additional tylenol every 6 hours as needed base on MD recommendations or label instructions.

## 2021-09-29 NOTE — H&P
[facility] LABOR & DELIVERY   [facility] CONSULTATION    NAME:Michelle Ramirez  : 1990   MRN: [id number]   GESTATIONAL AGE: Unknown    ADMISSION DATE: 2021     PCP:  Behm, Benita Kay     CHIEF COMPLAINT:  Incomplete     HPI: I have been requested by [facility] to evaluate Michelle Ramirez for incomplete . Patient is a 31 year old,  female around 6wks. History obtained through the patient and chart review. Patient reports that she was treated with misoprostol.  She took 1 of 4 doses and then presented to the ED with bleeding an pelvic cramping. Patient reports that these symptoms have been going on for 4 hours.     DIAGNOSIS COMPLICATING PREGNANCY  Incomplete Ab    OBSTETRICAL HISTORY:         PAST MEDICAL HISTORY:  History reviewed. No pertinent past medical history.     PAST SURGICAL HISTORY:  Past Surgical History:   Procedure Laterality Date     NO HISTORY OF SURGERY          SOCIAL HISTORY:   reports that she has never smoked. She has never used smokeless tobacco. She reports that she does not drink alcohol and does not use drugs.     MEDICATIONS:  No current facility-administered medications on file prior to encounter.  aspirin (ASA) 81 MG EC tablet, Take 1 tablet (81 mg) by mouth daily (Patient not taking: Reported on 2021)  Prenatal Vit-Fe Fumarate-FA (PRENATAL MULTIVITAMIN W/IRON) 27-0.8 MG tablet, Take 1 tablet by mouth daily         ALLERGIES:  No Known Allergies     REVIEW OF SYSTEMS   Negative except what is stated in the HPI    PHYSICAL EXAM:  /67   Pulse 94   Temp 97.8  F (36.6  C) (Oral)   Resp 16   Ht 1.524 m (5')   Wt 93 kg (205 lb)   LMP 2021   SpO2 97%   Breastfeeding No   BMI 40.04 kg/m    GEN: NAD; Alert and oriented, appropriate affect.  HEENT  negative  Heart     S1, S2 normal, no murmur, click, rub or gallop, regular rate and rhythm, chest is clear without rales or wheezing, no pedal edema, no JVD, no  hepatosplenomegaly  Lungs    Clear to auscultation, no wheezes or crackles, normal breath sounds  Abdomen   Abdomen soft, non-tender. BS normal. No masses, organomegaly  Extremities  Normal, Warm, No cyanosis, no clubbing, No edema and nontender  Vaginal exam: 1cm with tissue in the os  Membranes:     Lab Results: personally reviewed    Results for KASEY NUGENT (MRN [id number]) as of 9/29/2021 12:42   Ref. Range 9/29/2021 06:54   hCG Quantitative Latest Ref Range: 0 - 4 mlU/mL 4,505 (H)   WBC Latest Ref Range: 4.0 - 11.0 10e3/uL 12.2 (H)   Hemoglobin Latest Ref Range: 11.7 - 15.7 g/dL 13.5   Hematocrit Latest Ref Range: 35.0 - 47.0 % 40.5   Platelet Count Latest Ref Range: 150 - 450 10e3/uL 224   RBC Count Latest Ref Range: 3.80 - 5.20 10e6/uL 4.62   MCV Latest Ref Range: 78 - 100 fL 88   MCH Latest Ref Range: 26.5 - 33.0 pg 29.2   MCHC Latest Ref Range: 31.5 - 36.5 g/dL 33.3   RDW Latest Ref Range: 10.0 - 15.0 % 12.4   % Neutrophils Latest Units: % 81   % Lymphocytes Latest Units: % 13   % Monocytes Latest Units: % 4   % Eosinophils Latest Units: % 0   Absolute Basophils Latest Ref Range: 0.0 - 0.2 10e3/uL 0.1   % Basophils Latest Units: % 1   Absolute Eosinophils Latest Ref Range: 0.0 - 0.7 10e3/uL 0.0   Absolute Immature Granulocytes Latest Ref Range: <=0.0 10e3/uL 0.1 (H)   Absolute Lymphocytes Latest Ref Range: 0.8 - 5.3 10e3/uL 1.6   Absolute Monocytes Latest Ref Range: 0.0 - 1.3 10e3/uL 0.5   % Immature Granulocytes Latest Units: % 1   Absolute Neutrophils Latest Ref Range: 1.6 - 8.3 10e3/uL 9.9 (H)   Absolute NRBCs Latest Units: 10e3/uL 0.0   NRBCs per 100 WBC Latest Ref Range: <1 /100 0   ABO/Rh(D) Unknown B POS   Antibody Screen Latest Ref Range: Negative  Negative   SPECIMEN EXPIRATION DATE Unknown [id number]       IMAGING:  US OB <14 Weeks W Transvaginal  Narrative: EXAM: US OB <14 WEEKS WITH TRANSVAGINAL SINGLE  LOCATION: [facility]  DATE/TIME: 9/29/2021 8:04  AM    INDICATION: Spontaneous early   COMPARISON: Ultrasound 2021  TECHNIQUE: Transabdominal scans were performed. Endovaginal ultrasound was performed to better visualize the embryo.    FINDINGS:  UTERUS: Residual irregular intrauterine gestational sac in the upper endometrial cavity with a mean diameter 1.7 cm, previously 1.1 cm. No fetal pole or yolk sac is identified on this exam. Previously there was a fetal pole measuring 4 mm in length. Low   anterior subchorionic hemorrhage measuring 2.8 x 1.8 x 1.3 cm, new since the prior exam.    RIGHT OVARY: Physiologic cyst measuring 1.7 cm, previously 2.6 cm.  LEFT OVARY: Normal.  Impression: IMPRESSION:     1. Irregular intrauterine gestational sac in the upper endometrial cavity. The previously seen fetal pole and yolk sac are no longer present. Findings are consistent with a failed early pregnancy.  2. Newly visualized 2.8 cm subchorionic hemorrhage.     I have reviewed the radiologists interpretation     IMPRESSION:  31 year old  @ 6wks with incomplete ab      RECOMMENDATIONS:  Plan to proceed with suction D and C  Risks and benefits were discussed with patient, including but not limited to bleeding, infection, injury to other organs.  Questions answered.  Consent obtained.        Thank you for allowing us to participate in the care of this patient.  Please contact us with any questions/concerns.      Pinky Damon, DO on 2021 at 12:39 PM

## 2021-09-29 NOTE — OR NURSING
Pt c/o throat pain 2/10. She had tylenol at 1347 and toradol in the OR. She was offered oxy but stated she didn't want it, but would try warm water to soothe her throat instead.      Heriberto here at patient bedside and stated they are refusing the need for an . Michelle was in agreement with Heriberto and declined the need for one for discharge.

## 2021-09-29 NOTE — ED NOTES
OB called and will come to see pt, will plan OR for 2pm most likely. Pt to be kept NPO last ate at 7pm last night., Pt updated to remain NPO. Pt would like something more for pain, MD notified.

## 2021-09-29 NOTE — OP NOTE
PROCEDURE NOTE - SUCTION D & C    NAME: Michelle Ramirez  : 1990  MRN: [id number]     DATE OF SERVICE: 2021     PREOPERATIVE DIAGNOSIS: Missed AB at 6 weeks gestation    POSTOPERATIVE DIAGNOSIS: Same    PROCEDURE: Suction dilatation and curettage    SURGEON: Pinky Damon DO    ANESTHESIA: MAC    ESTIMATED BLOOD LOSS: 10ml    SPECIMENS: Uterine contents, sent to pathology    COMPLICATIONS: None.     HISTORY OF PRESENT ILLNESS:  This is a 31 year old female with a known missed . The risks, benefits and alternatives to the procedure were discussed with her at length.  She expressed understanding and wished to proceed.     PROCEDURE NOTE:  Patient was brought to the operating room and after induction of anesthesia was prepped and draped in the dorsal lithotomy position.  A time out was called and the patient and the procedure were verified.  Examination reveals products of conception in the os.  There cervix was dilated to 1.5cm.  The contents were removed.  Uterus was noted to be 8 weeks gestation. The bladder was drained of urine.  A sterile speculum was placed.  The anterior lip of the cervix was grasped with a single tooth tenaculum.  A # 10 suction curette was induced and rotated to clear the uterus of all products of conception.  A sharp curette was then introduced. Once it was felt that all tissue was removed from all quadrants a decision was made to terminate the procedure. Sponge and instrument counts were correct. Patient tolerated the procedure well and was taken to the recovery room in good condition.      Pinky Damon DO      CC: Behm, Benita Kay, Pinky Damon DO

## 2021-09-29 NOTE — ANESTHESIA PREPROCEDURE EVALUATION
Anesthesia Pre-Procedure Evaluation    Patient: Michelle Ramirez   MRN: [id number] : 1990        Preoperative Diagnosis: Incomplete miscarriage [O03.4]   Procedure : Procedure(s):  DILATION AND CURETTAGE, UTERUS, USING SUCTION     History reviewed. No pertinent past medical history.   Past Surgical History:   Procedure Laterality Date     NO HISTORY OF SURGERY        No Known Allergies   Social History     Tobacco Use     Smoking status: Never Smoker     Smokeless tobacco: Never Used   Substance Use Topics     Alcohol use: Never      Wt Readings from Last 1 Encounters:   21 93 kg (205 lb)        Anesthesia Evaluation   Pt has had prior anesthetic. Type: MAC.        ROS/MED HX  ENT/Pulmonary:  - neg pulmonary ROS     Neurologic:  - neg neurologic ROS     Cardiovascular:  - neg cardiovascular ROS     METS/Exercise Tolerance: >4 METS    Hematologic:  - neg hematologic  ROS     Musculoskeletal:  - neg musculoskeletal ROS     GI/Hepatic:  - neg GI/hepatic ROS     Renal/Genitourinary:  - neg Renal ROS     Endo:     (+) Obesity,     Psychiatric/Substance Use:  - neg psychiatric ROS     Infectious Disease:  - neg infectious disease ROS     Malignancy:  - neg malignancy ROS     Other:            Physical Exam    Airway        Mallampati: III   TM distance: > 3 FB   Neck ROM: full   Mouth opening: > 3 cm    Respiratory Devices and Support         Dental       (+) chipped      Cardiovascular   cardiovascular exam normal          Pulmonary   pulmonary exam normal                OUTSIDE LABS:  CBC:   Lab Results   Component Value Date    WBC 12.2 (H) 2021    WBC 9.1 2021    HGB 13.5 2021    HGB 13.3 2021    HCT 40.5 2021    HCT 39.7 2021     2021     2021     BMP:   Lab Results   Component Value Date     2021    POTASSIUM 3.4 (L) 2021    CHLORIDE 105 2021    CO2 22 2021    BUN 9 2021    CR 0.67 2021    GLC 82  08/26/2021     COAGS: No results found for: PTT, INR, FIBR  POC:   Lab Results   Component Value Date    HCG Positive (A) 08/20/2021     HEPATIC: No results found for: ALBUMIN, PROTTOTAL, ALT, AST, GGT, ALKPHOS, BILITOTAL, BILIDIRECT, NOHEMI  OTHER:   Lab Results   Component Value Date    MADIHA 9.3 08/26/2021       Anesthesia Plan    ASA Status:  3   NPO Status:  NPO Appropriate    Anesthesia Type: MAC.     - Reason for MAC: straight local not clinically adequate   Induction: N/a.   Maintenance: Balanced.        Consents    Anesthesia Plan(s) and associated risks, benefits, and realistic alternatives discussed. Questions answered and patient/representative(s) expressed understanding.     - Discussed with:  Patient      - Extended Intubation/Ventilatory Support Discussed: No.      - Patient is DNR/DNI Status: No    Use of blood products discussed: No .     Postoperative Care    Pain management: IV analgesics, Multi-modal analgesia.   PONV prophylaxis: Ondansetron (or other 5HT-3), Dexamethasone or Solumedrol     Comments:    MAC.  Patient aware this isn't GA and patient may recall things in the OR.   was .              Jorge Steele MD

## 2021-09-29 NOTE — ANESTHESIA CARE TRANSFER NOTE
Patient: Michelle Ramirez    Procedure(s):  DILATION AND CURETTAGE, UTERUS, USING SUCTION    Diagnosis: Incomplete miscarriage [O03.4]  Diagnosis Additional Information: No value filed.    Anesthesia Type:   MAC     Note:    Oropharynx: oropharynx clear of all foreign objects  Level of Consciousness: drowsy  Oxygen Supplementation: face mask  Level of Supplemental Oxygen (L/min / FiO2): 8  Independent Airway: airway patency satisfactory and stable  Dentition: dentition unchanged  Vital Signs Stable: post-procedure vital signs reviewed and stable  Report to RN Given: handoff report given  Patient transferred to: Phase II    Handoff Report: Identifed the Patient, Identified the Reponsible Provider, Reviewed the pertinent medical history, Discussed the surgical course, Reviewed Intra-OP anesthesia mangement and issues during anesthesia, Set expectations for post-procedure period and Allowed opportunity for questions and acknowledgement of understanding      Vitals:  Vitals Value Taken Time   /73 09/29/21 1451   Temp 36.2  C (97.2  F) 09/29/21 1451   Pulse 95 09/29/21 1451   Resp 16 09/29/21 1451   SpO2         Electronically Signed By: KAILA Blankenship CRNA  September 29, 2021  2:55 PM

## 2021-09-29 NOTE — ED PROVIDER NOTES
EMERGENCY DEPARTMENT ENCOUNTER      NAME: Michelle Ramirez  AGE: 31 year old female  YOB: 1990  MRN: [id number]  EVALUATION DATE & TIME: 2021  5:57 AM    PCP: Behm, Benita Kay    ED PROVIDER: Kelley Paiz M.D.      CHIEF COMPLAINT     Chief Complaint   Patient presents with     Pelvic Pain     Vaginal Bleeding         FINAL IMPRESSION:     1. Incomplete miscarriage          MEDICAL DECISION MAKING:       Pertinent Labs & Imaging studies reviewed. (See chart for details)    31 year old female presents to the Emergency Department for evaluation of conrad pain vaginal bleeding    ED Course as of Sep 29 1230   Wed Sep 29, 2021   0731 31-year-old female presents here with vaginal bleeding and lower abdominal pain.      0732 Patient has been treated with misoprostol for an incomplete AB.  She is here with increased pain.      0732 On examination she is nontoxic appears in discomfort tenderness palpation lower abdomen no guarding no rebound.      0733 Lab work revealed a quant of 4505 a hemoglobin of 13.5 slightly elevated white blood cell count of 12.2.  patient is afebrile.      0733 Differential diagnosis include but not limited to septic AB incomplete       0733 Among others.   progression of  anemia      1227 Dr. Damon at bedside.  Will take patient to the OR for D&C.  Patient and  in agreement.      1228 ABO/Rh(D): B POS   1228 RDW: 12.4         Differential Diagnosis (include but not limited to)  Complete miscarriage incomplete miscarriage safety AV appendicitis 7 others      Vital Signs: reviewed  EKG:none  Imaging: Chest down  Home Meds: reviewed  ED meds/abx: morphine  Fluids: NS    Labs    Wbc 12.2  Hgb 13.5  platelets 224  hCG 4505  Positive      Review of Previous Records  Per chart review, patient was seen in the [facility] ED on  for evaluation of very mild vaginal bleeding. Patient had been seen by OB. Physical exam was completely unremarkable. Beta was 2600. US  "showed possible dual pregnancy versus subchorionic hemorrhage. Patient had follow up scheduled with OB/GYN. She was discharged home with B6 and was already on prenatals.     Patient was seen in the [facility] on 9/21  \"1. Spontaneous pregnancy loss  Patient presents to discuss management of spontaneous early pregnancy loss. We discussed expectant management, use of medication, and D&C in depth. Last HcG quant <2 on 9/13/21 and patient has not had any further bleeding. Will repeat transvaginal ultrasound to evaluate if there is any intrauterine products of conception remaining. If there is still intrauterine contents, patient would prefer to proceed with D&C as expectant management may take too long and she is eager to conceive again, medication management is only ~60-70% effective with only 1 medication available in our clinic (combination mifepristone and misoprostol not available) and she may require D&C regardless of trying other options. Will try to schedule US asap. Will send referral to OB/GYN for D&C if products of conception seen on imaging.   - US OB TRANSVAGINAL \"    US Impression 9/21  1.  Single intrauterine gestation sac contains an embryonic pole with no cardiac activity and CRL of 0.4 cm (6 weeks 1 day) compared with CRL of 0.5 cm (6 week 2 day) at the previous 09/10/2021 exam which is diagnostic of pregnancy failure.  2.  Relatively homogeneous endometrial thickening up to 2.5 cm appears unchanged.  3.  Corpus luteum with internal cystic change in the right ovary is stable.     Consults  OB - Dr. Damon     ED COURSE     6:43 AM I discussed the case with the resident Dr. Vasquez.   7:03 AM I met the patient and performed my initial interview and exam. I saw the patient in PPE including a face shield, N95 mask, and gloves.  7:32 AM I rechecked the patient.   10:15 AM I spoke with Dr. Damon, OB, regarding patient plan of care.   12:15 PM patient will be taken to the OR.    At the conclusion of the " encounter I discussed the results of all of the tests and the disposition. The questions were answered. The patient acknowledged understanding and was agreeable with the care plan.         MEDICATIONS GIVEN IN THE EMERGENCY:     Medications   0.9% sodium chloride BOLUS (0 mLs Intravenous Stopped 21 1014)     Followed by   sodium chloride 0.9% infusion ( Intravenous New Bag 21 1015)   morphine (PF) injection 4 mg (4 mg Intravenous Given 21 0655)   morphine (PF) injection 4 mg (4 mg Intravenous Given 21 1043)       NEW PRESCRIPTIONS STARTED AT TODAY'S ER VISIT     New Prescriptions    No medications on file          =================================================================    HPI     Patient information was obtained from: patient     Use of : Yes (Phone) - Language Margarita        Michelle Mervin Ramirez is a 31 year old female with no pertinent medical history who presents by walk in for evaluation of pelvic pain and vaginal bleeding.    Patient reports that she had an early spontaneous  at 6 weeks. She was seen on  at the [facility]. Patient has been having abdominal cramping and bleeding since 2300 last night. She has bled through 1 pad since last night. Patient was prescribed misoprostol which she tried taking for the first time last night. Patient passed a clot at about midnight last night. Patient has noticed some vaginal bleeding when she wipes. She is here in the ED for 10/10 abdominal pain and cramping.     Patient notes some diaphoresis and light headedness.    Patient denies fever, syncope, chest pain, or any other complaints at this time.    REVIEW OF SYSTEMS   Review of Systems   Constitutional: Positive for diaphoresis. Negative for fever.   Cardiovascular: Negative for chest pain.   Gastrointestinal: Positive for abdominal pain.   Genitourinary: Positive for pelvic pain and vaginal bleeding.   Neurological: Positive for light-headedness. Negative for syncope.    All other systems reviewed and are negative.     PAST MEDICAL HISTORY:   History reviewed. No pertinent past medical history.    PAST SURGICAL HISTORY:     Past Surgical History:   Procedure Laterality Date     NO HISTORY OF SURGERY           CURRENT MEDICATIONS:   aspirin (ASA) 81 MG EC tablet  Prenatal Vit-Fe Fumarate-FA (PRENATAL MULTIVITAMIN W/IRON) 27-0.8 MG tablet         ALLERGIES:   No Known Allergies    FAMILY HISTORY:     Family History   Problem Relation Age of Onset     Cancer No family hx of      Diabetes No family hx of      Coronary Artery Disease No family hx of        SOCIAL HISTORY:     Social History     Socioeconomic History     Marital status:      Spouse name: Heriberto     Number of children: 0     Years of education: 15     Highest education level: Some college, no degree   Occupational History     Occupation: [employer]     Comment: Assembly line building airpline parts   Tobacco Use     Smoking status: Never Smoker     Smokeless tobacco: Never Used   Vaping Use     Vaping Use: Never used   Substance and Sexual Activity     Alcohol use: Never     Drug use: Never     Sexual activity: Yes     Partners: Male     Birth control/protection: None   Other Topics Concern     Not on file   Social History Narrative    ** Merged History Encounter **          Social Determinants of Health     Financial Resource Strain:      Difficulty of Paying Living Expenses:    Food Insecurity:      Worried About Running Out of Food in the Last Year:      Ran Out of Food in the Last Year:    Transportation Needs:      Lack of Transportation (Medical):      Lack of Transportation (Non-Medical):    Physical Activity:      Days of Exercise per Week:      Minutes of Exercise per Session:    Stress:      Feeling of Stress :    Social Connections:      Frequency of Communication with Friends and Family:      Frequency of Social Gatherings with Friends and Family:      Attends Spiritism Services:      Active  Member of Clubs or Organizations:      Attends Club or Organization Meetings:      Marital Status:    Intimate Partner Violence:      Fear of Current or Ex-Partner:      Emotionally Abused:      Physically Abused:      Sexually Abused:        VITALS:   /67   Pulse 94   Temp 97.8  F (36.6  C) (Oral)   Resp 16   Ht 1.524 m (5')   Wt 93 kg (205 lb)   LMP 07/04/2021   SpO2 97%   Breastfeeding No   BMI 40.04 kg/m      PHYSICAL EXAM     Physical Exam    Physical Exam   Constitutional: well appearing, nontoxic. Here with .  Appears in pain    Head: Atraumatic.     Nose: Nose normal.     Mouth/Throat: Oropharynx is clear and moist.     Eyes: EOM are normal. Pupils are equal, round, and reactive to light.     Ears: bilateral pearly white TMs    Neck: Normal range of motion. Neck supple.     Cardiovascular: Normal rate, regular rhythm and normal heart sounds.      Pulmonary/Chest: Normal effort  and breath sounds normal.     Abdominal: Lower abdominal tenderness palpation no guarding or rebound    Musculoskeletal: Normal range of motion.     Neurological: no deficits    Lymphatics: no edema    Skin: Skin is warm and dry.     Psychiatric: Normal mood and affect. Behavior is normal.       LAB:     All pertinent labs reviewed and interpreted.  Labs Ordered and Resulted from Time of ED Arrival Up to the Time of Departure from the ED   HCG QUANTITATIVE PREGNANCY - Abnormal; Notable for the following components:       Result Value    hCG Quantitative 4,505 (*)     All other components within normal limits   CBC WITH PLATELETS AND DIFFERENTIAL - Abnormal; Notable for the following components:    WBC Count 12.2 (*)     Absolute Neutrophils 9.9 (*)     Absolute Immature Granulocytes 0.1 (*)     All other components within normal limits   CALL   CALL   TYPE AND SCREEN, ADULT   CBC WITH PLATELETS & DIFFERENTIAL    Narrative:     The following orders were created for panel order CBC with platelets  differential.  Procedure                               Abnormality         Status                     ---------                               -----------         ------                     CBC with platelets and d...[[id number]]  Abnormal            Final result                 Please view results for these tests on the individual orders.   ABO/RH TYPE AND SCREEN    Narrative:     The following orders were created for panel order ABO/Rh type and screen.  Procedure                               Abnormality         Status                     ---------                               -----------         ------                     Adult Type and Screen[[id number]]                            Edited Result - FINAL        Please view results for these tests on the individual orders.        RADIOLOGY:     Reviewed all pertinent imaging. Please see official radiology report.  US OB <14 Weeks W Transvaginal   Final Result   IMPRESSION:       1. Irregular intrauterine gestational sac in the upper endometrial cavity. The previously seen fetal pole and yolk sac are no longer present. Findings are consistent with a failed early pregnancy.   2. Newly visualized 2.8 cm subchorionic hemorrhage.                    EKG:       I have independently reviewed and interpreted the EKG(s) documented above.      PROCEDURES:     Procedures      I, Rito Parisi, am serving as a scribe to document services personally performed by Dr. Paiz based on my observation and the provider's statements to me. I, Kelley Paiz MD attest that Rito Parisi is acting in a scribe capacity, has observed my performance of the services and has documented them in accordance with my direction.    Kelley Paiz M.D.  Emergency Medicine  [facility] EMERGENCY ROOM  [address] [address] [phone]  Dept: [phone]     Kelley Paiz MD  09/29/21 [id number]

## 2021-09-29 NOTE — ANESTHESIA POSTPROCEDURE EVALUATION
Patient: Michelle Ramirez    Procedure(s):  DILATION AND CURETTAGE, UTERUS, USING SUCTION    Diagnosis:Incomplete miscarriage [O03.4]  Diagnosis Additional Information: No value filed.    Anesthesia Type:  MAC    Note:  Disposition: Outpatient   Postop Pain Control: Uneventful            Sign Out: Well controlled pain   PONV: No   Neuro/Psych: Uneventful            Sign Out: Acceptable/Baseline neuro status   Airway/Respiratory: Uneventful            Sign Out: AIRWAY IN SITU/Resp. Support   CV/Hemodynamics: Uneventful            Sign Out: Acceptable CV status; No obvious hypovolemia; No obvious fluid overload   Other NRE: NONE   DID A NON-ROUTINE EVENT OCCUR? No           Last vitals:  Vitals Value Taken Time   /60 09/29/21 1530   Temp 36.5  C (97.7  F) 09/29/21 1530   Pulse 92 09/29/21 1530   Resp 16 09/29/21 1530   SpO2 99 % 09/29/21 1530       Electronically Signed By: Bill Miranda MD  September 29, 2021  4:33 PM

## 2021-09-29 NOTE — ED TRIAGE NOTES
Pt to the ED with her s/o with a c/o pelvic pain and vag bleeding since about 2300 last night - states 1 pad since 2300 last night. Pt was about 6 weeks pregnant, was given pills to pass fetal demise, she took the pill at 2100 last night. Pt is alert, orient and appropriate. Her skin is warm and dry. Her s/o is by her side.

## 2021-10-01 LAB
PATH REPORT.COMMENTS IMP SPEC: NORMAL
PATH REPORT.COMMENTS IMP SPEC: NORMAL
PATH REPORT.FINAL DX SPEC: NORMAL
PATH REPORT.GROSS SPEC: NORMAL
PATH REPORT.MICROSCOPIC SPEC OTHER STN: NORMAL
PATH REPORT.RELEVANT HX SPEC: NORMAL
PHOTO IMAGE: NORMAL

## 2021-10-01 PROCEDURE — 88305 TISSUE EXAM BY PATHOLOGIST: CPT | Mod: 26 | Performed by: PATHOLOGY

## 2021-10-17 ENCOUNTER — HEALTH MAINTENANCE LETTER (OUTPATIENT)
Age: 31
End: 2021-10-17

## 2022-07-19 ENCOUNTER — HOSPITAL ENCOUNTER (EMERGENCY)
Facility: CLINIC | Age: 32
Discharge: HOME | End: 2022-07-19
Attending: EMERGENCY MEDICINE | Admitting: EMERGENCY MEDICINE
Payer: COMMERCIAL

## 2022-07-19 VITALS
BODY MASS INDEX: 40.25 KG/M2 | OXYGEN SATURATION: 98 % | SYSTOLIC BLOOD PRESSURE: 132 MMHG | RESPIRATION RATE: 18 BRPM | DIASTOLIC BLOOD PRESSURE: 85 MMHG | HEIGHT: 60 IN | HEART RATE: 80 BPM | TEMPERATURE: 98 F | WEIGHT: 205 LBS

## 2022-07-19 DIAGNOSIS — R10.2 PELVIC PAIN IN FEMALE: ICD-10-CM

## 2022-07-19 DIAGNOSIS — O03.9 MISCARRIAGE: ICD-10-CM

## 2022-07-19 LAB
ABO/RH(D): NORMAL
ERYTHROCYTE [DISTWIDTH] IN BLOOD BY AUTOMATED COUNT: 11.9 % (ref 10–15)
HCG SERPL-ACNC: 5 MLU/ML (ref 0–4)
HCT VFR BLD AUTO: 39.5 % (ref 35–47)
HGB BLD-MCNC: 13 G/DL (ref 11.7–15.7)
MCH RBC QN AUTO: 28.4 PG (ref 26.5–33)
MCHC RBC AUTO-ENTMCNC: 32.9 G/DL (ref 31.5–36.5)
MCV RBC AUTO: 86 FL (ref 78–100)
PLATELET # BLD AUTO: 250 10E3/UL (ref 150–450)
RBC # BLD AUTO: 4.57 10E6/UL (ref 3.8–5.2)
SPECIMEN EXPIRATION DATE: NORMAL
WBC # BLD AUTO: 7.5 10E3/UL (ref 4–11)

## 2022-07-19 PROCEDURE — 86901 BLOOD TYPING SEROLOGIC RH(D): CPT | Performed by: EMERGENCY MEDICINE

## 2022-07-19 PROCEDURE — 84702 CHORIONIC GONADOTROPIN TEST: CPT | Performed by: EMERGENCY MEDICINE

## 2022-07-19 PROCEDURE — 99283 EMERGENCY DEPT VISIT LOW MDM: CPT

## 2022-07-19 PROCEDURE — 85027 COMPLETE CBC AUTOMATED: CPT | Performed by: EMERGENCY MEDICINE

## 2022-07-19 PROCEDURE — 84702 CHORIONIC GONADOTROPIN TEST: CPT | Performed by: STUDENT IN AN ORGANIZED HEALTH CARE EDUCATION/TRAINING PROGRAM

## 2022-07-19 PROCEDURE — 36415 COLL VENOUS BLD VENIPUNCTURE: CPT | Performed by: STUDENT IN AN ORGANIZED HEALTH CARE EDUCATION/TRAINING PROGRAM

## 2022-07-19 PROCEDURE — 86901 BLOOD TYPING SEROLOGIC RH(D): CPT | Performed by: STUDENT IN AN ORGANIZED HEALTH CARE EDUCATION/TRAINING PROGRAM

## 2022-07-19 PROCEDURE — 85027 COMPLETE CBC AUTOMATED: CPT | Performed by: STUDENT IN AN ORGANIZED HEALTH CARE EDUCATION/TRAINING PROGRAM

## 2022-07-19 RX ORDER — IBUPROFEN 600 MG/1
600 TABLET, FILM COATED ORAL EVERY 6 HOURS PRN
Qty: 30 TABLET | Refills: 0 | Status: ON HOLD | OUTPATIENT
Start: 2022-07-19 | End: 2023-02-28

## 2022-07-19 RX ORDER — OXYCODONE HYDROCHLORIDE 5 MG/1
5 TABLET ORAL EVERY 6 HOURS PRN
Qty: 8 TABLET | Refills: 0 | Status: SHIPPED | OUTPATIENT
Start: 2022-07-19 | End: 2022-07-22

## 2022-07-19 ASSESSMENT — ENCOUNTER SYMPTOMS: ABDOMINAL PAIN: 1

## 2022-07-19 NOTE — DISCHARGE INSTRUCTIONS
Call the OB clinic tomorrow, let them know that your hemoglobin has dropped down to 5 and that you are having a miscarriage.  I recommend setting up an appointment for later this week so that if you are continue to have pain or bleeding they contact you about medication such as Cytotec to help with passage of clots, or a D&C.     If you have multiple hours of bleeding that is more than 2 pads an hour, please come back for reevaluation in that situation as well.

## 2022-07-19 NOTE — Clinical Note
Michelle Jeffries Ramirez was seen and treated in our emergency department on 7/19/2022.  She may return to work on 07/25/2022.       If you have any questions or concerns, please don't hesitate to call.      Alexei James MD

## 2022-07-19 NOTE — ED PROVIDER NOTES
EMERGENCY DEPARTMENT ENCOUNTER      NAME: Michelle Ramirez  AGE: 32 year old female  YOB: 1990  MRN: [id number]  EVALUATION DATE & TIME: 7/19/2022  5:39 PM    PCP: Behm, Benita Kay    ED PROVIDER: Alexei James M.D.      Chief Complaint   Patient presents with     Vaginal Bleeding - Pregnant         FINAL IMPRESSION:  1. Pelvic pain in female    2. Miscarriage          ED COURSE & MEDICAL DECISION MAKING:    Pertinent Labs & Imaging studies reviewed. (See chart for details)  ED Course as of 07/19/22 2137 Tue Jul 19, 2022   1841 Patient is a 32-year-old woman who is essentially 4 weeks gestation, here with an hCG of 5 with little over 24 hours of abdominal cramping and vaginal bleeding.  No abdominal pain on exam, hCG is 5.  White count and hemoglobin are both normal.  She is normal vital signs.  Incredibly unlikely to have ectopic pregnancy that is painful with an hCG of 5.  Third miscarriage versus cramping in early pregnancy.  Discharged with plan for outpatient OB follow-up later this week for hCG recheck.  Discussed return precautions     After my initial evaluation the patient did call me back in the room.  She states that she had an hCG of about 60 last week, and she just remembered.  This would indicate that she has actually had a miscarriage and the current pain she is having is passage of tissue.  I provide my condolences, sent home with ibuprofen, oxycodone, and outpatient follow-up with OB to see if she would need any continued medical or surgical options for pain, bleeding process.  We also discussed return precautions such as severe bleeding.      Additional ED Course Timestamps:  6:32 PM I met with the patient, obtained history, performed an initial exam, and discussed options and plan for diagnostics and treatment here in the ED.      At the conclusion of the encounter I discussed the results of all of the tests and the disposition. The questions were answered. The patient or family  "acknowledged understanding and was agreeable with the care plan.       MEDICATIONS GIVEN IN THE EMERGENCY:  Medications - No data to display      NEW PRESCRIPTIONS STARTED AT TODAY'S ER VISIT  Discharge Medication List as of 7/19/2022  7:11 PM      START taking these medications    Details   !! ibuprofen (ADVIL/MOTRIN) 600 MG tablet Take 1 tablet (600 mg) by mouth every 6 hours as needed for moderate pain, Disp-30 tablet, R-0, Local Print      oxyCODONE (ROXICODONE) 5 MG tablet Take 1 tablet (5 mg) by mouth every 6 hours as needed for pain, Disp-8 tablet, R-0, Local Print       !! - Potential duplicate medications found. Please discuss with provider.             =================================================================    HPI    Patient information was obtained from: patient and patient's sister    Use of : Yes (In person- Sister)        Michelle Ramirez is a 32 year old female with no pertinent history who presents to this ED for evaluation of vaginal bleeding.    The patient's last menstrual period was 6/6/2022-6/9/2022 and had a positive pregnancy test last week. She had some abdominal pain on Friday that resolved over the weekend but has returned now with some bleeding. The pain does not radiate but it feels like it is \"pushing\" into her lower abdomen. She says that her bleeding is similar to her period and that her pain is at about 2/10.     Of note, she had a miscarriage with her first child.       REVIEW OF SYSTEMS   Review of Systems   Gastrointestinal: Positive for abdominal pain.   Genitourinary: Positive for vaginal bleeding.   All other systems reviewed and are negative.       PAST MEDICAL HISTORY:  History reviewed. No pertinent past medical history.    PAST SURGICAL HISTORY:  Past Surgical History:   Procedure Laterality Date     DILATION AND CURETTAGE SUCTION N/A 9/29/2021    Procedure: DILATION AND CURETTAGE, UTERUS, USING SUCTION;  Surgeon: Pinky Damon DO;  Location: [facility]" Main OR     NO HISTORY OF SURGERY             CURRENT MEDICATIONS:    No current facility-administered medications for this encounter.     Current Outpatient Medications   Medication     ibuprofen (ADVIL/MOTRIN) 600 MG tablet     oxyCODONE (ROXICODONE) 5 MG tablet     aspirin (ASA) 81 MG EC tablet     ibuprofen (ADVIL/MOTRIN) 200 MG tablet     Prenatal Vit-Fe Fumarate-FA (PRENATAL MULTIVITAMIN W/IRON) 27-0.8 MG tablet       ALLERGIES:  No Known Allergies    FAMILY HISTORY:  Family History   Problem Relation Age of Onset     Cancer No family hx of      Diabetes No family hx of      Coronary Artery Disease No family hx of        SOCIAL HISTORY:   Social History     Socioeconomic History     Marital status: Single     Spouse name: Heriberto     Number of children: 0     Years of education: 15     Highest education level: Some college, no degree   Occupational History     Occupation: [employer]     Comment: Assembly line building airpline parts   Tobacco Use     Smoking status: Never Smoker     Smokeless tobacco: Never Used   Vaping Use     Vaping Use: Never used   Substance and Sexual Activity     Alcohol use: Never     Drug use: Never     Sexual activity: Yes     Partners: Male     Birth control/protection: None   Social History Narrative    ** Merged History Encounter **            VITALS:  /85   Pulse 80   Temp 98  F (36.7  C) (Temporal)   Resp 18   Ht 1.524 m (5')   Wt 93 kg (205 lb)   LMP 06/02/2022 (Approximate)   SpO2 98%   BMI 40.04 kg/m      PHYSICAL EXAM    Constitutional: Well developed, well nourished. Comfortable appearing.  HENT: Normocephalic, atraumatic, mucous membranes moist, nose normal. Neck- Supple, gross ROM intact.   Eyes: Pupils mid-range, conjunctiva without injection, no discharge.   Respiratory: Clear to auscultation bilaterally, no respiratory distress, no wheezing, speaks full sentences easily. No cough.  Cardiovascular: Normal heart rate, regular rhythm, no murmurs.   GI:  Soft, no tenderness to deep palpation in all quadrants, no masses.  Musculoskeletal: Moving all 4 extremities intentionally and without pain. No obvious deformity.  Skin: Warm, dry, no rash.  Neurologic: Alert & oriented x 3, cranial nerves grossly intact.  Psychiatric: Affect normal, cooperative.       LAB:  All pertinent labs reviewed and interpreted.  Labs Ordered and Resulted from Time of ED Arrival to Time of ED Departure   HCG QUANTITATIVE PREGNANCY - Abnormal       Result Value    hCG Quantitative 5 (*)    CBC WITH PLATELETS - Normal    WBC Count 7.5      RBC Count 4.57      Hemoglobin 13.0      Hematocrit 39.5      MCV 86      MCH 28.4      MCHC 32.9      RDW 11.9      Platelet Count 250     ABO AND RH    ABO/RH(D) B POS      SPECIMEN EXPIRATION DATE [id number]         RADIOLOGY:  Reviewed all pertinent imaging. Please see official radiology report.  OB  US 1st trimester w transvag    (Results Pending)       EKG:    All EKG interpretations will be found in ED course above.      I, Philip Terry am serving as a scribe to document services personally performed by Dr. Alexei James based on my observation and the provider's statements to me. I, Alexei James MD attest that Philip Terry is acting in a scribe capacity, has observed my performance of the services and has documented them in accordance with my direction.    Alexei James M.D.  Emergency Medicine  [facility] EMERGENCY ROOM  [address] [address]  [phone]  Dept: [phone]     Alexei James MD  07/19/22 6375

## 2022-07-19 NOTE — ED TRIAGE NOTES
Pt arrives to ED with c/o vaginally bleeding during pregnancy. Pt states bleeding started Friday but got worse this morning. Pt states she is only going through 1 pad a day. Pt unsure how far along she is because her ultrasound is scheduled for Friday. Also endorses to abdominal cramping. Last period in June.      Triage Assessment     Row Name 07/19/22 8048       Triage Assessment (Adult)    Airway WDL WDL       Respiratory WDL    Respiratory WDL WDL       Skin Circulation/Temperature WDL    Skin Circulation/Temperature WDL WDL       Cardiac WDL    Cardiac WDL WDL       Peripheral/Neurovascular WDL    Peripheral Neurovascular WDL WDL       Cognitive/Neuro/Behavioral WDL    Cognitive/Neuro/Behavioral WDL WDL

## 2022-08-30 ENCOUNTER — LAB REQUISITION (OUTPATIENT)
Dept: LAB | Facility: CLINIC | Age: 32
End: 2022-08-30

## 2022-08-30 DIAGNOSIS — N96 RECURRENT PREGNANCY LOSS: ICD-10-CM

## 2022-08-30 LAB
FACTOR V INTERPRETATION: NORMAL
LAB DIRECTOR COMMENTS: NORMAL
LAB DIRECTOR DISCLAIMER: NORMAL
LAB DIRECTOR INTERPRETATION: NORMAL
LAB DIRECTOR METHODOLOGY: NORMAL
LAB DIRECTOR RESULTS: NORMAL
SPECIMEN DESCRIPTION: NORMAL
TSH SERPL DL<=0.005 MIU/L-ACNC: 0.11 UIU/ML (ref 0.3–4.2)

## 2022-08-30 PROCEDURE — G0452 MOLECULAR PATHOLOGY INTERPR: HCPCS | Performed by: PATHOLOGY

## 2022-08-30 PROCEDURE — 85306 CLOT INHIBIT PROT S FREE: CPT | Performed by: STUDENT IN AN ORGANIZED HEALTH CARE EDUCATION/TRAINING PROGRAM

## 2022-08-30 PROCEDURE — 88237 TISSUE CULTURE BONE MARROW: CPT | Performed by: STUDENT IN AN ORGANIZED HEALTH CARE EDUCATION/TRAINING PROGRAM

## 2022-08-30 PROCEDURE — 84443 ASSAY THYROID STIM HORMONE: CPT | Performed by: STUDENT IN AN ORGANIZED HEALTH CARE EDUCATION/TRAINING PROGRAM

## 2022-08-30 PROCEDURE — 83036 HEMOGLOBIN GLYCOSYLATED A1C: CPT | Performed by: STUDENT IN AN ORGANIZED HEALTH CARE EDUCATION/TRAINING PROGRAM

## 2022-08-30 PROCEDURE — 88264 CHROMOSOME ANALYSIS 20-25: CPT | Performed by: STUDENT IN AN ORGANIZED HEALTH CARE EDUCATION/TRAINING PROGRAM

## 2022-08-30 PROCEDURE — 81241 F5 GENE: CPT | Performed by: STUDENT IN AN ORGANIZED HEALTH CARE EDUCATION/TRAINING PROGRAM

## 2022-08-30 PROCEDURE — 85730 THROMBOPLASTIN TIME PARTIAL: CPT | Performed by: STUDENT IN AN ORGANIZED HEALTH CARE EDUCATION/TRAINING PROGRAM

## 2022-08-30 PROCEDURE — 85303 CLOT INHIBIT PROT C ACTIVITY: CPT | Performed by: STUDENT IN AN ORGANIZED HEALTH CARE EDUCATION/TRAINING PROGRAM

## 2022-08-30 PROCEDURE — 85300 ANTITHROMBIN III ACTIVITY: CPT | Performed by: STUDENT IN AN ORGANIZED HEALTH CARE EDUCATION/TRAINING PROGRAM

## 2022-08-31 LAB
HBA1C MFR BLD: 5.2 %
HOLD SPECIMEN: NORMAL

## 2022-09-02 LAB
AT III ACT/NOR PPP CHRO: 101 % (ref 85–135)
DRVVT SCREEN RATIO: <0.65
INR PPP: 0.96 (ref 0.85–1.15)
LA PPP-IMP: NEGATIVE
LUPUS INTERPRETATION: NORMAL
PROT C ACT/NOR PPP CHRO: 119 % (ref 70–170)
PTT RATIO: 1
THROMBIN TIME: 15.9 SECONDS (ref 13–19)

## 2022-09-14 LAB — SCANNED LAB RESULT: NORMAL

## 2022-09-20 LAB — SCANNED LAB RESULT: NORMAL

## 2022-10-02 ENCOUNTER — HEALTH MAINTENANCE LETTER (OUTPATIENT)
Age: 32
End: 2022-10-02

## 2022-11-09 ENCOUNTER — LAB REQUISITION (OUTPATIENT)
Dept: LAB | Facility: CLINIC | Age: 32
End: 2022-11-09

## 2022-11-09 DIAGNOSIS — Z32.00 ENCOUNTER FOR PREGNANCY TEST, RESULT UNKNOWN: ICD-10-CM

## 2022-11-09 PROCEDURE — 84702 CHORIONIC GONADOTROPIN TEST: CPT | Performed by: OBSTETRICS & GYNECOLOGY

## 2022-11-09 PROCEDURE — 84144 ASSAY OF PROGESTERONE: CPT | Performed by: OBSTETRICS & GYNECOLOGY

## 2022-11-10 LAB
HCG INTACT+B SERPL-ACNC: 189 MIU/ML
HOLD SPECIMEN: NORMAL
PROGEST SERPL-MCNC: 18.1 NG/ML

## 2022-11-11 ENCOUNTER — LAB REQUISITION (OUTPATIENT)
Dept: LAB | Facility: CLINIC | Age: 32
End: 2022-11-11

## 2022-11-11 DIAGNOSIS — Z33.1 PREGNANT STATE, INCIDENTAL: ICD-10-CM

## 2022-11-11 PROCEDURE — 84702 CHORIONIC GONADOTROPIN TEST: CPT | Performed by: OBSTETRICS & GYNECOLOGY

## 2022-11-12 LAB — HCG INTACT+B SERPL-ACNC: 526 MIU/ML

## 2022-12-05 ENCOUNTER — LAB REQUISITION (OUTPATIENT)
Dept: LAB | Facility: CLINIC | Age: 32
End: 2022-12-05

## 2022-12-05 DIAGNOSIS — Z36.9 ENCOUNTER FOR ANTENATAL SCREENING, UNSPECIFIED: ICD-10-CM

## 2022-12-05 PROCEDURE — 86901 BLOOD TYPING SEROLOGIC RH(D): CPT | Performed by: NURSE PRACTITIONER

## 2022-12-05 PROCEDURE — 85014 HEMATOCRIT: CPT | Performed by: NURSE PRACTITIONER

## 2022-12-05 PROCEDURE — 87340 HEPATITIS B SURFACE AG IA: CPT | Performed by: NURSE PRACTITIONER

## 2022-12-05 PROCEDURE — 86780 TREPONEMA PALLIDUM: CPT | Performed by: NURSE PRACTITIONER

## 2022-12-05 PROCEDURE — 87086 URINE CULTURE/COLONY COUNT: CPT | Performed by: NURSE PRACTITIONER

## 2022-12-05 PROCEDURE — 86762 RUBELLA ANTIBODY: CPT | Performed by: NURSE PRACTITIONER

## 2022-12-05 PROCEDURE — 86850 RBC ANTIBODY SCREEN: CPT | Performed by: NURSE PRACTITIONER

## 2022-12-05 PROCEDURE — 87389 HIV-1 AG W/HIV-1&-2 AB AG IA: CPT | Performed by: NURSE PRACTITIONER

## 2022-12-05 PROCEDURE — 85041 AUTOMATED RBC COUNT: CPT | Performed by: NURSE PRACTITIONER

## 2022-12-05 PROCEDURE — 86803 HEPATITIS C AB TEST: CPT | Performed by: NURSE PRACTITIONER

## 2022-12-06 LAB
ABO/RH(D): NORMAL
ANTIBODY SCREEN: NEGATIVE
BASOPHILS # BLD AUTO: 0 10E3/UL (ref 0–0.2)
BASOPHILS NFR BLD AUTO: 0 %
EOSINOPHIL # BLD AUTO: 0 10E3/UL (ref 0–0.7)
EOSINOPHIL NFR BLD AUTO: 0 %
ERYTHROCYTE [DISTWIDTH] IN BLOOD BY AUTOMATED COUNT: 12.6 % (ref 10–15)
HBV SURFACE AG SERPL QL IA: NONREACTIVE
HCT VFR BLD AUTO: 45.5 % (ref 35–47)
HCV AB SERPL QL IA: NONREACTIVE
HGB BLD-MCNC: 15.1 G/DL (ref 11.7–15.7)
HIV 1+2 AB+HIV1 P24 AG SERPL QL IA: NONREACTIVE
IMM GRANULOCYTES # BLD: 0 10E3/UL
IMM GRANULOCYTES NFR BLD: 0 %
LYMPHOCYTES # BLD AUTO: 1.8 10E3/UL (ref 0.8–5.3)
LYMPHOCYTES NFR BLD AUTO: 32 %
MCH RBC QN AUTO: 28.7 PG (ref 26.5–33)
MCHC RBC AUTO-ENTMCNC: 33.2 G/DL (ref 31.5–36.5)
MCV RBC AUTO: 86 FL (ref 78–100)
MONOCYTES # BLD AUTO: 0.5 10E3/UL (ref 0–1.3)
MONOCYTES NFR BLD AUTO: 9 %
NEUTROPHILS # BLD AUTO: 3.3 10E3/UL (ref 1.6–8.3)
NEUTROPHILS NFR BLD AUTO: 59 %
NRBC # BLD AUTO: 0 10E3/UL
NRBC BLD AUTO-RTO: 0 /100
PLATELET # BLD AUTO: ABNORMAL 10*3/UL
RBC # BLD AUTO: 5.27 10E6/UL (ref 3.8–5.2)
RBC MORPH BLD: NORMAL
RUBV IGG SERPL QL IA: 2.18 INDEX
RUBV IGG SERPL QL IA: POSITIVE
SPECIMEN EXPIRATION DATE: NORMAL
T PALLIDUM AB SER QL: NONREACTIVE
WBC # BLD AUTO: 5.6 10E3/UL (ref 4–11)

## 2022-12-07 LAB — BACTERIA UR CULT: NORMAL

## 2023-01-11 ENCOUNTER — TRANSFERRED RECORDS (OUTPATIENT)
Dept: HEALTH INFORMATION MANAGEMENT | Facility: CLINIC | Age: 33
End: 2023-01-11

## 2023-01-12 ENCOUNTER — MEDICAL CORRESPONDENCE (OUTPATIENT)
Dept: HEALTH INFORMATION MANAGEMENT | Facility: CLINIC | Age: 33
End: 2023-01-12

## 2023-01-12 ENCOUNTER — LAB REQUISITION (OUTPATIENT)
Dept: LAB | Facility: CLINIC | Age: 33
End: 2023-01-12

## 2023-01-12 DIAGNOSIS — E05.90 THYROTOXICOSIS, UNSPECIFIED WITHOUT THYROTOXIC CRISIS OR STORM: ICD-10-CM

## 2023-01-12 LAB
T4 FREE SERPL-MCNC: 1.06 NG/DL (ref 0.9–1.7)
TSH SERPL DL<=0.005 MIU/L-ACNC: 1.83 UIU/ML (ref 0.3–4.2)

## 2023-01-12 PROCEDURE — 84443 ASSAY THYROID STIM HORMONE: CPT | Performed by: OBSTETRICS & GYNECOLOGY

## 2023-01-12 PROCEDURE — 84439 ASSAY OF FREE THYROXINE: CPT | Performed by: OBSTETRICS & GYNECOLOGY

## 2023-01-13 ENCOUNTER — TELEPHONE (OUTPATIENT)
Dept: EDUCATION SERVICES | Facility: CLINIC | Age: 33
End: 2023-01-13

## 2023-01-20 ENCOUNTER — ALLIED HEALTH/NURSE VISIT (OUTPATIENT)
Dept: EDUCATION SERVICES | Facility: CLINIC | Age: 33
End: 2023-01-20
Payer: COMMERCIAL

## 2023-01-20 VITALS — WEIGHT: 208.5 LBS | BODY MASS INDEX: 40.72 KG/M2

## 2023-01-20 DIAGNOSIS — O24.410 DIET CONTROLLED GESTATIONAL DIABETES MELLITUS (GDM), ANTEPARTUM: Primary | ICD-10-CM

## 2023-01-20 PROCEDURE — G0108 DIAB MANAGE TRN  PER INDIV: HCPCS | Performed by: DIETITIAN, REGISTERED

## 2023-01-20 RX ORDER — LANCETS
EACH MISCELLANEOUS
Qty: 100 EACH | Refills: 4 | Status: SHIPPED | OUTPATIENT
Start: 2023-01-20

## 2023-01-20 RX ORDER — BLOOD SUGAR DIAGNOSTIC
STRIP MISCELLANEOUS
Qty: 200 STRIP | Refills: 6 | Status: SHIPPED | OUTPATIENT
Start: 2023-01-20

## 2023-01-20 RX ORDER — BLOOD PRESSURE TEST KIT
1 KIT MISCELLANEOUS DAILY
Qty: 100 EACH | Refills: 2 | Status: SHIPPED | OUTPATIENT
Start: 2023-01-20

## 2023-01-20 NOTE — LETTER
1/20/2023         RE: Michelle Ramirez  [address]  [place] [address]        Dear Colleague,    Thank you for referring your patient, Michelle Ramirez, to the [facility]. Please see a copy of my visit note below.    Diabetes Self-Management Education & Support/ 75 minutes through professional interperterRu    SUBJECTIVE/OBJECTIVE:  Presents for education related to gestational diabetes.    Accompanied by: Self, Other (Heriberto, spouse)  Diabetes management related comments/concerns: no  Gestational weeks: 14 weeks  Hospital planned for delivery: [facility]  Next OB Visit Date: 01/31/23  Number of previous pregnancies: 2 (miscarriages x 2)  Had any babies over 9 lbs: No   Previously had Gestational Diabetes: No  Have you ever had thyroid problems or taken thyroid medication?: Unknown   Heart disease, mitral valve prolapse or rheumatic fever?: Unknown   Hypertension : Unknown  High Cholesterol: Unknown   High Triglycerides: Unknown   Do you use tobacco products?: No   Do you drink beer, wine or hard liquor?: No   Cultural Influences/Ethnic Background:  Not  or       Estimated Date of Delivery: 7/18/23  Next OB visit: 1/31/23      1 hour OGTT  No results found for: GLU1      3 hour OGTT    Fasting  No results found for: GTTGF    1 hour  No results found for: GTTG1    2 hour  No results found for: GTTG2    3 hour  No results found for: GTTG3    Lifestyle and Health Behaviors:  Pre-pregnancy weight (lbs): 205  Exercise:: Currently not exercising  Barrier to exercise: Safety  Cultural/Pentecostal diet restrictions?: No  Meal planning/habits: Low salt, None  How many times a week on average do you eat food made away from home (restaurant/take-out)?: 1 (Tamy meal,sausage, Hmong food, egg rolls   Meals include: Dinner, Breakfast, Lunch   Breakfast: traditional hmong meal  Lunch: traditional Hmong meal  Dinner: traditional hmong meal  Snacks: not a big snacker  Other: weekends  3 meals/day, work days 2 days/week  just pork, not a big fruit fan, has occasionally, Naab sweets, tapioca drink-1-2x/week  Beverages: Water  How many servings of fruits/vegetables per day: 2  Biggest challenges to healthy eating: Portion control  Pre-chari vitamin?: Yes  Supplements?: No  Experiencing nausea?: No  Experiencing heartburn?: No    Healthy Coping:  Emotional response to diabetes: Ready to learn, Fear/Anxiety  Informal Support system:: Family, Partner  Stage of change: PREPARATION (Decided to change - considering how)    Current Management:  Taking medications for gestational diabetes?: No    ASSESSMENT:  Initial GDM education for Michelle who was accompanied by her spouse,  Heriberto. Michelle is consuming 2-3 meals/day, a traditional Hmong diet, she seldom snacks. She is hydrating with water, no exercise routine.      INTERVENTION:  Patient was instructed on Accu-Chek Guide Me meter and was able to provide an accurate return demonstration. Patient's fasting blood glucose reading today was 80 mg/dL.    Educational topics covered today:  GDM diagnosis, pathophysiology, Risks and Complications of GDM, Means of controlling GDM, Using a Blood Glucose Monitor, Blood Glucose Goals, Logging and Interpreting Glucose Results, Ketone Testing, When to Call a Diabetes Educator or OB Provider, Healthy Eating During Pregnancy, Counting Carbohydrates, Meal Planning for GDM, and Physical Activity    Educational materials provided today:   [place] Understanding Gestational Diabetes  GDM Log Book  Sharps Disposal  Care After Delivery  Accu-Chek Guide Me meter kit    Pt verbalized understanding of concepts discussed and recommendations provided today.     PLAN:  Check glucose 3-4 times daily, before breakfast and 1 hour after each meal.     Check Ketones daily for one week, if negative, reduce testing to once a week.     Physical activity recommended: per OB discretion, if allowed 10-15 minutes after meals.  Meal Plan:   1. Limit  sweet drinks, or sweets to no more than once per week.  2. Limit rice and noodle portions to no more than one small bowl per meal. Increase  portions of meat and vegetables at meals.   3. Avoid having cold cereal in the morning.  4. Snack ideas: milk, fruit, pork.       Call [phone] or Stri[employer] message diabetes educator if 3 or more blood sugars are above the goal in 1 week, if ketones are positive, or with questions/concerns.    Follow up with educator on 1/27/23      Time Spent: 75 minutes  Encounter Type: Individual    Any diabetes medication dose changes were made via the CDE Protocol and Collaborative Practice Agreement with the patient's OB/GYN provider. A copy of this encounter was shared with the provider.

## 2023-01-20 NOTE — PROGRESS NOTES
Diabetes Self-Management Education & Support/ 75 minutes through professional interperterRu    SUBJECTIVE/OBJECTIVE:  Presents for education related to gestational diabetes.    Accompanied by: Self, Other (Heriberto, spouse)  Diabetes management related comments/concerns: no  Gestational weeks: 14 weeks  [facility] planned [facility]: [facility]  Next OB Visit Date: 01/31/23  Number of previous pregnancies: 2 (miscarriages x 2)  Had any babies over 9 lbs: No   Previously had Gestational Diabetes: No  Have you ever had thyroid problems or taken thyroid medication?: Unknown   Heart disease, mitral valve prolapse or rheumatic fever?: Unknown   Hypertension : Unknown  High Cholesterol: Unknown   High Triglycerides: Unknown   Do you use tobacco products?: No   Do you drink beer, wine or hard liquor?: No   Cultural Influences/Ethnic Background:  Not  or       Estimated Date of Delivery: 7/18/23  Next OB visit: 1/31/23      1 hour OGTT  No results found for: GLU1      3 hour OGTT    Fasting  No results found for: GTTGF    1 hour  No results found for: GTTG1    2 hour  No results found for: GTTG2    3 hour  No results found for: GTTG3    Lifestyle and Health Behaviors:  Pre-pregnancy weight (lbs): 205  Exercise:: Currently not exercising  Barrier to exercise: Safety  Cultural/Synagogue diet restrictions?: No  Meal planning/habits: Low salt, None  How many times a week on average do you eat food made away from home (restaurant/take-out)?: 1 (Tamy meal,sausage, Hmong food, egg rolls   Meals include: Dinner, Breakfast, Lunch   Breakfast: traditional hmong meal  Lunch: traditional Hmong meal  Dinner: traditional hmong meal  Snacks: not a big snacker  Other: weekends 3 meals/day, work days 2 days/week  just pork, not a big fruit fan, has occasionally, Naab sweets, tapioca drink-1-2x/week  Beverages: Water  How many servings of fruits/vegetables per day: 2  Biggest challenges to healthy eating: Portion  control  Pre-chari vitamin?: Yes  Supplements?: No  Experiencing nausea?: No  Experiencing heartburn?: No    Healthy Coping:  Emotional response to diabetes: Ready to learn, Fear/Anxiety  Informal Support system:: Family, Partner  Stage of change: PREPARATION (Decided to change - considering how)    Current Management:  Taking medications for gestational diabetes?: No    ASSESSMENT:  Initial GDM education for Michelle who was accompanied by her spouse,  Heriberto. Michelle is consuming 2-3 meals/day, a traditional Hmong diet, she seldom snacks. She is hydrating with water, no exercise routine.      INTERVENTION:  Patient was instructed on Accu-Chek Guide Me meter and was able to provide an accurate return demonstration. Patient's fasting blood glucose reading today was 80 mg/dL.    Educational topics covered today:  GDM diagnosis, pathophysiology, Risks and Complications of GDM, Means of controlling GDM, Using a Blood Glucose Monitor, Blood Glucose Goals, Logging and Interpreting Glucose Results, Ketone Testing, When to Call a Diabetes Educator or OB Provider, Healthy Eating During Pregnancy, Counting Carbohydrates, Meal Planning for GDM, and Physical Activity    Educational materials provided today:   [place] Understanding Gestational Diabetes  GDM Log Book  Sharps Disposal  Care After Delivery  Accu-Chek Guide Me meter kit    Pt verbalized understanding of concepts discussed and recommendations provided today.     PLAN:  Check glucose 3-4 times daily, before breakfast and 1 hour after each meal.     Check Ketones daily for one week, if negative, reduce testing to once a week.     Physical activity recommended: per OB discretion, if allowed 10-15 minutes after meals.  Meal Plan:   1. Limit sweet drinks, or sweets to no more than once per week.  2. Limit rice and noodle portions to no more than one small bowl per meal. Increase  portions of meat and vegetables at meals.   3. Avoid having cold cereal in the morning.  4. Snack  ideas: milk, fruit, pork.       Call [phone] or [employer] message diabetes educator if 3 or more blood sugars are above the goal in 1 week, if ketones are positive, or with questions/concerns.    Follow up with educator on 1/27/23      Time Spent: 75 minutes  Encounter Type: Individual  Radha STILL    Any diabetes medication dose changes were made via the CDE Protocol and Collaborative Practice Agreement with the patient's OB/GYN provider. A copy of this encounter was shared with the provider.

## 2023-01-20 NOTE — PATIENT INSTRUCTIONS
Limit sweet drinks, or sweets to no more than once per week.  Limit rice and noodle portions to no more than one small bowl per meal. Increase  portions of meat and vegetables at meals.   Avoid having cold cereal in the morning.  Snack ideas: milk, fruit.   Test blood sugar in the morning before you eat, and 1-2 hours after a meal.  Add in 10-15 minutes of walking after a meal if your OB is okay with you being active  Check urine daily in the morning when your first use the restroom.

## 2023-01-27 ENCOUNTER — VIRTUAL VISIT (OUTPATIENT)
Dept: EDUCATION SERVICES | Facility: CLINIC | Age: 33
End: 2023-01-27
Payer: COMMERCIAL

## 2023-01-27 DIAGNOSIS — O24.410 DIET CONTROLLED GESTATIONAL DIABETES MELLITUS (GDM), ANTEPARTUM: Primary | ICD-10-CM

## 2023-01-27 PROCEDURE — 98968 PH1 ASSMT&MGMT NQHP 21-30: CPT | Mod: 95 | Performed by: DIETITIAN, REGISTERED

## 2023-01-27 NOTE — LETTER
1/27/2023         RE: Michelle Ramirez  [address]  [place] [address]        Dear Colleague,    Thank you for referring your patient, Michelle Ramirez, to the [facility]. Please see a copy of my visit note below.    Type of Service: Telephone Visit/ 27 minutes through professional  Won [employer]    Originating Location (Patient Location): Home  Distant Location (Provider Location): [facility]  Mode of Communication:  Telephone    Telephone Visit Start Time: 4p  Telephone Visit End Time (telephone visit stop time): 4:27    How would patient like to obtain AVS? MyChart     Diabetes Self-Management Education & Support    SUBJECTIVE/OBJECTIVE:  Presents for education related to gestational diabetes.     Cultural Influences/Ethnic Background:  Not  or       LMP 06/02/2022 (Approximate)         Estimated Date of Delivery: 7/18/23  Next OB visit: 1/31/23    Blood Glucose/Ketone Log:   Date Ketones Fasting Post Breakfast Post Lunch Post Supper   1/21 5 82 145(noodles, larger portions) 158(rice, larger portions) 115   1/22 5 87 117  165(larger rice portions)   1/23 5 84 150(rice/banana) 82 138   1/24 15 83 104 109 138   1/25 40 81 99 88+2 89+3   1/26 40 87 88 155(onion rings, fried chix) 137   1/27 0 82 89 134           Current Management:  Diet       ASSESSMENT:  Michelle has significantly cut back on her rice and noodle intake as during this past week. She has seen the impact of larger portions on her BG and other food choices.   Review of BG log, discussion on elevated BG, food choices and portions. Discussed positive ketones and adding in a snack before bedtime.  Discussed asking OB if walking is allowed at her next visit on 1/31/23.     Ketones: negative x 1, trace x 3, small x 1, moderate x 2.   Fasting blood glucoses: 100% in target.  After breakfast: 71% in target.  After lunch: 67% in target.  After dinner: 83% in target.    Educational Materials  provided today:  [place] Preventing Diabetes    PLAN:  Check glucose 4 times daily.    Check ketones once a week when readings are consistently negative.    Ask OB about exercise recommendations    Continue to follow recommended meal plan:  1. Limit sweet drinks, or sweets to no more than once per week.  2. Limit rice and noodle portions to no more than one small bowl per meal. Increase  portions of meat and vegetables at meals.   3. Avoid having cold cereal in the morning.  4. Snack ideas: milk, fruit, pork.      Call [phone] if 3 or more blood sugars are above the goal in 1 week or if ketones are positive.    Follow up with educator on 2/3/23      Time Spent: 27 minutes  Encounter Type: Individual    Any diabetes medication dose changes were made via the CDE Protocol and Collaborative Practice Agreement with the patient's OB/GYN provider. A copy of this encounter was shared with the provider.

## 2023-01-28 NOTE — PROGRESS NOTES
Type of Service: Telephone Visit/ 27 minutes through professional  Won Gold    Originating Location (Patient Location): Home  Distant Location (Provider Location): [facility]  Mode of Communication:  Telephone    Telephone Visit Start Time: 4p  Telephone Visit End Time (telephone visit stop time): 4:27    How would patient like to obtain AVS? [place]     Diabetes Self-Management Education & Support    SUBJECTIVE/OBJECTIVE:  Presents for education related to gestational diabetes.     Cultural Influences/Ethnic Background:  Not  or       LMP 06/02/2022 (Approximate)         Estimated Date of Delivery: 7/18/23  Next OB visit: 1/31/23    Blood Glucose/Ketone Log:   Date Ketones Fasting Post Breakfast Post Lunch Post Supper   1/21 5 82 145(noodles, larger portions) 158(rice, larger portions) 115   1/22 5 87 117  165(larger rice portions)   1/23 5 84 150(rice/banana) 82 138   1/24 15 83 104 109 138   1/25 40 81 99 88+2 89+3   1/26 40 87 88 155(onion rings, fried chix) 137   1/27 0 82 89 134           Current Management:  Diet       ASSESSMENT:  Michelle has significantly cut back on her rice and noodle intake as during this past week. She has seen the impact of larger portions on her BG and other food choices.   Review of BG log, discussion on elevated BG, food choices and portions. Discussed positive ketones and adding in a snack before bedtime.  Discussed asking OB if walking is allowed at her next visit on 1/31/23.     Ketones: negative x 1, trace x 3, small x 1, moderate x 2.   Fasting blood glucoses: 100% in target.  After breakfast: 71% in target.  After lunch: 67% in target.  After dinner: 83% in target.    Educational Materials provided today:  [facility] Preventing Diabetes    PLAN:  Check glucose 4 times daily.    Check ketones once a week when readings are consistently negative.    Ask OB about exercise recommendations    Continue to follow recommended meal  plan:  1. Limit sweet drinks, or sweets to no more than once per week.  2. Limit rice and noodle portions to no more than one small bowl per meal. Increase  portions of meat and vegetables at meals.   3. Avoid having cold cereal in the morning.  4. Snack ideas: milk, fruit, pork.      Call [phone] if 3 or more blood sugars are above the goal in 1 week or if ketones are positive.    Follow up with educator on 2/3/23      Time Spent: 27 minutes  Encounter Type: Individual  Radha Ocampo RD, TESSY    Any diabetes medication dose changes were made via the CDE Protocol and Collaborative Practice Agreement with the patient's OB/GYN provider. A copy of this encounter was shared with the provider.

## 2023-01-28 NOTE — PATIENT INSTRUCTIONS
Check glucose 4 times daily.    Check ketones once a week when readings are consistently negative.    Ask OB about exercise recommendations    Continue to follow recommended meal plan:  Limit sweet drinks, or sweets to no more than once per week.  Limit rice and noodle portions to no more than one small bowl per meal. Increase  portions of meat and vegetables at meals.   Avoid having cold cereal in the morning.  Snack ideas: milk, fruit, pork.      Call [phone] if 3 or more blood sugars are above the goal in 1 week or if ketones are positive.    Follow up with educator on 2/3/23

## 2023-01-31 ENCOUNTER — LAB REQUISITION (OUTPATIENT)
Dept: LAB | Facility: CLINIC | Age: 33
End: 2023-01-31

## 2023-01-31 DIAGNOSIS — Z3A.16 16 WEEKS GESTATION OF PREGNANCY: ICD-10-CM

## 2023-01-31 PROCEDURE — 82105 ALPHA-FETOPROTEIN SERUM: CPT | Performed by: NURSE PRACTITIONER

## 2023-02-03 ENCOUNTER — VIRTUAL VISIT (OUTPATIENT)
Dept: EDUCATION SERVICES | Facility: CLINIC | Age: 33
End: 2023-02-03
Payer: COMMERCIAL

## 2023-02-03 DIAGNOSIS — O24.410 DIET CONTROLLED GESTATIONAL DIABETES MELLITUS (GDM), ANTEPARTUM: Primary | ICD-10-CM

## 2023-02-03 LAB
# FETUSES US: NORMAL
AFP MOM SERPL: 0.78
AFP SERPL-MCNC: 22 NG/ML
AGE - REPORTED: 31.4 YR
CURRENT SMOKER: NO
FAMILY MEMBER DISEASES HX: NO
GA METHOD: NORMAL
GA: NORMAL WK
IDDM PATIENT QL: NO
INTEGRATED SCN PATIENT-IMP: NORMAL
SPECIMEN DRAWN SERPL: NORMAL

## 2023-02-03 PROCEDURE — 98967 PH1 ASSMT&MGMT NQHP 11-20: CPT | Mod: 95 | Performed by: DIETITIAN, REGISTERED

## 2023-02-03 NOTE — LETTER
2/3/2023         RE: Michelle Ramirez  [address]  [place] [address]        Dear Colleague,    Thank you for referring your patient, Michelle Ramirez, to the [facility]. Please see a copy of my visit note below.    Type of Service: Telephone Visit/ 20 minutes through professional  Kiki Bhat    Originating Location (Patient Location): Home  Distant Location (Provider Location): [facility]  Mode of Communication:  Telephone    Telephone Visit Start Time: 4:00  Telephone Visit End Time (telephone visit stop time): 4:20    How would patient like to obtain AVS? MyChart     Diabetes and Pregnancy Follow-up  Type of Service: Telephone Visit    How would patient like to obtain AVS? Not needed    Subjective/Objective:    Michelle Ramirez was called for a scheduled BG review. Last date of communication was: 1/27/2023.    Gestational diabetes is being managed with diet and activity    Taking diabetes medications: no    Estimated Date of Delivery: 7/18/23  Next OB visit:     Blood Glucose/Ketone Log:    Date Ketones Fasting Post Breakfast Post Lunch Post Supper   1/28 0 80 101 81 114   1/29 0 84 92  102   1/30 0 78 110 135 154(spring roll)   1/31 0 83 93 146(more rice) 92   2/1 5 78 153( more rice) 90 102   2/2 15 83 112 105 128   2/3 40 73 121 119          Assessment:  Michelle is doing well with limiting portions of CHO foods, she is having just a small scoop of rice with meals. If she is hungry between meals she has a small piece of candy or fruit. Michelle reported that her OB gave her permission to add in activity, she has no restrictions. Michelle is really focusing on healthy lifestyle as she expressed concern about not wanting to go on insulin.       Ketones: negative x 4, trace x 1, small x 1, medium x 1, patient reported that she went to bed early on the nights before these days, and did not eat in the pm. .   Fasting blood glucoses: 100% in target.  After breakfast: 86%  in target.  After lunch: 83% in target.  After dinner: 83% in target.    Plan/Response:  Check glucose 4 times daily.     Check ketones once a week when readings are consistently negative.     Ask OB about exercise recommendations     Continue to follow recommended meal plan:  1. Limit sweet drinks, or sweets to no more than once per week.  2. Limit rice and noodle portions to no more than one small bowl per meal. Increase  portions of meat and vegetables at meals.   3. Avoid having cold cereal in the morning.  4. Snack ideas: milk, fruit, pork.    5. If ketones are 15 or 40, add in a glass of milk before bedtime     Call [phone] if 3 or more blood sugars are above the goal in 1 week or if ketones are positive.     Follow up with educator on 2/14/23      Time Spent: 20 minutes  Radha Tolentino RD, [facility]     Any diabetes medication dose changes were made via the CDE Protocol and Collaborative Practice Agreement with the patient's OB/GYN provider. A copy of this encounter was shared with the provider.

## 2023-02-03 NOTE — PATIENT INSTRUCTIONS
Check glucose 4 times daily.     Check ketones once a week when readings are consistently negative.     Ask OB about exercise recommendations     Continue to follow recommended meal plan:  Limit sweet drinks, or sweets to no more than once per week.  Limit rice and noodle portions to no more than one small bowl per meal. Increase  portions of meat and vegetables at meals.   Avoid having cold cereal in the morning.  Snack ideas: milk, fruit, pork.    If ketones are 15 or 40, add in a glass of milk before bedtime     Call [phone] if 3 or more blood sugars are above the goal in 1 week or if ketones are positive.     Follow up with educator on 2/14/23

## 2023-02-03 NOTE — PROGRESS NOTES
Type of Service: Telephone Visit/ 20 minutes through professional  Kiki Bhat    Originating Location (Patient Location): Home  Distant Location (Provider Location): [facility]  Mode of Communication:  Telephone    Telephone Visit Start Time: 4:00  Telephone Visit End Time (telephone visit stop time): 4:20    How would patient like to obtain AVS? MyChart     Diabetes and Pregnancy Follow-up  Type of Service: Telephone Visit    How would patient like to obtain AVS? Not needed    Subjective/Objective:    Michelle Ramirez was called for a scheduled BG review. Last date of communication was: 1/27/2023.    Gestational diabetes is being managed with diet and activity    Taking diabetes medications: no    Estimated Date of Delivery: 7/18/23  Next OB visit:     Blood Glucose/Ketone Log:    Date Ketones Fasting Post Breakfast Post Lunch Post Supper   1/28 0 80 101 81 114   1/29 0 84 92  102   1/30 0 78 110 135 154(spring roll)   1/31 0 83 93 146(more rice) 92   2/1 5 78 153( more rice) 90 102   2/2 15 83 112 105 128   2/3 40 73 121 119          Assessment:  Michelle is doing well with limiting portions of CHO foods, she is having just a small scoop of rice with meals. If she is hungry between meals she has a small piece of candy or fruit. Michelle reported that her OB gave her permission to add in activity, she has no restrictions. Michelle is really focusing on healthy lifestyle as she expressed concern about not wanting to go on insulin.       Ketones: negative x 4, trace x 1, small x 1, medium x 1, patient reported that she went to bed early on the nights before these days, and did not eat in the pm. .   Fasting blood glucoses: 100% in target.  After breakfast: 86% in target.  After lunch: 83% in target.  After dinner: 83% in target.    Plan/Response:  Check glucose 4 times daily.     Check ketones once a week when readings are consistently negative.     Ask OB about exercise recommendations     Continue to  follow recommended meal plan:  1. Limit sweet drinks, or sweets to no more than once per week.  2. Limit rice and noodle portions to no more than one small bowl per meal. Increase  portions of meat and vegetables at meals.   3. Avoid having cold cereal in the morning.  4. Snack ideas: milk, fruit, pork.    5. If ketones are 15 or 40, add in a glass of milk before bedtime     Call [phone] if 3 or more blood sugars are above the goal in 1 week or if ketones are positive.     Follow up with educator on 2/14/23      Time Spent: 20 minutes  Radha Tolentino RD, [facility]     Any diabetes medication dose changes were made via the CDE Protocol and Collaborative Practice Agreement with the patient's OB/GYN provider. A copy of this encounter was shared with the provider.

## 2023-02-11 ENCOUNTER — HEALTH MAINTENANCE LETTER (OUTPATIENT)
Age: 33
End: 2023-02-11

## 2023-02-13 ENCOUNTER — HOSPITAL ENCOUNTER (EMERGENCY)
Facility: CLINIC | Age: 33
Discharge: HOME | End: 2023-02-13
Attending: STUDENT IN AN ORGANIZED HEALTH CARE EDUCATION/TRAINING PROGRAM | Admitting: STUDENT IN AN ORGANIZED HEALTH CARE EDUCATION/TRAINING PROGRAM
Payer: COMMERCIAL

## 2023-02-13 VITALS
BODY MASS INDEX: 40.84 KG/M2 | SYSTOLIC BLOOD PRESSURE: 131 MMHG | TEMPERATURE: 98.3 F | HEIGHT: 60 IN | OXYGEN SATURATION: 100 % | WEIGHT: 208 LBS | RESPIRATION RATE: 16 BRPM | HEART RATE: 95 BPM | DIASTOLIC BLOOD PRESSURE: 80 MMHG

## 2023-02-13 DIAGNOSIS — N76.0 ABSCESS OF VAGINA: ICD-10-CM

## 2023-02-13 PROCEDURE — 10060 I&D ABSCESS SIMPLE/SINGLE: CPT

## 2023-02-13 PROCEDURE — 99283 EMERGENCY DEPT VISIT LOW MDM: CPT | Mod: 25

## 2023-02-13 RX ORDER — CLINDAMYCIN HCL 300 MG
300 CAPSULE ORAL 4 TIMES DAILY
Qty: 40 CAPSULE | Refills: 0 | Status: SHIPPED | OUTPATIENT
Start: 2023-02-13 | End: 2023-02-23

## 2023-02-13 ASSESSMENT — ACTIVITIES OF DAILY LIVING (ADL)
ADLS_ACUITY_SCORE: 33
ADLS_ACUITY_SCORE: 33

## 2023-02-13 NOTE — Clinical Note
Michelle Ramirez was seen and treated in our emergency department on 2/13/2023.  She may return to work on 02/15/2023.       If you have any questions or concerns, please don't hesitate to call.      Marge Hernandez RN

## 2023-02-14 ENCOUNTER — VIRTUAL VISIT (OUTPATIENT)
Dept: EDUCATION SERVICES | Facility: CLINIC | Age: 33
End: 2023-02-14
Payer: COMMERCIAL

## 2023-02-14 DIAGNOSIS — O24.410 DIET CONTROLLED GESTATIONAL DIABETES MELLITUS (GDM), ANTEPARTUM: Primary | ICD-10-CM

## 2023-02-14 PROCEDURE — 98967 PH1 ASSMT&MGMT NQHP 11-20: CPT | Mod: 95 | Performed by: DIETITIAN, REGISTERED

## 2023-02-14 NOTE — PROGRESS NOTES
Type of Service: Telephone Visit/ 20 minutes   Through professional  Lizandro Ramirez    Originating Location (Patient Location): Home  Distant Location (Provider Location): [facility]  Mode of Communication:  Telephone    Telephone Visit Start Time: 4:01p  Telephone Visit End Time (telephone visit stop time): 4:22    How would patient like to obtain AVS? MyChart      Diabetes and Pregnancy Follow-up  Type of Service: Telephone Visit    How would patient like to obtain AVS? Not needed    Subjective/Objective:    Michelle Ramirez was called for a scheduled BG review. Last date of communication was: 2/3/23.    Gestational diabetes is being managed with diet and activity    Taking diabetes medications: no    Estimated Date of Delivery: Jul 18, 2023   Next OB visit: 2/28/23    Blood Glucose/Ketone Log:    Date Ketones Fasting Post Breakfast Post Lunch Post Supper   2/4 0 80 103 134 109+2   2/5 5 85 126 136 107   2/6 15 -- 92 125 105   2/7 5 88 109 134 167(extra rice)   2/8 15 86 104 111 140   2/9 0 87 127 98 121   2/10 5 84 94+2 121 121   2/11 0 86 175(cinnamon roll) -- 132   2/12 0 92 83 126 114   2/13 0 90 170(Sweet/sour chix) 72 --   2/14 40 87 103 124          Assessment:      Michelle is snacking more when hungry, she is having rice, but small portions. At work she is on her feet quite a bit, she is walking 5-10 minutes after her evening meal. No questions or concerns today.     Ketones: negative x 5, trace x 3, small x 2, moderate x 1( patient did not have dinner the night before).   Fasting blood glucoses: 100% in target.  After breakfast: 82% in target.  After lunch: 100% in target.  After dinner: 89% in target.    Plan/Response:  Check ketones daily, if they are negative or trace for the next 4-5 days, then decrease testing to weekly.  Test blood sugar four times daily: fasting and one-two hours after meals.  Meal plan:   1. Limit sweet drinks, or sweets to no more than once per  week.  2. Limit rice and noodle portions to no more than one small bowl per meal. Increase  portions of meat and vegetables at meals.   3. Avoid having cold cereal in the morning.  4. Snack ideas: milk, fruit, pork.    5. If ketones are 15 or 40, add in a glass of milk before bedtime    Follow up with educator on 3/1/23    Call [phone] if you have three elevated blood sugar readings in one week that you cannot explain.     Check ketones daily, if they are negative or trace for the next 4-5 days, then decrease testing to weekly.  Test blood sugar four times daily: fasting and one-two hours after meals.        Time Spent: 20 minutes  Radha Tolentino RD, [facility]     Any diabetes medication dose changes were made via the CDE Protocol and Collaborative Practice Agreement with the patient's OB/GYN provider. A copy of this encounter was shared with the provider.

## 2023-02-14 NOTE — PATIENT INSTRUCTIONS
Check ketones daily, if they are negative or trace for the next 4-5 days, then decrease testing to weekly.    Test blood sugar four times daily: fasting and one-two hours after meals.    Meal plan:   Limit sweet drinks, or sweets to no more than once per week.  Limit rice and noodle portions to no more than one small bowl per meal. Increase  portions of meat and vegetables at meals.   Avoid having cold cereal in the morning.  Snack ideas: milk, fruit, pork.    If ketones are 15 or 40, add in a glass of milk before bedtime    Follow up with educator on 3/1/23    Call [phone] if you have three elevated blood sugar readings in one week that you cannot explain.

## 2023-02-14 NOTE — ED PROVIDER NOTES
EMERGENCY DEPARTMENT ENCOUNTER      NAME: Michelle Ramirez  AGE: 33 year old female  YOB: 1990  MRN: [id number]  EVALUATION DATE & TIME: 2/13/2023  6:50 PM    PCP: Behm, Benita Kay    ED PROVIDER: Cas Mejia M.D.      Chief Complaint   Patient presents with     Vaginal Pain         FINAL IMPRESSION:  1. Abscess of vagina          ED COURSE & MEDICAL DECISION MAKING:    Pertinent Labs & Imaging studies reviewed. (See chart for details)  33 year old female presents to the Emergency Department for evaluation of lesion on her vagina.  Patient is 16 weeks pregnant.  Pelvic exam shows on area of small fluctuance and pain superior lateral to her labia majora.  Does not seem to represent any sort of Bartholin cyst.  No intra-abdominal drainage so I believe any sort of large sized abscess is unlikely.  Performed a needle aspiration and got at most 2 to 3 cc of purulent fluid.  Patient tolerated procedure well.  She does have a primary OB doctor that she has seen regularly but had not spoken to her about this small abscess.  Certainly I considered more extensive abscess or intra-abdominal pathology however CT scan of the abdomen risks would not outweigh benefits at this time and formal ultrasound did not seem necessary but was considered.  Patient is nontoxic well-appearing I think any sort of sepsis is unlikely therefore blood work was not done.  I will start the patient on some outpatient antibiotics orally and have her follow-up with her OB doctor within the next 1 to 2 days.    At the conclusion of the encounter I discussed the results of all of the tests and the disposition. The questions were answered. The patient or family acknowledged understanding and was agreeable with the care plan.          8:08 PM I met patient for initial interview and exam.   9:50 PM I performed pelvic exam with RN chaperone.   10:00 PM We discussed the plan for discharge and the patient is agreeable. Reviewed supportive cares,  symptomatic treatment, outpatient follow up, and reasons to return to the Emergency Department. All questions and concerns were addressed. Patient to be discharged by ED RN.       Medical Decision Making    History:    Supplemental history from: Family Member/Significant Other    External Record(s) reviewed: Documented in chart, if applicable.    Work Up:    Chart documentation includes differential considered and any EKGs or imaging independently interpreted by provider, where specified.    In additional to work up documented, I considered the following work up: Documented in chart, if applicable.    External consultation:    Discussion of management with another provider: Documented in chart, if applicable    Complicating factors:    Care impacted by chronic illness: N/A    Care affected by social determinants of health: N/A    Disposition considerations: Discharge. I prescribed additional prescription strength medication(s) as charted. See documentation for any additional details.           minutes of critical care time     MEDICATIONS GIVEN IN THE EMERGENCY:  Medications - No data to display    NEW PRESCRIPTIONS STARTED AT TODAY'S ER VISIT  Discharge Medication List as of 2/13/2023 10:11 PM      START taking these medications    Details   clindamycin (CLEOCIN) 300 MG capsule Take 1 capsule (300 mg) by mouth 4 times daily for 10 days, Disp-40 capsule, R-0, Local Print                =================================================================    HPI    Patient information was obtained from: patient's significant other    Use of :         Michelle Ramirez is a 33 year old female with no pertinent medical history who presents for evaluation of vaginal pain.    On 2/9 (4 days ago), the patient noted a small bump on her vagina. Since then, the bump has increased in size, with worsening pain. No drainage. She now has difficulty walking and turning over in bed secondary to pain.       REVIEW OF SYSTEMS    Review of Systems   Genitourinary: Positive for vaginal pain.        Positive for vaginal lesion.   All other systems reviewed and are negative.      PAST MEDICAL HISTORY:  History reviewed. No pertinent past medical history.    PAST SURGICAL HISTORY:  Past Surgical History:   Procedure Laterality Date     DILATION AND CURETTAGE SUCTION N/A 9/29/2021    Procedure: DILATION AND CURETTAGE, UTERUS, USING SUCTION;  Surgeon: Pinky Damon DO;  Location: [facility] Main OR     NO HISTORY OF SURGERY             CURRENT MEDICATIONS:    ACCU-CHEK GUIDE test strip  acetone urine (KETOSTIX) test strip  Alcohol Swabs PADS  aspirin (ASA) 81 MG EC tablet  blood glucose monitoring (SOFTCLIX) lancets  ibuprofen (ADVIL/MOTRIN) 200 MG tablet  ibuprofen (ADVIL/MOTRIN) 600 MG tablet  Prenatal Vit-Fe Fumarate-FA (PRENATAL MULTIVITAMIN W/IRON) 27-0.8 MG tablet        ALLERGIES:  No Known Allergies    FAMILY HISTORY:  Family History   Problem Relation Age of Onset     Cancer No family hx of      Diabetes No family hx of      Coronary Artery Disease No family hx of        SOCIAL HISTORY:   Social History     Socioeconomic History     Marital status: Single     Spouse name: Heriberto     Number of children: 0     Years of education: 15     Highest education level: Some college, no degree   Occupational History     Occupation: [employer]     Comment: Assembly line building airpline parts   Tobacco Use     Smoking status: Never     Smokeless tobacco: Never   Vaping Use     Vaping Use: Never used   Substance and Sexual Activity     Alcohol use: Never     Drug use: Never     Sexual activity: Yes     Partners: Male     Birth control/protection: None   Social History Narrative    ** Merged History Encounter **            VITALS:  /80   Pulse 95   Temp 98.3  F (36.8  C)   Resp 16   Ht 1.524 m (5')   Wt 94.3 kg (208 lb)   LMP 06/02/2022 (Approximate)   SpO2 100%   BMI 40.62 kg/m        PHYSICAL EXAM    Constitutional: Well  developed, Well nourished, NAD, GCS 15  HENT: Normocephalic, Atraumatic, Bilateral external ears normal, Oropharynx normal, mucous membranes moist, Nose normal. Neck-  Normal range of motion, No tenderness, Supple, No stridor.  Eyes: PERRL, EOMI, Conjunctiva normal, No discharge.   Respiratory: Normal breath sounds, No respiratory distress, No wheezing, Speaks full sentences easily. No cough.  Cardiovascular: Normal heart rate, Regular rhythm, No murmurs, No rubs, No gallops. Chest wall nontender.  GI:Soft, No tenderness, No masses, No flank tenderness. No rebound or guarding.   : Swelling and redness to upper portion of right labia with small amount of fluctuance. Chaperone present.   Musculoskeletal: 2+ DP pulses. No edema.No cyanosis, No clubbing. Good range of motion in all major joints. No tenderness to palpation or major deformities noted.   Integument: Warm, Dry, No erythema, No rash. No petechiae.   Neurologic: Alert & oriented x 3,  CN 3-12 intact Normal motor function, Normal sensory function, No focal deficits noted. Normal gait. Normal finger to nose bilaterally  Psychiatric: Affect normal, Judgment normal, Mood normal. Cooperative.         PROCEDURES:   [facility]    PROCEDURE: -Incision/Drainage    Date/Time: 2/26/2023 8:28 AM  Performed by: Cas Mejia MD  Authorized by: Cas Mejia MD     Risks, benefits and alternatives discussed.      LOCATION:      Type:  Abscess    PRE-PROCEDURE DETAILS:     Skin preparation:  Betadine    PROCEDURE TYPE:     Complexity:  Simple    ANESTHESIA (see MAR for exact dosages):     Anesthesia method:  Local infiltration    Local anesthetic:  Lidocaine 1% WITH epi    PROCEDURE DETAILS:     Needle aspiration: yes      Needle size:  18 G    Incision types:  Single straight    Incision depth:  Subcutaneous    Drainage:  Purulent    Drainage amount:  Moderate    PROCEDURE  Describe Procedure: Needle aspiration of abscess yielded 2-3cc  purulent fluid  Patient Tolerance:  Patient tolerated the procedure well with no immediate complications        I, Elma Barnhart, am serving as a scribe to document services personally performed by Dr. Cas Mejia based on my observation and the provider's statements to me. I, Cas Mejia MD attest that Elma Barnhart is acting in a scribe capacity, has observed my performance of the services and has documented them in accordance with my direction.    Cas Mejia M.D.  Emergency Medicine  [facility] EMERGENCY ROOM  [address] [place] [id number] [phone]  Dept: [phone]       Cas Mejia MD  02/26/23 0831

## 2023-02-14 NOTE — LETTER
2/14/2023         RE: Michelle Ramirez  [address]  [facility] [address]        Dear Colleague,    Thank you for referring your patient, Michelle Ramirez, to the [facility]. Please see a copy of my visit note below.    Type of Service: Telephone Visit/ 20 minutes   Through professional  Lizandro Ramirez    Originating Location (Patient Location): Home  Distant Location (Provider Location): [facility]  Mode of Communication:  Telephone    Telephone Visit Start Time: 4:01p  Telephone Visit End Time (telephone visit stop time): 4:22    How would patient like to obtain AVS? MyChart      Diabetes and Pregnancy Follow-up  Type of Service: Telephone Visit    How would patient like to obtain AVS? Not needed    Subjective/Objective:    Michelle Ramirez was called for a scheduled BG review. Last date of communication was: 2/3/23.    Gestational diabetes is being managed with diet and activity    Taking diabetes medications: no    Estimated Date of Delivery: Jul 18, 2023   Next OB visit: 2/28/23    Blood Glucose/Ketone Log:    Date Ketones Fasting Post Breakfast Post Lunch Post Supper   2/4 0 80 103 134 109+2   2/5 5 85 126 136 107   2/6 15 -- 92 125 105   2/7 5 88 109 134 167(extra rice)   2/8 15 86 104 111 140   2/9 0 87 127 98 121   2/10 5 84 94+2 121 121   2/11 0 86 175(cinnamon roll) -- 132   2/12 0 92 83 126 114   2/13 0 90 170(Sweet/sour chix) 72 --   2/14 40 87 103 124          Assessment:      Michelle is snacking more when hungry, she is having rice, but small portions. At work she is on her feet quite a bit, she is walking 5-10 minutes after her evening meal. No questions or concerns today.     Ketones: negative x 5, trace x 3, small x 2, moderate x 1( patient did not have dinner the night before).   Fasting blood glucoses: 100% in target.  After breakfast: 82% in target.  After lunch: 100% in target.  After dinner: 89% in target.    Plan/Response:  Check ketones  daily, if they are negative or trace for the next 4-5 days, then decrease testing to weekly.  Test blood sugar four times daily: fasting and one-two hours after meals.  Meal plan:   1. Limit sweet drinks, or sweets to no more than once per week.  2. Limit rice and noodle portions to no more than one small bowl per meal. Increase  portions of meat and vegetables at meals.   3. Avoid having cold cereal in the morning.  4. Snack ideas: milk, fruit, pork.    5. If ketones are 15 or 40, add in a glass of milk before bedtime    Follow up with educator on 3/1/23    Call [phone] if you have three elevated blood sugar readings in one week that you cannot explain.     Check ketones daily, if they are negative or trace for the next 4-5 days, then decrease testing to weekly.  Test blood sugar four times daily: fasting and one-two hours after meals.        Time Spent: 20 minutes  Radha Tolentino RD, [facility]     Any diabetes medication dose changes were made via the CDE Protocol and Collaborative Practice Agreement with the patient's OB/GYN provider. A copy of this encounter was shared with the provider.

## 2023-02-14 NOTE — ED TRIAGE NOTES
Around Thursday, pt noted a bump on her vagina which has become worse and more painful since first noticed.     Triage Assessment     Row Name 02/13/23 1811       Triage Assessment (Adult)    Airway WDL WDL       Respiratory WDL    Respiratory WDL WDL       Skin Circulation/Temperature WDL    Skin Circulation/Temperature WDL WDL       Cardiac WDL    Cardiac WDL WDL       Peripheral/Neurovascular WDL    Peripheral Neurovascular WDL WDL       Cognitive/Neuro/Behavioral WDL    Cognitive/Neuro/Behavioral WDL WDL

## 2023-02-27 ENCOUNTER — HOSPITAL ENCOUNTER (EMERGENCY)
Facility: HOSPITAL | Age: 33
Discharge: HOME | End: 2023-02-27
Attending: EMERGENCY MEDICINE | Admitting: EMERGENCY MEDICINE
Payer: COMMERCIAL

## 2023-02-27 VITALS
SYSTOLIC BLOOD PRESSURE: 136 MMHG | OXYGEN SATURATION: 98 % | DIASTOLIC BLOOD PRESSURE: 66 MMHG | BODY MASS INDEX: 41.21 KG/M2 | RESPIRATION RATE: 16 BRPM | TEMPERATURE: 98.7 F | HEART RATE: 100 BPM | WEIGHT: 211 LBS

## 2023-02-27 DIAGNOSIS — R33.9 URINARY RETENTION: ICD-10-CM

## 2023-02-27 DIAGNOSIS — K59.00 CONSTIPATION, UNSPECIFIED CONSTIPATION TYPE: ICD-10-CM

## 2023-02-27 PROBLEM — O24.419 GESTATIONAL DIABETES MELLITUS: Status: ACTIVE | Noted: 2023-01-11

## 2023-02-27 LAB
ALBUMIN UR-MCNC: NEGATIVE MG/DL
APPEARANCE UR: CLEAR
BILIRUB UR QL STRIP: NEGATIVE
COLOR UR AUTO: COLORLESS
GLUCOSE UR STRIP-MCNC: NEGATIVE MG/DL
HGB UR QL STRIP: ABNORMAL
KETONES UR STRIP-MCNC: 10 MG/DL
LEUKOCYTE ESTERASE UR QL STRIP: NEGATIVE
NITRATE UR QL: NEGATIVE
PH UR STRIP: 6 [PH] (ref 5–7)
RBC URINE: 1 /HPF
SP GR UR STRIP: 1.01 (ref 1–1.03)
UROBILINOGEN UR STRIP-MCNC: <2 MG/DL
WBC URINE: 0 /HPF

## 2023-02-27 PROCEDURE — 81001 URINALYSIS AUTO W/SCOPE: CPT | Performed by: EMERGENCY MEDICINE

## 2023-02-27 PROCEDURE — 99283 EMERGENCY DEPT VISIT LOW MDM: CPT | Mod: 25

## 2023-02-27 NOTE — ED TRIAGE NOTES
Patient presents here for evaluation of inability to void for the past 24 hours. She is reporting significant discomfort from retention. She is also 20 weeks gestation. No vaginal bleeding or discharge. She has not evacuated a bowel movement, which is a change in her elimination pattern. Discusion with Labor and Delivery charge nurse reveal that patient can be seen in ED. Discussion with Dr. Mejia regarding blood pressure that patient can be seen in the ED and recheck blood pressure after catheter placement.      Triage Assessment       Row Name 02/27/23 1455       Triage Assessment (Adult)    Airway WDL WDL       Respiratory WDL    Respiratory WDL WDL       Skin Circulation/Temperature WDL    Skin Circulation/Temperature WDL WDL       Cardiac WDL    Cardiac WDL WDL       Peripheral/Neurovascular WDL    Peripheral Neurovascular WDL WDL       Cognitive/Neuro/Behavioral WDL    Cognitive/Neuro/Behavioral WDL WDL

## 2023-02-27 NOTE — ED PROVIDER NOTES
EMERGENCY DEPARTMENT ENCOUNTER      NAME: Michelle Ramirez  AGE: 33 year old female  YOB: 1990  MRN: [id number]  EVALUATION DATE & TIME: No admission date for patient encounter.    PCP: Behm, Benita Kay    ED PROVIDER: Mague Murdock MD    Chief Complaint   Patient presents with     Urinary Retention         FINAL IMPRESSION:  1. Urinary retention    2. Constipation, unspecified constipation type          ED COURSE & MEDICAL DECISION MAKING:    Pertinent Labs & Imaging studies reviewed. (See chart for details)  33 year old female with history of G1, P0 at approximately 20 weeks here with inability to void for the last 24 hours with constipation for the last several days.  Strongly suspect that urinary retention is due to the size of current fetus within the pelvis compressing the urethra/bladder in combination with her constipation.  Gillespie catheter inserted, 1+ liter of urine out.  Patient got relief with the above.  Blood pressure and heart rate normalized.  Urinalysis was sent to rule out cath associated UTI, unremarkable.  She does not have any lateralizing flank pain to suspect a ureterolithiasis.  We will discharged home with indwelling Gillespie catheter, and have her follow-up with her OB/GYN as scheduled for tomorrow.      ED Course as of 02/27/23 2109   Mon Feb 27, 2023   1515 I met with patient for initial interview and encounter. PPE worn includes exam gloves and surgical mask.    1522 Pulse(!): 129   1522 BP(!): 143/107   1602 Gillespie catheter inserted.  1200 mL urine retention   1654 Pulse: 100       Medical Decision Making    History:    Supplemental history from: Documented in chart, if applicable and Family Member/Significant Other    External Record(s) reviewed: Documented in chart, if applicable. and Other: Inpatient and outpatient computer records reviewed.     Work Up:    Chart documentation includes differential considered and any EKGs or imaging independently interpreted by provider, where  specified.    In additional to work up documented, I considered the following work up: Documented in chart, if applicable.    External consultation:    Discussion of management with another provider: Documented in chart, if applicable    Complicating factors:    Care impacted by chronic illness: N/A    Care affected by social determinants of health: Access to Medical Care    Disposition considerations: Discharge. No recommendations on prescription strength medication(s). N/A.           At the conclusion of the encounter I discussed the results of all of the tests and the disposition. The questions were answered. The patient or family acknowledged understanding and was agreeable with the care plan.      MEDICATIONS GIVEN IN THE EMERGENCY:  Medications - No data to display    NEW PRESCRIPTIONS STARTED AT TODAY'S ER VISIT  Discharge Medication List as of 2/27/2023  5:08 PM             =================================================================    HPI    Patient information was obtained from: Patient     Use of Intrepreter: N/A        Michelle Mervin Ramirez is a 33 year old female with no pertinent medical history who presents via walk in accompanied by family member for evaluation of urinary retention.     Per  family member, The patient presents with urinary retention since Sunday morning. The patient was hypertensive and tachycardic prior to han cathter. She is 20 weeks pregnant with her first pregnancy. She endorses constipation. The patient next OB GYN appointment is schedule tomorrow. She denies abdominal pain, dysuria, hematuria, fever, chills, and any other complaints or concerns at the moment      REVIEW OF SYSTEMS  Constitutional: Denies fever, chills, weight loss or weakness  GI:  Denies abdominal pain, nausea, vomiting, diarrhea  : Denies dysuria, denies hematuria. Positive for urinary retention and constipation.    Musculoskeletal:  Denies any new muscle/joint pain, swelling or loss of  function.      PAST MEDICAL HISTORY:  Past Medical History:   Diagnosis Date     Gestational diabetes mellitus 2023       PAST SURGICAL HISTORY:  Past Surgical History:   Procedure Laterality Date     DILATION AND CURETTAGE SUCTION N/A 2021    Procedure: DILATION AND CURETTAGE, UTERUS, USING SUCTION;  Surgeon: Pinky Damon DO;  Location: [facility] Main OR     NO HISTORY OF SURGERY         CURRENT MEDICATIONS:    Prior to Admission Medications   Prescriptions Last Dose Informant Patient Reported? Taking?   ACCU-CHEK GUIDE test strip   No No   Sig: Use to test blood sugar four times daily.   Alcohol Swabs PADS   No No   Si each daily   Prenatal Vit-Fe Fumarate-FA (PRENATAL MULTIVITAMIN W/IRON) 27-0.8 MG tablet   No No   Sig: Take 1 tablet by mouth daily   acetone urine (KETOSTIX) test strip   No No   Sig: Use to test urine daily in the morning.   aspirin (ASA) 81 MG EC tablet   No No   Sig: Take 1 tablet (81 mg) by mouth daily   Patient not taking: Reported on 2021   blood glucose monitoring (SOFTCLIX) lancets   No No   Sig: Use to test blood sugar four times daily.   ibuprofen (ADVIL/MOTRIN) 200 MG tablet   No No   Sig: Take 3 tablets (600 mg) by mouth every 4 hours as needed for mild pain   ibuprofen (ADVIL/MOTRIN) 600 MG tablet   No No   Sig: Take 1 tablet (600 mg) by mouth every 6 hours as needed for moderate pain      Facility-Administered Medications: None       ALLERGIES:  No Known Allergies    FAMILY HISTORY:  Family History   Problem Relation Age of Onset     Cancer No family hx of      Diabetes No family hx of      Coronary Artery Disease No family hx of        SOCIAL HISTORY:  Social History     Tobacco Use     Smoking status: Never     Smokeless tobacco: Never   Vaping Use     Vaping Use: Never used   Substance Use Topics     Alcohol use: Never     Drug use: Never        VITALS:  Patient Vitals for the past 24 hrs:   BP Temp Temp src Pulse Resp SpO2 Weight   23 1636 136/66 --  -- 100 16 98 % --   02/27/23 1449 (!) 143/107 98.7  F (37.1  C) Oral (!) 129 18 98 % 95.7 kg (211 lb)       PHYSICAL EXAM    General Appearance: Well-appearing, well-nourished, no acute distress   Head:  Normocephalic  Cardio: Initially tachycardic and hypertensive normalized upon recheck after Gillespie catheter placement  Pulm:  No respiratory distress  Back:  No CVA tenderness, normal ROM  Abdomen:  Soft, non-tender, non distended,no rebound or guarding. Obese abdomen. Gravid just below umbilicus.   Extremities: Normal gait  Skin:  Skin warm, dry, no rashes  Neuro:  Alert and oriented ×3     RADIOLOGY/LABS:  Reviewed all pertinent imaging. Please see official radiology report. All pertinent labs reviewed and interpreted.    Results for orders placed or performed during the hospital encounter of 02/27/23   UA with Microscopic reflex to Culture    Specimen: Urine, Gillespie Catheter   Result Value Ref Range    Color Urine Colorless Colorless, Straw, Light Yellow, Yellow    Appearance Urine Clear Clear    Glucose Urine Negative Negative mg/dL    Bilirubin Urine Negative Negative    Ketones Urine 10 (A) Negative mg/dL    Specific Gravity Urine 1.006 1.001 - 1.030    Blood Urine 0.2 mg/dL (A) Negative    pH Urine 6.0 5.0 - 7.0    Protein Albumin Urine Negative Negative mg/dL    Urobilinogen Urine <2.0 <2.0 mg/dL    Nitrite Urine Negative Negative    Leukocyte Esterase Urine Negative Negative    RBC Urine 1 <=2 /HPF    WBC Urine 0 <=5 /HPF         The creation of this record is based on the scribe s observations of the work being performed by Mague Murdock MD and the provider s statements to them. It was created on his behalf by Constance Patel, a trained medical scribe. This document has been checked and approved by the attending provider.    Mague Murdock MD  Emergency Medicine  [facility] EMERGENCY DEPARTMENT  [facility] [facility] [address] [phone]  Dept:  [phone]       Mague Murdock MD  02/27/23 2110

## 2023-02-27 NOTE — DISCHARGE INSTRUCTIONS
Leave the catheter in.  There is no evidence of infection in the urine.  Your blood pressure remains minimally elevated here, you can have this rechecked at OB clinic tomorrow.    Colace, MiraLAX, prunes as needed for constipation.

## 2023-02-28 ENCOUNTER — HOSPITAL ENCOUNTER (OUTPATIENT)
Facility: HOSPITAL | Age: 33
End: 2023-02-28
Admitting: OBSTETRICS & GYNECOLOGY
Payer: COMMERCIAL

## 2023-02-28 ENCOUNTER — HOSPITAL ENCOUNTER (OUTPATIENT)
Facility: HOSPITAL | Age: 33
Discharge: HOME | End: 2023-02-28
Attending: OBSTETRICS & GYNECOLOGY | Admitting: OBSTETRICS & GYNECOLOGY
Payer: COMMERCIAL

## 2023-02-28 ENCOUNTER — HOSPITAL ENCOUNTER (EMERGENCY)
Facility: HOSPITAL | Age: 33
End: 2023-02-28
Payer: COMMERCIAL

## 2023-02-28 VITALS
BODY MASS INDEX: 40.84 KG/M2 | TEMPERATURE: 98.5 F | WEIGHT: 208 LBS | HEART RATE: 96 BPM | RESPIRATION RATE: 18 BRPM | HEIGHT: 60 IN | DIASTOLIC BLOOD PRESSURE: 79 MMHG | SYSTOLIC BLOOD PRESSURE: 129 MMHG

## 2023-02-28 VITALS
SYSTOLIC BLOOD PRESSURE: 137 MMHG | DIASTOLIC BLOOD PRESSURE: 60 MMHG | HEART RATE: 108 BPM | TEMPERATURE: 98 F | RESPIRATION RATE: 22 BRPM | OXYGEN SATURATION: 98 %

## 2023-02-28 PROCEDURE — 250N000013 HC RX MED GY IP 250 OP 250 PS 637: Performed by: STUDENT IN AN ORGANIZED HEALTH CARE EDUCATION/TRAINING PROGRAM

## 2023-02-28 RX ORDER — LIDOCAINE 40 MG/G
CREAM TOPICAL
Status: DISCONTINUED | OUTPATIENT
Start: 2023-02-28 | End: 2023-03-01 | Stop reason: HOSPADM

## 2023-02-28 RX ADMIN — SODIUM PHOSPHATE, DIBASIC AND SODIUM PHOSPHATE, MONOBASIC 1 ENEMA: 7; 19 ENEMA RECTAL at 21:30

## 2023-02-28 RX ADMIN — MAGNESIUM HYDROXIDE 30 ML: 400 SUSPENSION ORAL at 21:29

## 2023-02-28 ASSESSMENT — ACTIVITIES OF DAILY LIVING (ADL): ADLS_ACUITY_SCORE: 31

## 2023-03-01 ENCOUNTER — VIRTUAL VISIT (OUTPATIENT)
Dept: EDUCATION SERVICES | Facility: CLINIC | Age: 33
End: 2023-03-01
Payer: COMMERCIAL

## 2023-03-01 DIAGNOSIS — O24.419 GESTATIONAL DIABETES MELLITUS: Primary | ICD-10-CM

## 2023-03-01 PROCEDURE — G0108 DIAB MANAGE TRN  PER INDIV: HCPCS | Mod: 95 | Performed by: DIETITIAN, REGISTERED

## 2023-03-01 PROCEDURE — 99207 PR DROP WITH A PROCEDURE: CPT | Performed by: DIETITIAN, REGISTERED

## 2023-03-01 NOTE — PROGRESS NOTES
MD informed of bowel movement. Pt states is was very large and that they feel comfortable going home at this time. Pt educated on bowel maintenance routine at home. Pt states they understand and will continue with plan of care. Pt to be discharged to home per MD.

## 2023-03-01 NOTE — PATIENT INSTRUCTIONS
Check ketones weekly  Test blood sugar four times daily: fasting and one-two hours after meals.  Meal plan:   Limit sweet drinks, or sweets to no more than once per week.  Limit rice and noodle portions to no more than one small bowl per meal. Increase  portions of meat and vegetables at meals.   Avoid having cold cereal in the morning.  Snack ideas: milk, fruit, pork.    If ketones are 15 or 40, add in a glass of milk before bedtime     Follow up with educator on 3/15/23     Call [phone] if you have three elevated blood sugar readings in one week that you cannot explain.

## 2023-03-01 NOTE — PROGRESS NOTES
Pt reports after enema that she has had some rectal spotting of blood the last 2 days when she strains to have a bowel movement. Upon administering fleets enema, no external hemorrhoids were noted, nor rectal bleeding.

## 2023-03-01 NOTE — PROGRESS NOTES
Pt presents to [facility] from ED c c/o constipation. Pt states GDM. No other complications noted. Pt denies ctx, SROM, back pain, change to vaginal discharge and urinary patterns. Pt's partner here with her.

## 2023-03-01 NOTE — DISCHARGE INSTRUCTIONS
Discharge Instruction for Undelivered Patients      You were seen for:  pain from constipation  We Consulted: Dr. Mueller  You had (Test or Medicine):fetal heart rate, oral medication for constipation, fleets enema     Diet:   Drink 8 to 12 glasses of liquids (milk, juice, water) every day.  You may eat meals and snacks.  To manage your diabetes, follow the guidelines for eating and drinking given to you by your Clinic Provider or Diabetes Educator.       Activity:  Call your doctor or nurse midwife if your baby is moving less than usual.     Call your provider if you notice:  Swelling in your face or increased swelling in your hands or legs.  Headaches that are not relieved by Tylenol (acetaminophen).  Changes in your vision (blurring: seeing spots or stars.)  Nausea (sick to your stomach) and vomiting (throwing up).   Weight gain of 5 pounds or more per week.  Heartburn that doesn't go away.  Signs of bladder infection: pain when you urinate (use the toilet), need to go more often and more urgently.  The bag of davidson (rupture of membranes) breaks, or you notice leaking in your underwear.  Bright red blood in your underwear.  Abdominal (lower belly) or stomach pain.  For first baby: Contractions (tightening) less than 5 minutes apart for one hour or more.  *If less than 34 weeks: Contractions (tightening) more than 6 times in one hour.  Increase or change in vaginal discharge (note the color and amount)  Other: Continue with taking colace daily, miralax as needed when constipation starts    Follow-up:  As scheduled in the clinic

## 2023-03-01 NOTE — PROGRESS NOTES
Type of Service: Telephone Visit 33 minutes  With professional  Jada Ramirez    Originating Location (Patient Location): Home  Distant Location (Provider Location): [facility]  Mode of Communication:  Telephone    Telephone Visit Start Time: 3p  Telephone Visit End Time (telephone visit stop time):     How would patient like to obtain AVS? MyChart      Diabetes and Pregnancy Follow-up  Type of Service: Telephone Visit    How would patient like to obtain AVS? Not needed    Subjective/Objective:    Michelle Mervin Ramirez was called for a scheduled BG review. Last date of communication was: 2/14/23.    Gestational diabetes is being managed with diet and activity    Taking diabetes medications: no    Estimated Date of Delivery: Jul 18, 2023   Next OB visit: 3/24/23    Blood Glucose/Ketone Log:    Date Ketones Fasting Post Breakfast Post Lunch Post Supper   2/15 5 83 150(two brownies)  130   2/16 5 85 105 103 146( brenda meal)   2/17 0 78 103 94+2 148(kapun dish)   2/18 0 81 112 94 101   2/19 0 81 117 78+2 114+2   2/20  80 123 101+2 136   2/21  76 103+3 102 124   2/22  79 105 100 88   2/23  90 144(cookies) 100 111   2/24 0 87 121 83 114   2/25  82 103 193(birthday, lots of sweets)    2/26  84 129 133 123   2/27  86 85     2/28  86 158(juice/cupcake)     3/1  83 118 114          Assessment:  Michelle had six elevated PP in the past two weeks that were explainable with food choices/portions. We did discuss the importance of keeping BG at goal, limiting or avoiding sweets, and trying smaller portions of favorite dishes that elevated her BG, also adding in 10-15 minutes of walking after meals as able.       Ketones: 0-5, checking weekly.   Fasting blood glucoses: 100% in target.  After breakfast: 80% in target.  After lunch: 92% in target.  After dinner: 82% in target.    Plan/Response:  Test blood sugar four times daily: fasting and one-two hours after meals.  Check ketones weekly.   Meal plan:   1. Limit  sweet drinks, or sweets to no more than once per week.  2. Limit rice and noodle portions to no more than one small bowl per meal. Increase  portions of meat and vegetables at meals.   3. Avoid having cold cereal in the morning.  4. Snack ideas: milk, fruit, pork.    5. If ketones are 15 or 40, add in a glass of milk before bedtime     Follow up with educator on 3/15/23     Call [phone] if you have three elevated blood sugar readings in one week that you cannot explain.          Time Spent: 33 minutes  Radha Tolentino RD, [facility]     Any diabetes medication dose changes were made via the CDE Protocol and Collaborative Practice Agreement with the patient's OB/GYN provider. A copy of this encounter was shared with the provider.

## 2023-03-01 NOTE — LETTER
3/1/2023         RE: Michelle Ramirez  [address]  [facility] [address]        Dear Colleague,    Thank you for referring your patient, Michelle Ramirez, to the [facility]. Please see a copy of my visit note below.    Type of Service: Telephone Visit 33 minutes  With professional  Jada Ramirez    Originating Location (Patient Location): Home  Distant Location (Provider Location): [facility]  Mode of Communication:  Telephone    Telephone Visit Start Time: 3p  Telephone Visit End Time (telephone visit stop time):     How would patient like to obtain AVS? MyChart      Diabetes and Pregnancy Follow-up  Type of Service: Telephone Visit    How would patient like to obtain AVS? Not needed    Subjective/Objective:    Michelle Ramirez was called for a scheduled BG review. Last date of communication was: 2/14/23.    Gestational diabetes is being managed with diet and activity    Taking diabetes medications: no    Estimated Date of Delivery: Jul 18, 2023   Next OB visit: 3/24/23    Blood Glucose/Ketone Log:    Date Ketones Fasting Post Breakfast Post Lunch Post Supper   2/15 5 83 150(two brownies)  130   2/16 5 85 105 103 146( brenda meal)   2/17 0 78 103 94+2 148(kapun dish)   2/18 0 81 112 94 101   2/19 0 81 117 78+2 114+2   2/20  80 123 101+2 136   2/21  76 103+3 102 124   2/22  79 105 100 88   2/23  90 144(cookies) 100 111   2/24 0 87 121 83 114   2/25  82 103 193(birthday, lots of sweets)    2/26  84 129 133 123   2/27  86 85     2/28  86 158(juice/cupcake)     3/1  83 118 114          Assessment:  Michelle had six elevated PP in the past two weeks that were explainable with food choices/portions. We did discuss the importance of keeping BG at goal, limiting or avoiding sweets, and trying smaller portions of favorite dishes that elevated her BG, also adding in 10-15 minutes of walking after meals as able.       Ketones: 0-5, checking weekly.   Fasting blood glucoses:  100% in target.  After breakfast: 80% in target.  After lunch: 92% in target.  After dinner: 82% in target.    Plan/Response:  Test blood sugar four times daily: fasting and one-two hours after meals.  Check ketones weekly.   Meal plan:   1. Limit sweet drinks, or sweets to no more than once per week.  2. Limit rice and noodle portions to no more than one small bowl per meal. Increase  portions of meat and vegetables at meals.   3. Avoid having cold cereal in the morning.  4. Snack ideas: milk, fruit, pork.    5. If ketones are 15 or 40, add in a glass of milk before bedtime     Follow up with educator on 3/15/23     Call [phone] if you have three elevated blood sugar readings in one week that you cannot explain.          Time Spent: 33 minutes  Radha Tolentino RD, [facility]     Any diabetes medication dose changes were made via the CDE Protocol and Collaborative Practice Agreement with the patient's OB/GYN provider. A copy of this encounter was shared with the provider.

## 2023-03-15 ENCOUNTER — VIRTUAL VISIT (OUTPATIENT)
Dept: EDUCATION SERVICES | Facility: CLINIC | Age: 33
End: 2023-03-15
Payer: COMMERCIAL

## 2023-03-15 DIAGNOSIS — O24.410 DIET CONTROLLED GESTATIONAL DIABETES MELLITUS (GDM), ANTEPARTUM: Primary | ICD-10-CM

## 2023-03-15 PROCEDURE — 98968 PH1 ASSMT&MGMT NQHP 21-30: CPT | Mod: 95 | Performed by: DIETITIAN, REGISTERED

## 2023-03-15 NOTE — PROGRESS NOTES
Type of Service: Telephone Visit/ with professional interpreters # [id number] and Won Gold    Originating Location (Patient Location): Home  Distant Location (Provider Location): [facility]  Mode of Communication:  Telephone    Telephone Visit Start Time: 4p  Telephone Visit End Time (telephone visit stop time): 4:26    How would patient like to obtain AVS? MyChart    Diabetes and Pregnancy Follow-up  Type of Service: Telephone Visit    How would patient like to obtain AVS? Not needed    Subjective/Objective:    Michelle Ramirez was called for a scheduled BG review. Last date of communication was: 3/1/23.    Gestational diabetes is being managed with diet and activity    Taking diabetes medications: no    Estimated Date of Delivery: Jul 18, 2023   Next OB visit: 3/24/23    Blood Glucose/Ketone Log:    Date Ketones Fasting Post Breakfast Post Lunch Post Supper   3/2  76 93 112 120   3/3  75 173(cereal/milk) 79 114   3/4  84 223(drank more juice and ate more food because she was constipated) 63+2 87   3/5 0 81 98 119 129   3/6  82 129 154(doughnut 76   3/7  83 104 88 132   3/8  86 118 107 125   3/9  86 164(steamed rice roll) 98 115   3/10  77 157(fish steak deep fried) 79 200(KFC/rice)   3/11  80 114 109 111   3/12  81 91 99 139   3/13 5 92 87+2  117   3/14  76 126 84+2 111   3/15  79 82+2 90          Assessment:  Michelle is working hard to maintain normal blood sugars and is concerned when she has elevations. We discussed the importance of having less CHO for the am meal and avoiding cereal, avoiding sugary beverages and smaller portions. I also encouraged her to be walking after meals 10-15 minutes unless her OB has advised her not to walk for exercise.       Ketones: checking weekly, 0 and 5.   Fasting blood glucoses: 100% in target.  After breakfast: 71% in target..  After lunch: 93% in target.  After dinner: 92% in target.    Plan/Response:    Check ketones weekly    Test blood sugar four times  daily: fasting and one-two hours after meals.    Meal plan:   1. Avoid all  sweet drinks. Limit sweets to no more than once per week.  2. Limit rice and noodle portions to no more than one small bowl per meal, have a smaller portion for your breakfast meal.  Increase  portions of meat and vegetables at meals.   3. Avoid having cold cereal in the morning.  4. Snack ideas: milk, fruit, pork.        If your doctor allows you to be active, add in 10-15 minutes of walking after meals.      Follow up with educator on 3/24/23    Time Spent: 26 minutes    Any diabetes medication dose changes were made via the CDE Protocol and Collaborative Practice Agreement with the patient's OB/GYN provider. A copy of this encounter was shared with the provider.

## 2023-03-15 NOTE — LETTER
3/15/2023         RE: Michelle Ramirez  [address]  [facility] [address]        Dear Colleague,    Thank you for referring your patient, Michelle Ramirez, to the [facility]. Please see a copy of my visit note below.    Type of Service: Telephone Visit/ with professional interpreters # [id number] and Won Gold    Originating Location (Patient Location): Home  Distant Location (Provider Location): [facility]  Mode of Communication:  Telephone    Telephone Visit Start Time: 4p  Telephone Visit End Time (telephone visit stop time): 4:26    How would patient like to obtain AVS? MyChart    Diabetes and Pregnancy Follow-up  Type of Service: Telephone Visit    How would patient like to obtain AVS? Not needed    Subjective/Objective:    Michelle Ramirez was called for a scheduled BG review. Last date of communication was: 3/1/23.    Gestational diabetes is being managed with diet and activity    Taking diabetes medications: no    Estimated Date of Delivery: Jul 18, 2023   Next OB visit: 3/24/23    Blood Glucose/Ketone Log:    Date Ketones Fasting Post Breakfast Post Lunch Post Supper   3/2  76 93 112 120   3/3  75 173(cereal/milk) 79 114   3/4  84 223(drank more juice and ate more food because she was constipated) 63+2 87   3/5 0 81 98 119 129   3/6  82 129 154(doughnut 76   3/7  83 104 88 132   3/8  86 118 107 125   3/9  86 164(steamed rice roll) 98 115   3/10  77 157(fish steak deep fried) 79 200(KFC/rice)   3/11  80 114 109 111   3/12  81 91 99 139   3/13 5 92 87+2  117   3/14  76 126 84+2 111   3/15  79 82+2 90          Assessment:  Michelle is working hard to maintain normal blood sugars and is concerned when she has elevations. We discussed the importance of having less CHO for the am meal and avoiding cereal, avoiding sugary beverages and smaller portions. I also encouraged her to be walking after meals 10-15 minutes unless her OB has advised her not to walk for exercise.        Ketones: checking weekly, 0 and 5.   Fasting blood glucoses: 100% in target.  After breakfast: 71% in target..  After lunch: 93% in target.  After dinner: 92% in target.    Plan/Response:    Check ketones weekly    Test blood sugar four times daily: fasting and one-two hours after meals.    Meal plan:   1. Avoid all  sweet drinks. Limit sweets to no more than once per week.  2. Limit rice and noodle portions to no more than one small bowl per meal, have a smaller portion for your breakfast meal.  Increase  portions of meat and vegetables at meals.   3. Avoid having cold cereal in the morning.  4. Snack ideas: milk, fruit, pork.        If your doctor allows you to be active, add in 10-15 minutes of walking after meals.      Follow up with educator on 3/24/23    Time Spent: 26 minutes    Any diabetes medication dose changes were made via the CDE Protocol and Collaborative Practice Agreement with the patient's OB/GYN provider. A copy of this encounter was shared with the provider.

## 2023-03-15 NOTE — PATIENT INSTRUCTIONS
Check ketones weekly    Test blood sugar four times daily: fasting and one-two hours after meals.    Meal plan:   Avoid all  sweet drinks. Limit sweets to no more than once per week.  Limit rice and noodle portions to no more than one small bowl per meal, have a smaller portion for your breakfast meal.  Increase  portions of meat and vegetables at meals.   Avoid having cold cereal in the morning.  Snack ideas: milk, fruit, pork.        If your doctor allows you to be active, add in 10-15 minutes of walking after meals.      Follow up with educator on 3/24/23

## 2023-03-24 ENCOUNTER — MEDICAL CORRESPONDENCE (OUTPATIENT)
Dept: HEALTH INFORMATION MANAGEMENT | Facility: CLINIC | Age: 33
End: 2023-03-24

## 2023-03-24 ENCOUNTER — TRANSFERRED RECORDS (OUTPATIENT)
Dept: HEALTH INFORMATION MANAGEMENT | Facility: CLINIC | Age: 33
End: 2023-03-24

## 2023-03-24 ENCOUNTER — TRANSCRIBE ORDERS (OUTPATIENT)
Dept: MATERNAL FETAL MEDICINE | Facility: HOSPITAL | Age: 33
End: 2023-03-24

## 2023-03-24 ENCOUNTER — TELEPHONE (OUTPATIENT)
Dept: EDUCATION SERVICES | Facility: CLINIC | Age: 33
End: 2023-03-24

## 2023-03-24 DIAGNOSIS — O26.90 PREGNANCY RELATED CONDITION, ANTEPARTUM: Primary | ICD-10-CM

## 2023-03-24 NOTE — TELEPHONE ENCOUNTER
Please reach out to Michelle to rescheduled her GDM visit that she missed today at 4pm. I tried x 2 at her preferred number and left messages, and also called her spouse's number and lvm.  It is okay for you to schedule her for a 4pm on my schedule if I am open, except for 3/31.     Thank you,    Radha

## 2023-03-27 ENCOUNTER — TRANSCRIBE ORDERS (OUTPATIENT)
Dept: MATERNAL FETAL MEDICINE | Facility: HOSPITAL | Age: 33
End: 2023-03-27
Payer: COMMERCIAL

## 2023-03-27 DIAGNOSIS — O26.90 PREGNANCY RELATED CONDITION, ANTEPARTUM: Primary | ICD-10-CM

## 2023-03-29 ENCOUNTER — TELEPHONE (OUTPATIENT)
Dept: EDUCATION SERVICES | Facility: CLINIC | Age: 33
End: 2023-03-29

## 2023-03-29 NOTE — TELEPHONE ENCOUNTER
I reached out to Michelle several times this afternoon for our 4pm GDM follow up visit. The  was finally able to leave a voice message at the preferred number and spouse's phone number.  When dialing it seemed that someone answered but we could not hear anything.  I had this issue last week also, so I am wondering if something is wrong with the patient's phone?     Please reach out to reschedule her GDM appointment.  The best time to reach her is 4pm or after when she is home from work.     Thank you,    aRdha OROZCO

## 2023-04-05 ENCOUNTER — VIRTUAL VISIT (OUTPATIENT)
Dept: EDUCATION SERVICES | Facility: CLINIC | Age: 33
End: 2023-04-05
Payer: COMMERCIAL

## 2023-04-05 DIAGNOSIS — O24.410 DIET CONTROLLED GESTATIONAL DIABETES MELLITUS (GDM), ANTEPARTUM: Primary | ICD-10-CM

## 2023-04-05 PROCEDURE — 98968 PH1 ASSMT&MGMT NQHP 21-30: CPT | Mod: 95 | Performed by: DIETITIAN, REGISTERED

## 2023-04-05 NOTE — PROGRESS NOTES
Type of Service: Telephone Visit  28 minutes/  With professional  Won Gold.     Originating Location (Patient Location): Home  Distant Location (Provider Location): [facility]  Mode of Communication:  Telephone    Telephone Visit Start Time: 3: 58  Telephone Visit End Time (telephone visit stop time): 4:26    How would patient like to obtain AVS? MyChart      Diabetes and Pregnancy Follow-up  Type of Service: Telephone Visit    How would patient like to obtain AVS? Not needed    Subjective/Objective:    Michelle Ramirez was called for a scheduled BG review. Last date of communication was: 3/15/23.    Gestational diabetes is being managed with diet and activity    Taking diabetes medications: no    Estimated Date of Delivery: Jul 18, 2023   Next OB visit: 4/21/23    Blood Glucose/Ketone Log:    Date Ketones Fasting Post Breakfast Post Lunch Post Supper   3/16  82 106+2 97 113   3/17  75 104 138 103   3/18  84 119 132 86+2   3/19 0 84 111 104+2 86+2   3/20  79 145(stir-dubose, rice) 73 100   3/21  80 84+2 88+2 92+2   3/22  82 110+2 103 109   3/23  77 98 120 99+2   3/24  74 -- 91+2 140   3/25  85 161(doughnut/  Juice) 142 --   3/26  86 94 137 103+2   3/27 0 79 112 130 178(stirfry/rice,larger portion)   3/28  77 80+2 92+2 108   3/29  78 122 122 110   3/30  90 131 110 115+2   3/31 0 82 127 114+2 100   4/1  83 -- 139 172(out to eat, sweet pork)   4/2  85 107 -- 85+2   4/3  87 94 138 108+2   4/4  84 87+2 104 134   4/5  80 108 98          Assessment:  Michelle reported that she did not receive any missed calls or voice messages from our previous attempts to reach her on 3/24 and 3/29. Michelle continues to limit portions and avoid sugary beverages. She did have a few elevated PP due to food choices. Michelle had no further questions or concerns today.        Ketones: negative.   Fasting blood glucose: 100% in target.  After breakfast: 89% in target.  After lunch: 95% in target.  After dinner: 89% in  target.    Plan/Response:  Check ketones weekly     Test blood sugar four times daily: fasting and one-two hours after meals.     Meal plan:   1. Avoid all  sweet drinks. Limit sweets to no more than once per week.  2. Limit rice and noodle portions to no more than one small bowl per meal, have a smaller portion for your breakfast meal.  Increase  portions of meat and vegetables at meals.   3. Avoid having cold cereal, fruit and fruit juice for the morning meal.  4. Snack ideas: milk, fruit, pork.          If your doctor allows you to be active, add in 10-15 minutes of walking after meals.      Follow up with educator on 4/19/23      Time Spent: 28 minutes    Any diabetes medication dose changes were made via the CDE Protocol and Collaborative Practice Agreement with the patient's OB/GYN provider. A copy of this encounter was shared with the provider.

## 2023-04-05 NOTE — LETTER
4/5/2023         RE: Michelle Ramirez  [address]  [facility] [address]        Dear Colleague,    Thank you for referring your patient, Michelle Ramirez, to the [facility]. Please see a copy of my visit note below.    Type of Service: Telephone Visit  28 minutes/  With professional  Won Gold.     Originating Location (Patient Location): Home  Distant Location (Provider Location): [facility]  Mode of Communication:  Telephone    Telephone Visit Start Time: 3: 58  Telephone Visit End Time (telephone visit stop time): 4:26    How would patient like to obtain AVS? MyChart      Diabetes and Pregnancy Follow-up  Type of Service: Telephone Visit    How would patient like to obtain AVS? Not needed    Subjective/Objective:    Michelle Ramirez was called for a scheduled BG review. Last date of communication was: 3/15/23.    Gestational diabetes is being managed with diet and activity    Taking diabetes medications: no    Estimated Date of Delivery: Jul 18, 2023   Next OB visit: 4/21/23    Blood Glucose/Ketone Log:    Date Ketones Fasting Post Breakfast Post Lunch Post Supper   3/16  82 106+2 97 113   3/17  75 104 138 103   3/18  84 119 132 86+2   3/19 0 84 111 104+2 86+2   3/20  79 145(stir-dubose, rice) 73 100   3/21  80 84+2 88+2 92+2   3/22  82 110+2 103 109   3/23  77 98 120 99+2   3/24  74 -- 91+2 140   3/25  85 161(doughnut/  Juice) 142 --   3/26  86 94 137 103+2   3/27 0 79 112 130 178(stirfry/rice,larger portion)   3/28  77 80+2 92+2 108   3/29  78 122 122 110   3/30  90 131 110 115+2   3/31 0 82 127 114+2 100   4/1  83 -- 139 172(out to eat, sweet pork)   4/2  85 107 -- 85+2   4/3  87 94 138 108+2   4/4  84 87+2 104 134   4/5  80 108 98          Assessment:  Michelle reported that she did not receive any missed calls or voice messages from our previous attempts to reach her on 3/24 and 3/29. Michelle continues to limit portions and avoid sugary beverages. She did have a  few elevated PP due to food choices. Michelle had no further questions or concerns today.        Ketones: negative.   Fasting blood glucose: 100% in target.  After breakfast: 89% in target.  After lunch: 95% in target.  After dinner: 89% in target.    Plan/Response:  Check ketones weekly     Test blood sugar four times daily: fasting and one-two hours after meals.     Meal plan:   1. Avoid all  sweet drinks. Limit sweets to no more than once per week.  2. Limit rice and noodle portions to no more than one small bowl per meal, have a smaller portion for your breakfast meal.  Increase  portions of meat and vegetables at meals.   3. Avoid having cold cereal, fruit and fruit juice for the morning meal.  4. Snack ideas: milk, fruit, pork.          If your doctor allows you to be active, add in 10-15 minutes of walking after meals.      Follow up with educator on 4/19/23      Time Spent: 28 minutes    Any diabetes medication dose changes were made via the CDE Protocol and Collaborative Practice Agreement with the patient's OB/GYN provider. A copy of this encounter was shared with the provider.

## 2023-04-05 NOTE — PATIENT INSTRUCTIONS
Check ketones weekly     Test blood sugar four times daily: fasting and one-two hours after meals.     Meal plan:   Avoid all  sweet drinks. Limit sweets to no more than once per week.  Limit rice and noodle portions to no more than one small bowl per meal, have a smaller portion for your breakfast meal.  Increase  portions of meat and vegetables at meals.   Avoid having cold cereal in the morning.  Snack ideas: milk, fruit, pork.          If your doctor allows you to be active, add in 10-15 minutes of walking after meals.      Follow up with educator on 4/19/23

## 2023-04-19 ENCOUNTER — TELEPHONE (OUTPATIENT)
Dept: EDUCATION SERVICES | Facility: CLINIC | Age: 33
End: 2023-04-19

## 2023-04-19 NOTE — TELEPHONE ENCOUNTER
Please reach out to Michelle to reschedule her GDM visit that she missed today.  I called x 2, 10 minutes apart and left voice messages on her preferred # as well has her spouses #.     Thank you,  Radha Tolentino

## 2023-04-21 ENCOUNTER — LAB REQUISITION (OUTPATIENT)
Dept: LAB | Facility: CLINIC | Age: 33
End: 2023-04-21

## 2023-04-21 ENCOUNTER — VIRTUAL VISIT (OUTPATIENT)
Dept: EDUCATION SERVICES | Facility: CLINIC | Age: 33
End: 2023-04-21
Payer: COMMERCIAL

## 2023-04-21 ENCOUNTER — PRE VISIT (OUTPATIENT)
Dept: MATERNAL FETAL MEDICINE | Facility: HOSPITAL | Age: 33
End: 2023-04-21
Payer: COMMERCIAL

## 2023-04-21 DIAGNOSIS — O24.410 DIET CONTROLLED GESTATIONAL DIABETES MELLITUS (GDM), ANTEPARTUM: Primary | ICD-10-CM

## 2023-04-21 DIAGNOSIS — Z11.3 ENCOUNTER FOR SCREENING FOR INFECTIONS WITH A PREDOMINANTLY SEXUAL MODE OF TRANSMISSION: ICD-10-CM

## 2023-04-21 PROCEDURE — 98967 PH1 ASSMT&MGMT NQHP 11-20: CPT | Mod: 95 | Performed by: DIETITIAN, REGISTERED

## 2023-04-21 PROCEDURE — 86780 TREPONEMA PALLIDUM: CPT | Performed by: NURSE PRACTITIONER

## 2023-04-21 NOTE — PATIENT INSTRUCTIONS
Check ketones weekly     Test blood sugar four times daily: fasting and one or two hours after meals.     Meal plan:   Avoid all  sweet drinks. Limit sweets to no more than once per week.  Limit rice and noodle portions to no more than one small bowl per meal, have a smaller portion for your breakfast meal.  Increase  portions of meat and vegetables at meals.   Avoid having cold cereal in the morning.  Snack ideas: milk, fruit, pork.          If your doctor allows you to be active, add in 10-15 minutes of walking after meals.      Follow up with educator on 5/2/23

## 2023-04-21 NOTE — LETTER
4/21/2023         RE: Michelle Ramirez  [address]  [facility] [address]        Dear Colleague,    Thank you for referring your patient, Michelle Ramirez, to the [facility]. Please see a copy of my visit note below.    Type of Service: Telephone Visit/ 18 minutes    Originating Location (Patient Location): Home  Distant Location (Provider Location): [facility]  Mode of Communication:  Telephone    Telephone Visit Start Time: 2:05  Telephone Visit End Time (telephone visit stop time): 2:33    How would patient like to obtain AVS? MyChart     Diabetes and Pregnancy Follow-up  Type of Service: Telephone Visit    How would patient like to obtain AVS? Not needed    Subjective/Objective:     Michelle Ramirez was called for a scheduled BG review. Last date of communication was: 4/5/23.    Gestational diabetes is being managed with diet and activity    Taking diabetes medications: no    Estimated Date of Delivery: Jul 18, 2023   Next OB visit: 5/2/23    Blood Glucose/Ketone Log:    Date Ketones Fasting Post Breakfast Post Lunch Post Supper   4/6  82 124 102+2 163(sweet meatball sauce)   4/7  80 144(xtra rice)  104   4/8 5 79 98 119+2 108   4/9  No strips today      4/10  81 113+2 68+2 124   4/11  86 114 126 109+2   4/12  89 118 77+2 170( 2 cookies with dinner)   4/13  84 126 109 95+2   4/14  82 96+2 87+2 109+2   4/15  77 94 130 102   4/16  82 108  92+2   4/17 15 81 145(hashbrowns) 177(fruit x2, chips) 131   4/18  85 90+2 89+2 111+2   4/19  84 73+2 93+2 141   4/20  83 69+2 86+2 113   4/21  80            Assessment:  No questions or concerns from Michelle today.  She is active with walking at her job and she is adding walking as tolerated when she gets home.  Michelle continues limit portions and avoid sugary beverages. Her elevated PP were explainable with food choices and/or portions.      Ketones: 5/15  Fasting blood glucoses: 100% in target.  After breakfast: 86% in target.  After  lunch: 92% in target.  After dinner: 79% in target.    Plan/Response:  Check ketones weekly     Test blood sugar four times daily: fasting and one or two hours after meals.     Meal plan:   1. Avoid all  sweet drinks. Limit sweets to no more than once per week.  2. Limit rice and noodle portions to no more than one small bowl per meal, have a smaller portion for your breakfast meal.  Increase  portions of meat and vegetables at meals.   3. Avoid having cold cereal in the morning.  4. Snack ideas: milk, fruit, pork.          If your doctor allows you to be active, add in 10-15 minutes of walking after meals.      Follow up with educator on 5/2/23      Time Spent: 18 minutes    Any diabetes medication dose changes were made via the CDE Protocol and Collaborative Practice Agreement with the patient's OB/GYN provider. A copy of this encounter was shared with the provider.

## 2023-04-21 NOTE — PROGRESS NOTES
Type of Service: Telephone Visit/ 18 minutes    Originating Location (Patient Location): Home  Distant Location (Provider Location): [facility]  Mode of Communication:  Telephone    Telephone Visit Start Time: 2:05  Telephone Visit End Time (telephone visit stop time): 2:33    How would patient like to obtain AVS? [place]     Diabetes and Pregnancy Follow-up  Type of Service: Telephone Visit    How would patient like to obtain AVS? Not needed    Subjective/Objective:     Michelle Ramirez was called for a scheduled BG review. Last date of communication was: 4/5/23.    Gestational diabetes is being managed with diet and activity    Taking diabetes medications: no    Estimated Date of Delivery: Jul 18, 2023   Next OB visit: 5/2/23    Blood Glucose/Ketone Log:    Date Ketones Fasting Post Breakfast Post Lunch Post Supper   4/6  82 124 102+2 163(sweet meatball sauce)   4/7  80 144(xtra rice)  104   4/8 5 79 98 119+2 108   4/9  No strips today      4/10  81 113+2 68+2 124   4/11  86 114 126 109+2   4/12  89 118 77+2 170( 2 cookies with dinner)   4/13  84 126 109 95+2   4/14  82 96+2 87+2 109+2   4/15  77 94 130 102   4/16  82 108  92+2   4/17 15 81 145(hashbrowns) 177(fruit x2, chips) 131   4/18  85 90+2 89+2 111+2   4/19  84 73+2 93+2 141   4/20  83 69+2 86+2 113   4/21  80            Assessment:  No questions or concerns from Michelle today.  She is active with walking at her job and she is adding walking as tolerated when she gets home.  Michelle continues limit portions and avoid sugary beverages. Her elevated PP were explainable with food choices and/or portions.      Ketones: 5/15  Fasting blood glucoses: 100% in target.  After breakfast: 86% in target.  After lunch: 92% in target.  After dinner: 79% in target.    Plan/Response:  Check ketones weekly     Test blood sugar four times daily: fasting and one or two hours after meals.     Meal plan:   1. Avoid all  sweet drinks. Limit sweets to no more than  once per week.  2. Limit rice and noodle portions to no more than one small bowl per meal, have a smaller portion for your breakfast meal.  Increase  portions of meat and vegetables at meals.   3. Avoid having cold cereal in the morning.  4. Snack ideas: milk, fruit, pork.          If your doctor allows you to be active, add in 10-15 minutes of walking after meals.      Follow up with educator on 5/2/23      Time Spent: 18 minutes    Any diabetes medication dose changes were made via the CDE Protocol and Collaborative Practice Agreement with the patient's OB/GYN provider. A copy of this encounter was shared with the provider.

## 2023-04-22 LAB — T PALLIDUM AB SER QL: NONREACTIVE

## 2023-04-27 ENCOUNTER — ANCILLARY PROCEDURE (OUTPATIENT)
Dept: ULTRASOUND IMAGING | Facility: HOSPITAL | Age: 33
End: 2023-04-27
Attending: OBSTETRICS & GYNECOLOGY
Payer: COMMERCIAL

## 2023-04-27 ENCOUNTER — OFFICE VISIT (OUTPATIENT)
Dept: MATERNAL FETAL MEDICINE | Facility: HOSPITAL | Age: 33
End: 2023-04-27
Attending: OBSTETRICS & GYNECOLOGY
Payer: COMMERCIAL

## 2023-04-27 DIAGNOSIS — O24.419 GESTATIONAL DIABETES MELLITUS (GDM) AFFECTING THIRD PREGNANCY: Primary | ICD-10-CM

## 2023-04-27 DIAGNOSIS — O26.90 PREGNANCY RELATED CONDITION, ANTEPARTUM: ICD-10-CM

## 2023-04-27 PROCEDURE — 76811 OB US DETAILED SNGL FETUS: CPT

## 2023-04-27 PROCEDURE — 76811 OB US DETAILED SNGL FETUS: CPT | Mod: 26 | Performed by: OBSTETRICS & GYNECOLOGY

## 2023-04-27 PROCEDURE — 99207 PR NO CHARGE LOS: CPT | Performed by: OBSTETRICS & GYNECOLOGY

## 2023-04-27 NOTE — NURSING NOTE
Patient reports positive fetal movement, denies pain, denie contractions, leaking of fluid, or bleeding.  Reports blood sugar values have been in range. Patient denies headache, visual changes, nausea/vomiting, epigastric pain related to preeclampsia. Onehub ipad  used for entire MFM visit, ID# [id number]. SBAR given to MFM MD, see their note in Epic.

## 2023-04-27 NOTE — PROGRESS NOTES
Please see the imaging tab for details of the ultrasound performed today.    Jailene Izaguirre MD  Specialist in Maternal-Fetal Medicine

## 2023-05-02 ENCOUNTER — VIRTUAL VISIT (OUTPATIENT)
Dept: EDUCATION SERVICES | Facility: CLINIC | Age: 33
End: 2023-05-02
Payer: COMMERCIAL

## 2023-05-02 DIAGNOSIS — O24.410 DIET CONTROLLED GESTATIONAL DIABETES MELLITUS (GDM), ANTEPARTUM: Primary | ICD-10-CM

## 2023-05-02 PROCEDURE — G0108 DIAB MANAGE TRN  PER INDIV: HCPCS | Mod: 95 | Performed by: DIETITIAN, REGISTERED

## 2023-05-02 NOTE — PATIENT INSTRUCTIONS
Check ketones weekly     Test blood sugar four times daily: fasting and one or two hours after meals.     Meal plan:   Avoid all  sweet drinks. Limit sweets to no more than once per week.  Limit rice and noodle portions to no more than one small bowl per meal, have a smaller portion for your breakfast meal.  Increase  portions of meat and vegetables at meals.   Avoid having cold cereal in the morning.  Snack ideas: milk, fruit, pork.          If your doctor allows you to be active, add in 10-15 minutes of walking after meals.      Follow up with educator on 5/17/23

## 2023-05-02 NOTE — PROGRESS NOTES
Type of Service: Telephone Visit  Through professional  # [id number]    Originating Location (Patient Location): Home  Distant Location (Provider Location): [facility]  Mode of Communication:  Telephone    Telephone Visit Start Time: 1:26  Telephone Visit End Time (telephone visit stop time): 1:54    How would patient like to obtain AVS? [place]      Diabetes and Pregnancy Follow-up  Type of Service: Telephone Visit    How would patient like to obtain AVS? Not needed    Subjective/Objective:    Michelle Ramirez was called for a scheduled BG review. Last date of communication was: 4/21/23.    Gestational diabetes is being managed with diet and activity    Taking diabetes medications: no    Estimated Date of Delivery: Jul 18, 2023  Next OB visit: May 16th, 2023    Blood Glucose/Ketone Log:    Date Ketones Fasting Post Breakfast Post Lunch Post Supper   4/22  84 93+2  113   4/23 5 81  114+2 132   4/24  86 175(2 brd/milk) 88+2 124   4/25  78 88+2 96+2 127   4/26  85 86 89+2 148(york noodle soup)   4/27  86  133 91+2   4/28 5 88 87+2 127 152(ate out)   4/29  90  103+2 115+2   4/30  87  225(v.hungry/overate) 83   5/1  82 124 97 130   5/2  85 93           Assessment:  No questions or concerns from Michelle today. FBS at goal, PP elevations explainable with food choices/portions. Michelle continues to be active with her job and added walking at home when able. She is working on limiting portions and avoiding sugary beverages.       Ketones: trace.   Fasting blood glucoses: 100% in target.  After breakfast: 86% in target.  After lunch: 89% in target.  After dinner: 80% in target.    Plan/Response:  Check ketones weekly     Test blood sugar four times daily: fasting and one or two hours after meals.     Meal plan:   1. Avoid all  sweet drinks. Limit sweets to no more than once per week.  2. Limit rice and noodle portions to no more than one small bowl per meal, have a smaller portion for your breakfast  meal.  Increase  portions of meat and vegetables at meals.   3. Avoid having cold cereal in the morning.  4. Snack ideas: milk, fruit, pork.          If your doctor allows you to be active, add in 10-15 minutes of walking after meals.      Follow up with educator on 5/17/23      Time Spent: 28 minutes    Any diabetes medication dose changes were made via the CDE Protocol and Collaborative Practice Agreement with the patient's OB/GYN provider. A copy of this encounter was shared with the provider.

## 2023-05-02 NOTE — LETTER
5/2/2023         RE: Michelle Ramirez  [address]  [facility] [address]        Dear Colleague,    Thank you for referring your patient, Michelle Ramirez, to the [facility]. Please see a copy of my visit note below.    Type of Service: Telephone Visit  Through professional  # [id number]    Originating Location (Patient Location): Home  Distant Location (Provider Location): [facility]  Mode of Communication:  Telephone    Telephone Visit Start Time: 1:26  Telephone Visit End Time (telephone visit stop time): 1:54    How would patient like to obtain AVS? MyChart      Diabetes and Pregnancy Follow-up  Type of Service: Telephone Visit    How would patient like to obtain AVS? Not needed    Subjective/Objective:    Michelle Ramirez was called for a scheduled BG review. Last date of communication was: 4/21/23.    Gestational diabetes is being managed with diet and activity    Taking diabetes medications: no    Estimated Date of Delivery: Jul 18, 2023  Next OB visit: May 16th, 2023    Blood Glucose/Ketone Log:    Date Ketones Fasting Post Breakfast Post Lunch Post Supper   4/22  84 93+2  113   4/23 5 81  114+2 132   4/24  86 175(2 brd/milk) 88+2 124   4/25  78 88+2 96+2 127   4/26  85 86 89+2 148(york noodle soup)   4/27  86  133 91+2   4/28 5 88 87+2 127 152(ate out)   4/29  90  103+2 115+2   4/30  87  225(v.hungry/overate) 83   5/1  82 124 97 130   5/2  85 93           Assessment:  No questions or concerns from Michelle today. FBS at goal, PP elevations explainable with food choices/portions. Michelle continues to be active with her job and added walking at home when able. She is working on limiting portions and avoiding sugary beverages.       Ketones: trace.   Fasting blood glucoses: 100% in target.  After breakfast: 86% in target.  After lunch: 89% in target.  After dinner: 80% in target.    Plan/Response:  Check ketones weekly     Test blood sugar four times daily: fasting  and one or two hours after meals.     Meal plan:   1. Avoid all  sweet drinks. Limit sweets to no more than once per week.  2. Limit rice and noodle portions to no more than one small bowl per meal, have a smaller portion for your breakfast meal.  Increase  portions of meat and vegetables at meals.   3. Avoid having cold cereal in the morning.  4. Snack ideas: milk, fruit, pork.          If your doctor allows you to be active, add in 10-15 minutes of walking after meals.      Follow up with educator on 5/17/23      Time Spent: 28 minutes    Any diabetes medication dose changes were made via the CDE Protocol and Collaborative Practice Agreement with the patient's OB/GYN provider. A copy of this encounter was shared with the provider.

## 2023-05-09 ENCOUNTER — HOSPITAL ENCOUNTER (OUTPATIENT)
Dept: CARDIOLOGY | Facility: CLINIC | Age: 33
Discharge: HOME | End: 2023-05-09
Attending: OBSTETRICS & GYNECOLOGY | Admitting: OBSTETRICS & GYNECOLOGY
Payer: COMMERCIAL

## 2023-05-09 DIAGNOSIS — O26.90 PREGNANCY RELATED CONDITION, ANTEPARTUM: ICD-10-CM

## 2023-05-09 PROCEDURE — 76825 ECHO EXAM OF FETAL HEART: CPT | Mod: 26 | Performed by: PEDIATRICS

## 2023-05-09 PROCEDURE — 93325 DOPPLER ECHO COLOR FLOW MAPG: CPT | Mod: 26 | Performed by: PEDIATRICS

## 2023-05-09 PROCEDURE — 93325 DOPPLER ECHO COLOR FLOW MAPG: CPT

## 2023-05-09 PROCEDURE — 76827 ECHO EXAM OF FETAL HEART: CPT | Mod: 26 | Performed by: PEDIATRICS

## 2023-05-09 PROCEDURE — 76825 ECHO EXAM OF FETAL HEART: CPT

## 2023-05-17 ENCOUNTER — VIRTUAL VISIT (OUTPATIENT)
Dept: EDUCATION SERVICES | Facility: CLINIC | Age: 33
End: 2023-05-17
Payer: COMMERCIAL

## 2023-05-17 DIAGNOSIS — O24.410 DIET CONTROLLED GESTATIONAL DIABETES MELLITUS (GDM), ANTEPARTUM: Primary | ICD-10-CM

## 2023-05-17 PROCEDURE — G0108 DIAB MANAGE TRN  PER INDIV: HCPCS | Mod: 95 | Performed by: DIETITIAN, REGISTERED

## 2023-05-17 NOTE — PROGRESS NOTES
Type of Service: Telephone Visit/37 minutes   With professional  # [id number]    Originating Location (Patient Location): Home  Distant Location (Provider Location): [facility]  Mode of Communication:  Telephone    Telephone Visit Start Time: 3:56  Telephone Visit End Time (telephone visit stop time): 4:34    How would patient like to obtain AVS? MyChart      Diabetes and Pregnancy Follow-up  Type of Service: Telephone Visit    How would patient like to obtain AVS? Not needed    Subjective/Objective:    Michelle Ramirez was called for a scheduled BG review. Last date of communication was: 5/2/23.    Gestational diabetes is being managed with diet and activity    Taking diabetes medications: no    Estimated Date of Delivery: Jul 18, 2023   Next OB visit: May 19th    Blood Glucose/Ketone Log:    Date Ketones Fasting Post Breakfast Post Lunch Post Supper   5/3     129   5/4  80 98+2 98+2 162(mcdonaldsfries/burger)   5/5 15 79 79+2 87+2 --   5/6  85 115+2 136 134   5/7  88 172(instant noodles) -- 96   5/8  80 137 -- 96   5/9  79 -- 122 93+2   5/10  -- 87 177(pork/rice, soup/cookie) 129   5/11  83 96 89+2 --   5/12 5 89 103 99 101   5/13  78 -- -- 177( mothers day, ate more than normal)   5/14  81 122 80+2 98+2   5/15  89 104 149(cinnamon roll) 109   5/16  84 104 125 194(papaya salad/cookie)   5/17  81 82+2 95+2      Assessment:  FBS are meeting glycemic goals. We reviewed elevated PP which were explainable, but we discussed the importance of having less elevated PP readings, limiting portions of CHO containing foods, not having sweets with a meal, adding more vegetables and protein, not skipping meals and  adding 10-15 minutes of walking after meals to help with PP elevations.           Am: most mornings, she has rice/meat/vegetables  Ketones: 15, 5.   Fasting blood glucoses: 100% in target.  After breakfast: 92% in target.  After lunch: 82% in target..  After dinner: 75% in  target.    Plan/Response:  Meal Plan Recommendation: 30 carbs at breakfast, 45 carbs at lunch, 45 carbs at supper, 15-30 carbs at each of 3 snacks between meals  1. Limit sweets to no more than once per week.  2. Limit rice and noodle portions to no more than one small bowl per meal, have a smaller portion for your breakfast meal.  Increase  portions of meat and vegetables at meals  3. Do not have sweets with a meal, save for a snack or have a fruit serving or protein.   4. Try not to skip meals, to avoid overeating at the next meal.   Exercise / activity plan: walk for 10-15 minutes after meals  Continue to check BG 4 times daily (fasting and one to two hour(s) after each meal).  Reduce ketone checks to once a week.  Follow-up in 2 weeks: 5/31/23.      Time Spent: 37 minutes    Any diabetes medication dose changes were made via the CDE Protocol and Collaborative Practice Agreement with the patient's OB/GYN provider. A copy of this encounter was shared with the provider.

## 2023-05-17 NOTE — LETTER
5/17/2023         RE: Michelle Ramirez  [address]  [facility] [address]        Dear Colleague,    Thank you for referring your patient, Michelle Ramirez, to the [facility]. Please see a copy of my visit note below.    Type of Service: Telephone Visit/37 minutes   With professional  # [id number] Swanson    Originating Location (Patient Location): Home  Distant Location (Provider Location): [facility]  Mode of Communication:  Telephone    Telephone Visit Start Time: 3:56  Telephone Visit End Time (telephone visit stop time): 4:34    How would patient like to obtain AVS? MyChart      Diabetes and Pregnancy Follow-up  Type of Service: Telephone Visit    How would patient like to obtain AVS? Not needed    Subjective/Objective:    Michelle Ramirez was called for a scheduled BG review. Last date of communication was: 5/2/23.    Gestational diabetes is being managed with diet and activity    Taking diabetes medications: no    Estimated Date of Delivery: Jul 18, 2023   Next OB visit: May 19th    Blood Glucose/Ketone Log:    Date Ketones Fasting Post Breakfast Post Lunch Post Supper   5/3     129   5/4  80 98+2 98+2 162(mcdonaldsfries/burger)   5/5 15 79 79+2 87+2 --   5/6  85 115+2 136 134   5/7  88 172(instant noodles) -- 96   5/8  80 137 -- 96   5/9  79 -- 122 93+2   5/10  -- 87 177(pork/rice, soup/cookie) 129   5/11  83 96 89+2 --   5/12 5 89 103 99 101   5/13  78 -- -- 177( mothers day, ate more than normal)   5/14  81 122 80+2 98+2   5/15  89 104 149(cinnamon roll) 109   5/16  84 104 125 194(papaya salad/cookie)   5/17  81 82+2 95+2      Assessment:  FBS are meeting glycemic goals. We reviewed elevated PP which were explainable, but we discussed the importance of having less elevated PP readings, limiting portions of CHO containing foods, not having sweets with a meal, adding more vegetables and protein, not skipping meals and  adding 10-15 minutes of walking after  meals to help with PP elevations.           Am: most mornings, she has rice/meat/vegetables  Ketones: 15, 5.   Fasting blood glucoses: 100% in target.  After breakfast: 92% in target.  After lunch: 82% in target..  After dinner: 75% in target.    Plan/Response:  Meal Plan Recommendation: 30 carbs at breakfast, 45 carbs at lunch, 45 carbs at supper, 15-30 carbs at each of 3 snacks between meals  1. Limit sweets to no more than once per week.  2. Limit rice and noodle portions to no more than one small bowl per meal, have a smaller portion for your breakfast meal.  Increase  portions of meat and vegetables at meals  3. Do not have sweets with a meal, save for a snack or have a fruit serving or protein.   4. Try not to skip meals, to avoid overeating at the next meal.   Exercise / activity plan: walk for 10-15 minutes after meals  Continue to check BG 4 times daily (fasting and one to two hour(s) after each meal).  Reduce ketone checks to once a week.  Follow-up in 2 weeks: 5/31/23.      Time Spent: 37 minutes    Any diabetes medication dose changes were made via the CDE Protocol and Collaborative Practice Agreement with the patient's OB/GYN provider. A copy of this encounter was shared with the provider.

## 2023-05-31 ENCOUNTER — VIRTUAL VISIT (OUTPATIENT)
Dept: EDUCATION SERVICES | Facility: CLINIC | Age: 33
End: 2023-05-31
Payer: COMMERCIAL

## 2023-05-31 DIAGNOSIS — O24.410 DIET CONTROLLED GESTATIONAL DIABETES MELLITUS (GDM), ANTEPARTUM: Primary | ICD-10-CM

## 2023-05-31 PROCEDURE — 98967 PH1 ASSMT&MGMT NQHP 11-20: CPT | Mod: 93 | Performed by: DIETITIAN, REGISTERED

## 2023-05-31 NOTE — PATIENT INSTRUCTIONS
Meal Plan Recommendation: 30 carbs at breakfast, 45 carbs at lunch, 45 carbs at supper, 15-30 carbs at each of 3 snacks between meals    Limit sweets to no more than once per week.  Limit rice and noodle portions to no more than one small bowl per meal, have a smaller portion for your breakfast meal.  Increase  portions of meat and vegetables at meals  Do not have sweets with a meal, save for a snack or have a fruit serving or protein.   Try not to skip meals, to avoid overeating at the next meal.   Avoid sticky rice  If you have kapun noodles, have only one portion.  If you have york/rice, have a smaller portion.   Avoid having fruit with meals, have as a snack instead.     Exercise / activity plan: walk for 10-15 minutes after meals    Continue to check BG 4 times daily (fasting and one to two hour(s) after each meal).    Reduce ketone checks to once a week.    Follow-up in 2 weeks: 6/14/23.

## 2023-05-31 NOTE — LETTER
5/31/2023         RE: Michelle Ramirez  [address]  [facility] [address]        Dear Colleague,    Thank you for referring your patient, Michelle Ramirez, to the [facility]. Please see a copy of my visit note below.    Type of Service: Telephone Visit    Originating Location (Patient Location): Home  Distant Location (Provider Location): [facility]  Mode of Communication:  Telephone    Telephone Visit Start Time: 4:05  Telephone Visit End Time (telephone visit stop time): 4:18    How would patient like to obtain AVS? MyChart    Diabetes and Pregnancy Follow-up  Type of Service: Telephone Visit    How would patient like to obtain AVS? Not needed    Subjective/Objective:    Michelle Ramirez was called for a scheduled BG review. Last date of communication was: 5/17/23.    Gestational diabetes is being managed with diet and activity    Taking diabetes medications: no    Estimated Date of Delivery: Jul 18, 2023   Next OB visit: 6/2/23    Blood Glucose/Ketone Log since 5/18/23:    FBS: all < 95 per patient  Post am: all < 140 or < 120, per patient except for 5/2982=784(banana/rice pudding)  Post lunch: all < 140 or < 120 per patient  Post dinner: all < 140 or < 120 per patient except for 5/(sticky rice, bigger portion), 5/2302=433(york/rice, sweetened coconut milk), 5/3072=394(two portions of Kapun)        Assessment:  Michelle is walking 30 minutes after work. She continues to have three meals/day, limited snacking as she cannot snack at work. Elevated PP explainable with food choices. We discussed smaller portions of curried rice and Kapun and avoiding sticky rice at this time. She is hydrated with water only.      Ketones: trace.   Fasting blood glucoses: 100% in target.  After breakfast: 93% in target.  After lunch: 100% in target.  After dinner: 79% in target.    Plan/Response:  Meal Plan Recommendation: 30 carbs at breakfast, 45 carbs at lunch, 45 carbs at supper, 15-30  carbs at each of 3 snacks between meals    1. Limit sweets to no more than once per week.  2. Limit rice and noodle portions to no more than one small bowl per meal, have a smaller portion for your breakfast meal.  Increase  portions of meat and vegetables at meals  3. Do not have sweets with a meal, save for a snack or have a fruit serving or protein.   4. Try not to skip meals, to avoid overeating at the next meal.   5. Avoid sticky rice  6. If you have kapun noodles, have only one portion.  7. If you have york/rice, have a smaller portion.   8. Avoid having fruit with meals, have as a snack instead.     Exercise / activity plan: walk for 10-15 minutes after meals    Continue to check BG 4 times daily (fasting and one to two hour(s) after each meal).    Reduce ketone checks to once a week.    Follow-up in 2 weeks: 6/14/23.      Time Spent: 13 minutes    Any diabetes medication dose changes were made via the CDE Protocol and Collaborative Practice Agreement with the patient's OB/GYN provider. A copy of this encounter was shared with the provider.

## 2023-05-31 NOTE — PROGRESS NOTES
Type of Service: Telephone Visit    Originating Location (Patient Location): Home  Distant Location (Provider Location): [facility]  Mode of Communication:  Telephone    Telephone Visit Start Time: 4:05  Telephone Visit End Time (telephone visit stop time): 4:18    How would patient like to obtain AVS? Celehart    Diabetes and Pregnancy Follow-up  Type of Service: Telephone Visit    How would patient like to obtain AVS? Not needed    Subjective/Objective:    Michelle Ramirez was called for a scheduled BG review. Last date of communication was: 5/17/23.    Gestational diabetes is being managed with diet and activity    Taking diabetes medications: no    Estimated Date of Delivery: Jul 18, 2023   Next OB visit: 6/2/23    Blood Glucose/Ketone Log since 5/18/23:    FBS: all < 95 per patient  Post am: all < 140 or < 120, per patient except for 5/2919=262(banana/rice pudding)  Post lunch: all < 140 or < 120 per patient  Post dinner: all < 140 or < 120 per patient except for 5/(sticky rice, bigger portion), 5/2369=449(york/rice, sweetened coconut milk), 5/3014=281(two portions of Kapun)        Assessment:  Michelle is walking 30 minutes after work. She continues to have three meals/day, limited snacking as she cannot snack at work. Elevated PP explainable with food choices. We discussed smaller portions of curried rice and Kapun and avoiding sticky rice at this time. She is hydrated with water only.      Ketones: trace.   Fasting blood glucoses: 100% in target.  After breakfast: 93% in target.  After lunch: 100% in target.  After dinner: 79% in target.    Plan/Response:  Meal Plan Recommendation: 30 carbs at breakfast, 45 carbs at lunch, 45 carbs at supper, 15-30 carbs at each of 3 snacks between meals    1. Limit sweets to no more than once per week.  2. Limit rice and noodle portions to no more than one small bowl per meal, have a smaller portion for your breakfast meal.  Increase  portions of meat and  vegetables at meals  3. Do not have sweets with a meal, save for a snack or have a fruit serving or protein.   4. Try not to skip meals, to avoid overeating at the next meal.   5. Avoid sticky rice  6. If you have kapun noodles, have only one portion.  7. If you have york/rice, have a smaller portion.   8. Avoid having fruit with meals, have as a snack instead.     Exercise / activity plan: walk for 10-15 minutes after meals    Continue to check BG 4 times daily (fasting and one to two hour(s) after each meal).    Reduce ketone checks to once a week.    Follow-up in 2 weeks: 6/14/23.      Time Spent: 13 minutes    Any diabetes medication dose changes were made via the CDE Protocol and Collaborative Practice Agreement with the patient's OB/GYN provider. A copy of this encounter was shared with the provider.

## 2023-06-14 ENCOUNTER — VIRTUAL VISIT (OUTPATIENT)
Dept: EDUCATION SERVICES | Facility: CLINIC | Age: 33
End: 2023-06-14
Payer: COMMERCIAL

## 2023-06-14 DIAGNOSIS — O24.410 DIET CONTROLLED GESTATIONAL DIABETES MELLITUS (GDM), ANTEPARTUM: Primary | ICD-10-CM

## 2023-06-14 PROCEDURE — 98967 PH1 ASSMT&MGMT NQHP 11-20: CPT | Mod: 93 | Performed by: DIETITIAN, REGISTERED

## 2023-06-14 NOTE — LETTER
6/14/2023         RE: Michelle Ramirez  [address]  [place] [address]        Dear Colleague,    Thank you for referring your patient, Michelle Ramirez, to the [facility]. Please see a copy of my visit note below.    Type of Service: Telephone Visit/ 11 minutes    Originating Location (Patient Location): Home  Distant Location (Provider Location): [facility]  Mode of Communication:  Telephone    Telephone Visit Start Time: 4pm  Telephone Visit End Time (telephone visit stop time): 4:11    How would patient like to obtain AVS? MyChart      Diabetes and Pregnancy Follow-up  Type of Service: Telephone Visit    How would patient like to obtain AVS? Not needed    Subjective/Objective:    Michelle Ramirez was called for a scheduled BG review. Last date of communication was: 5/31/23.    Gestational diabetes is being managed with diet and activity    Taking diabetes medications: no    Estimated Date of Delivery: Jul 18, 2023   Next OB visit: 6/16/23, patient goes every Tuesday and Friday    Blood Glucose/Ketone Log from 5/31/23-6/14/23:    Ketones checking weekly: trace  FBS: all BG < 95  Post am: all BG < 140 except for: 6/, big muffin from [employer], 6/13-sweet coconut buns, 6/  Post lunch: all BG < 140  Post dinner: all BG < 140 except for: 6/3-153, 2 pieces or cornbread, 6/9-pad [place] and cake for her baby shower, 6/, regular rice with sweet coconut drink          Assessment:    FBS are at goal. Elevated post meal BG are explainable with food choices and portions. We reviewed the elevations and plans to make changes with these types of foods if she tried them again, smaller portions and avoiding sugary beverages. Michelle continues to walk at work and when she gets home. She is hydrating with water throughout the day.      Plan/Response:  Meal Plan Recommendation: 30 carbs at breakfast, 45 carbs at lunch, 45 carbs at supper, 15-30 carbs at each of 3 snacks  between meals     1. Limit sweets to no more than once per week.  2. Limit rice and noodle portions to no more than one small bowl per meal, have a smaller portion for your breakfast meal.  Increase  portions of meat and vegetables at meals  3. Do not have sweets with a meal, save for a snack or have a fruit serving or protein.   4. Try not to skip meals, to avoid overeating at the next meal.   5. Avoid sticky rice  6. If you have kapun noodles, have only one portion.  7. If you have york/rice, have a smaller portion.   8. Avoid having fruit with meals, have as a snack instead.   9. Avoid all sugary beverages.       Exercise / activity plan: walk for 10-15 minutes after meals     Continue to check BG 4 times daily (fasting and one to two hour(s) after each meal).     Reduce ketone checks to once a week.     Follow-up in 2 weeks: 6/29/23.      Time Spent: 11 minutes    Any diabetes medication dose changes were made via the CDE Protocol and Collaborative Practice Agreement with the patient's OB/GYN provider. A copy of this encounter was shared with the provider.

## 2023-06-14 NOTE — PROGRESS NOTES
Type of Service: Telephone Visit/ 11 minutes    Originating Location (Patient Location): Home  Distant Location (Provider Location): [facility]  Mode of Communication:  Telephone    Telephone Visit Start Time: 4pm  Telephone Visit End Time (telephone visit stop time): 4:11    How would patient like to obtain AVS? Nicholast      Diabetes and Pregnancy Follow-up  Type of Service: Telephone Visit    How would patient like to obtain AVS? Not needed    Subjective/Objective:    Michelle Ramirez was called for a scheduled BG review. Last date of communication was: 5/31/23.    Gestational diabetes is being managed with diet and activity    Taking diabetes medications: no    Estimated Date of Delivery: Jul 18, 2023   Next OB visit: 6/16/23, patient goes every Tuesday and Friday    Blood Glucose/Ketone Log from 5/31/23-6/14/23:    Ketones checking weekly: trace  FBS: all BG < 95  Post am: all BG < 140 except for: 6/, big muffin from "[employer] 6/13-sweet coconut buns, 6/  Post lunch: all BG < 140  Post dinner: all BG < 140 except for: 6/3-153, 2 pieces or cornbread, 6/9-pad [place] and cake for her baby shower, 6/, regular rice with sweet coconut drink          Assessment:    FBS are at goal. Elevated post meal BG are explainable with food choices and portions. We reviewed the elevations and plans to make changes with these types of foods if she tried them again, smaller portions and avoiding sugary beverages. Michelle continues to walk at work and when she gets home. She is hydrating with water throughout the day.      Plan/Response:  Meal Plan Recommendation: 30 carbs at breakfast, 45 carbs at lunch, 45 carbs at supper, 15-30 carbs at each of 3 snacks between meals     1. Limit sweets to no more than once per week.  2. Limit rice and noodle portions to no more than one small bowl per meal, have a smaller portion for your breakfast meal.  Increase  portions of meat and vegetables at  meals  3. Do not have sweets with a meal, save for a snack or have a fruit serving or protein.   4. Try not to skip meals, to avoid overeating at the next meal.   5. Avoid sticky rice  6. If you have kapun noodles, have only one portion.  7. If you have york/rice, have a smaller portion.   8. Avoid having fruit with meals, have as a snack instead.   9. Avoid all sugary beverages.       Exercise / activity plan: walk for 10-15 minutes after meals     Continue to check BG 4 times daily (fasting and one to two hour(s) after each meal).     Reduce ketone checks to once a week.     Follow-up in 2 weeks: 6/29/23.      Time Spent: 11 minutes    Any diabetes medication dose changes were made via the CDE Protocol and Collaborative Practice Agreement with the patient's OB/GYN provider. A copy of this encounter was shared with the provider.

## 2023-06-14 NOTE — PATIENT INSTRUCTIONS
Meal Plan Recommendation: 30 carbs at breakfast, 45 carbs at lunch, 45 carbs at supper, 15-30 carbs at each of 3 snacks between meals     Limit sweets to no more than once per week.  Limit rice and noodle portions to no more than one small bowl per meal, have a smaller portion for your breakfast meal.  Increase  portions of meat and vegetables at meals  Do not have sweets with a meal, save for a snack or have a fruit serving or protein.   Try not to skip meals, to avoid overeating at the next meal.   Avoid sticky rice  If you have kapun noodles, have only one portion.  If you have york/rice, have a smaller portion.   Avoid having fruit with meals, have as a snack instead.   Avoid all sugary beverages.       Exercise / activity plan: walk for 10-15 minutes after meals     Continue to check BG 4 times daily (fasting and one to two hour(s) after each meal).     Reduce ketone checks to once a week.     Follow-up in 2 weeks: 6/29/23.

## 2023-06-16 ENCOUNTER — HOSPITAL ENCOUNTER (OUTPATIENT)
Facility: CLINIC | Age: 33
End: 2023-06-16
Admitting: OBSTETRICS & GYNECOLOGY
Payer: COMMERCIAL

## 2023-06-16 ENCOUNTER — HOSPITAL ENCOUNTER (OUTPATIENT)
Facility: CLINIC | Age: 33
Discharge: HOME | End: 2023-06-16
Attending: OBSTETRICS & GYNECOLOGY | Admitting: OBSTETRICS & GYNECOLOGY
Payer: COMMERCIAL

## 2023-06-16 VITALS
SYSTOLIC BLOOD PRESSURE: 110 MMHG | RESPIRATION RATE: 18 BRPM | HEIGHT: 65 IN | HEART RATE: 83 BPM | DIASTOLIC BLOOD PRESSURE: 63 MMHG | WEIGHT: 215 LBS | BODY MASS INDEX: 35.82 KG/M2 | TEMPERATURE: 98 F | OXYGEN SATURATION: 97 %

## 2023-06-16 PROBLEM — Z36.89 ENCOUNTER FOR TRIAGE IN PREGNANT PATIENT: Status: ACTIVE | Noted: 2023-06-16

## 2023-06-16 PROCEDURE — G0463 HOSPITAL OUTPT CLINIC VISIT: HCPCS

## 2023-06-16 RX ORDER — LIDOCAINE 40 MG/G
CREAM TOPICAL
Status: DISCONTINUED | OUTPATIENT
Start: 2023-06-16 | End: 2023-06-16 | Stop reason: HOSPADM

## 2023-06-16 ASSESSMENT — ACTIVITIES OF DAILY LIVING (ADL): ADLS_ACUITY_SCORE: 31

## 2023-06-16 NOTE — PLAN OF CARE
"Data: [facility] assessed in the [facility] for Per Dr. Gil,\"decels in clinic.\". Cervical exam deferred. Membranes intact. Contractions are not present. See flowsheets for fetal assessment documentation.     Action: Presumed adequate fetal oxygenation documented. Discharge instructions reviewed. Patient instructed to report change in fetal movement, vaginal leaking of fluid or bleeding, abdominal pain, or any concerns related to the pregnancy to provider/clinic. Reactive tracing, no decels noted in triage, zero contraction. Soft to palpate abdomen. Patient instructed to continue regular appointment in clinic. Patient in agreement.    Response: Orders to discharge home per Dr. Gil. Patient verbalized understanding of education and agreement with plan. Discharged to home at 1615.            Goal Outcome Evaluation:                        "

## 2023-06-20 ENCOUNTER — LAB REQUISITION (OUTPATIENT)
Dept: LAB | Facility: CLINIC | Age: 33
End: 2023-06-20
Payer: COMMERCIAL

## 2023-06-20 DIAGNOSIS — Z3A.36 36 WEEKS GESTATION OF PREGNANCY: ICD-10-CM

## 2023-06-20 PROCEDURE — 87653 STREP B DNA AMP PROBE: CPT | Mod: ORL | Performed by: NURSE PRACTITIONER

## 2023-06-21 LAB — GP B STREP DNA SPEC QL NAA+PROBE: NEGATIVE

## 2023-06-29 ENCOUNTER — VIRTUAL VISIT (OUTPATIENT)
Dept: EDUCATION SERVICES | Facility: CLINIC | Age: 33
End: 2023-06-29
Payer: COMMERCIAL

## 2023-06-29 DIAGNOSIS — O24.410 DIET CONTROLLED GESTATIONAL DIABETES MELLITUS (GDM), ANTEPARTUM: Primary | ICD-10-CM

## 2023-06-29 PROCEDURE — 98967 PH1 ASSMT&MGMT NQHP 11-20: CPT | Mod: 93 | Performed by: DIETITIAN, REGISTERED

## 2023-06-29 NOTE — PROGRESS NOTES
Lab results: Your cholesterol remains elevated  Recommend to start statin such as Crestor 5 mg daily  You should be taking your vitamin D3 2000 units daily and vitamin B12 500 mcg daily      The rest of your labs were normal  Type of Service: Telephone Visit/ 13 minutes    Originating Location (Patient Location): Home  Distant Location (Provider Location): [facility]  Mode of Communication:  Telephone    Telephone Visit Start Time: 4p  Telephone Visit End Time (telephone visit stop time): 4:13    How would patient like to obtain AVS? MyChart      Diabetes and Pregnancy Follow-up  Type of Service: Telephone Visit    How would patient like to obtain AVS? Not needed    Subjective/Objective:    Michelle Ramirez was called for a scheduled BG review. Last date of communication was: 6/14/23.    Gestational diabetes is being managed with diet and activity    Taking diabetes medications: no    Estimated Date of Delivery: Jul 18, 2023   Next OB visit: patient sees OB every Tuesday and Friday each week    Blood Glucose/Ketone Log from 6/15 -6/29:  Ketones: trace  FBS: patient reported all BG < 95    After breakfast: all BG < 140, except for 6/(steam roll)    After lunch: all BG < 140, except for 6/(fruit with lunch)    After dinner: all BG < 140, except for 6/(more rice), 6/(rice porridge), 6/( sweet Hmong dessert), 6/(sesame ball with dinner)          Assessment:  Michelle is feeling a bit more tired and less active, she is still trying to get walks in during her workday.  She noticed the sweet intake impacted her PP readings this week. We discussed having a sweet as a snack and adding in movement after a meal, especially if she had a sweet or felt like she ate more than her normal portion. Michelle mentioned that on 75/23 her OB will discuss with her a delivery timeline.      Ketones:trace  Fasting blood glucoses: 100% in target.  After breakfast: 93% in target.  After lunch: 93% in target.  After dinner: 79% in target.    Plan/Response:    Meal Plan Recommendation: 30 carbs at breakfast, 45 carbs at lunch, 45 carbs at supper, 15-30 carbs at each of 3 snacks between meals     1. Limit sweets to no  more than once per week: sesame ball or other sweets, have as a snack, not with a meal.   2. Limit rice and noodle portions to no more than one small bowl per meal, have a smaller portion for your breakfast meal.  Increase  portions of meat and vegetables at meals  3. Do not have sweets with a meal, save for a snack or have a fruit serving or protein.   4. Try not to skip meals, to avoid overeating at the next meal.   5. Avoid sticky rice and rice porridge  6. If you have kapun noodles, have only one portion.  7. If you have york/rice, have a smaller portion.   8. Avoid having fruit with meals, have as a snack instead.   9. Avoid all sugary beverages.        Exercise / activity plan: walk for 10-15 minutes after meals     Continue to check BG 4 times daily (fasting and one to two hour(s) after each meal).     Reduce ketone checks to once a week.     Follow-up : 7/7/23    Time Spent: 13 minutes    Any diabetes medication dose changes were made via the CDE Protocol and Collaborative Practice Agreement with the patient's primary care provider. A copy of this encounter was shared with the provider.

## 2023-06-29 NOTE — PATIENT INSTRUCTIONS
Meal Plan Recommendation: 30 carbs at breakfast, 45 carbs at lunch, 45 carbs at supper, 15-30 carbs at each of 3 snacks between meals     Limit sweets to no more than once per week: sesame ball or other sweets, have as a snack, not with a meal.   Limit rice and noodle portions to no more than one small bowl per meal, have a smaller portion for your breakfast meal.  Increase  portions of meat and vegetables at meals  Do not have sweets with a meal, save for a snack or have a fruit serving or protein.   Try not to skip meals, to avoid overeating at the next meal.   Avoid sticky rice and rice porridge  If you have kapun noodles, have only one portion.  If you have york/rice, have a smaller portion.   Avoid having fruit with meals, have as a snack instead.   Avoid all sugary beverages.        Exercise / activity plan: walk for 10-15 minutes after meals     Continue to check BG 4 times daily (fasting and one to two hour(s) after each meal).     Reduce ketone checks to once a week.     Follow-up : 7/7/23

## 2023-06-29 NOTE — LETTER
6/29/2023         RE: Michelle Ramirez  [address]  [place] [address]        Dear Colleague,    Thank you for referring your patient, Michelle Ramirez, to the [facility]. Please see a copy of my visit note below.    Type of Service: Telephone Visit/ 13 minutes    Originating Location (Patient Location): Home  Distant Location (Provider Location): [facility]  Mode of Communication:  Telephone    Telephone Visit Start Time: 4p  Telephone Visit End Time (telephone visit stop time): 4:13    How would patient like to obtain AVS? MyChart      Diabetes and Pregnancy Follow-up  Type of Service: Telephone Visit    How would patient like to obtain AVS? Not needed    Subjective/Objective:    Michelle Ramirez was called for a scheduled BG review. Last date of communication was: 6/14/23.    Gestational diabetes is being managed with diet and activity    Taking diabetes medications: no    Estimated Date of Delivery: Jul 18, 2023   Next OB visit: patient sees OB every Tuesday and Friday each week    Blood Glucose/Ketone Log from 6/15 -6/29:  Ketones: trace  FBS: patient reported all BG < 95    After breakfast: all BG < 140, except for 6/(steam roll)    After lunch: all BG < 140, except for 6/(fruit with lunch)    After dinner: all BG < 140, except for 6/(more rice), 6/(rice porridge), 6/( sweet Hmong dessert), 6/(sesame ball with dinner)          Assessment:  Michelle is feeling a bit more tired and less active, she is still trying to get walks in during her workday.  She noticed the sweet intake impacted her PP readings this week. We discussed having a sweet as a snack and adding in movement after a meal, especially if she had a sweet or felt like she ate more than her normal portion. Michelle mentioned that on 75/23 her OB will discuss with her a delivery timeline.      Ketones:trace  Fasting blood glucoses: 100% in target.  After breakfast: 93% in  target.  After lunch: 93% in target.  After dinner: 79% in target.    Plan/Response:    Meal Plan Recommendation: 30 carbs at breakfast, 45 carbs at lunch, 45 carbs at supper, 15-30 carbs at each of 3 snacks between meals     1. Limit sweets to no more than once per week: sesame ball or other sweets, have as a snack, not with a meal.   2. Limit rice and noodle portions to no more than one small bowl per meal, have a smaller portion for your breakfast meal.  Increase  portions of meat and vegetables at meals  3. Do not have sweets with a meal, save for a snack or have a fruit serving or protein.   4. Try not to skip meals, to avoid overeating at the next meal.   5. Avoid sticky rice and rice porridge  6. If you have kapun noodles, have only one portion.  7. If you have york/rice, have a smaller portion.   8. Avoid having fruit with meals, have as a snack instead.   9. Avoid all sugary beverages.        Exercise / activity plan: walk for 10-15 minutes after meals     Continue to check BG 4 times daily (fasting and one to two hour(s) after each meal).     Reduce ketone checks to once a week.     Follow-up : 7/7/23    Time Spent: 13 minutes    Any diabetes medication dose changes were made via the CDE Protocol and Collaborative Practice Agreement with the patient's primary care provider. A copy of this encounter was shared with the provider.

## 2023-07-07 ENCOUNTER — VIRTUAL VISIT (OUTPATIENT)
Dept: EDUCATION SERVICES | Facility: CLINIC | Age: 33
End: 2023-07-07
Payer: COMMERCIAL

## 2023-07-07 DIAGNOSIS — O24.410 DIET CONTROLLED GESTATIONAL DIABETES MELLITUS (GDM), ANTEPARTUM: Primary | ICD-10-CM

## 2023-07-07 PROCEDURE — 98967 PH1 ASSMT&MGMT NQHP 11-20: CPT | Mod: 93 | Performed by: DIETITIAN, REGISTERED

## 2023-07-07 NOTE — LETTER
"    7/7/2023         RE: Michelle Ramirez  [address]  [place] [address]        Dear Colleague,    Thank you for referring your patient, Michelle Ramirez, to the [facility]. Please see a copy of my visit note below.    Diabetes and Pregnancy Follow-up 13 minutes  Type of Service: Telephone Visit    How would patient like to obtain AVS? Not needed    Subjective/Objective:    Michelle Ramirez was called for a scheduled BG review. Last date of communication was: 6/29/23.    Gestational diabetes is being managed with diet and activity    Taking diabetes medications: no    Estimated Date of Delivery: Jul 18, 2023, patient is being induced on 7/11/23    Blood Glucose/Ketone Log:    Date Ketones Fasting Post Breakfast Post Lunch Post Supper   6/30 5 80 86+2 skip    7/1  78 126 122 194(buffet, ate too much)   7/2  77 89 133 160(ate out again)   7/3  76  116 115   7/4  75 162(five chix wings, cornstarch breading/rice)     7/5  80  131 94   7/6 5 81 89 100+2 98+2         Assessment:  FBS WNL. Michelle had three explainable elevated PP, two of which were when she had company in town and when out to eat, and \"had a lot to eat\". Patient will be induced on 7/11/23.  We discussed testing guidelines for after delivery. No further questions or concerns.       Ketones: trace.   Fasting blood glucoses: 100% in target.  After breakfast: 80% in target.  After lunch: 100% in target.  After dinner: 60% in target.    Plan/Response:  Meal Plan Recommendation: 30 carbs at breakfast, 45 carbs at lunch, 45 carbs at supper, 15-30 carbs at each of 3 snacks between meals     1. Limit sweets to no more than once per week: sesame ball or other sweets, have as a snack, not with a meal.   2. Limit rice and noodle portions to no more than one small bowl per meal, have a smaller portion for your breakfast meal.  Increase  portions of meat and vegetables at meals  3. Do not have sweets with a meal, save for a snack or have a fruit " serving or protein.   4. Try not to skip meals, to avoid overeating at the next meal.   5. Avoid sticky rice and rice porridge  6. If you have kapun noodles, have only one portion.  7. If you have york/rice, have a smaller portion.   8. Avoid having fruit with meals, have as a snack instead.   9. Avoid all sugary beverages.        Exercise / activity plan: walk for 10-15 minutes after meals     Continue to check BG 4 times daily (fasting and one to two hour(s) after each meal).     Reduce ketone checks to once a week.    Follow testing guidelines for after delivery.       Time Spent: 13 minutes    Any diabetes medication dose changes were made via the CDE Protocol and Collaborative Practice Agreement with the patient's OB/GYN provider. A copy of this encounter was shared with the provider.

## 2023-07-07 NOTE — PROGRESS NOTES
"Diabetes and Pregnancy Follow-up 13 minutes  Type of Service: Telephone Visit    How would patient like to obtain AVS? Not needed    Subjective/Objective:    Michelle Ramirez was called for a scheduled BG review. Last date of communication was: 6/29/23.    Gestational diabetes is being managed with diet and activity    Taking diabetes medications: no    Estimated Date of Delivery: Jul 18, 2023, patient is being induced on 7/11/23    Blood Glucose/Ketone Log:    Date Ketones Fasting Post Breakfast Post Lunch Post Supper   6/30 5 80 86+2 skip    7/1  78 126 122 194(buffet, ate too much)   7/2  77 89 133 160(ate out again)   7/3  76  116 115   7/4  75 162(five chix wings, cornstarch breading/rice)     7/5  80  131 94   7/6 5 81 89 100+2 98+2         Assessment:  FBS WNL. Michelle had three explainable elevated PP, two of which were when she had company in town and when out to eat, and \"had a lot to eat\". Patient will be induced on 7/11/23.  We discussed testing guidelines for after delivery. No further questions or concerns.       Ketones: trace.   Fasting blood glucoses: 100% in target.  After breakfast: 80% in target.  After lunch: 100% in target.  After dinner: 60% in target.    Plan/Response:  Meal Plan Recommendation: 30 carbs at breakfast, 45 carbs at lunch, 45 carbs at supper, 15-30 carbs at each of 3 snacks between meals     1. Limit sweets to no more than once per week: sesame ball or other sweets, have as a snack, not with a meal.   2. Limit rice and noodle portions to no more than one small bowl per meal, have a smaller portion for your breakfast meal.  Increase  portions of meat and vegetables at meals  3. Do not have sweets with a meal, save for a snack or have a fruit serving or protein.   4. Try not to skip meals, to avoid overeating at the next meal.   5. Avoid sticky rice and rice porridge  6. If you have kapun noodles, have only one portion.  7. If you have york/rice, have a smaller portion.   8. Avoid having " fruit with meals, have as a snack instead.   9. Avoid all sugary beverages.        Exercise / activity plan: walk for 10-15 minutes after meals     Continue to check BG 4 times daily (fasting and one to two hour(s) after each meal).     Reduce ketone checks to once a week.    Follow testing guidelines for after delivery.       Time Spent: 13 minutes    Any diabetes medication dose changes were made via the CDE Protocol and Collaborative Practice Agreement with the patient's OB/GYN provider. A copy of this encounter was shared with the provider.

## 2023-07-11 PROBLEM — Z34.90 PREGNANCY: Status: ACTIVE | Noted: 2023-07-11

## 2023-07-13 ENCOUNTER — HOSPITAL ENCOUNTER (INPATIENT)
Facility: HOSPITAL | Age: 33
End: 2023-07-13
Admitting: OBSTETRICS & GYNECOLOGY
Payer: COMMERCIAL

## 2023-07-14 ENCOUNTER — PATIENT OUTREACH (OUTPATIENT)
Dept: CARE COORDINATION | Facility: CLINIC | Age: 33
End: 2023-07-14
Payer: COMMERCIAL

## 2023-07-14 NOTE — PROGRESS NOTES
[facility]    Background: Transitional Care Management program identified per system criteria and reviewed by [facility] team for possible outreach.    Assessment: Upon chart review, [facility] Team member will not proceed with patient outreach related to this episode of Transitional Care Management program due to reason below:    Ok to close outreach per leaders as patient failed OB induction and plans on returning Monday for another induction attempt.    Plan: Transitional Care Management episode addressed appropriately per reason noted above.      Brandie Snow RN  [facility] Resource [place]    *Connected Care Resource Team does NOT follow patient ongoing. Referrals are identified based on internal discharge reports and the outreach is to ensure patient has an understanding of their discharge instructions.

## 2023-07-17 PROBLEM — O24.419 GESTATIONAL DIABETES: Status: ACTIVE | Noted: 2023-07-17

## 2023-07-19 ENCOUNTER — ANESTHESIA EVENT (OUTPATIENT)
Dept: OBGYN | Facility: HOSPITAL | Age: 33
End: 2023-07-19
Payer: COMMERCIAL

## 2023-07-19 ENCOUNTER — ANESTHESIA (OUTPATIENT)
Dept: OBGYN | Facility: HOSPITAL | Age: 33
End: 2023-07-19
Payer: COMMERCIAL

## 2023-07-19 ENCOUNTER — APPOINTMENT (OUTPATIENT)
Dept: INTERPRETER SERVICES | Facility: CLINIC | Age: 33
End: 2023-07-19
Payer: COMMERCIAL

## 2023-07-19 PROCEDURE — 250N000009 HC RX 250: Performed by: ANESTHESIOLOGY

## 2023-07-19 RX ORDER — LIDOCAINE HYDROCHLORIDE AND EPINEPHRINE 15; 5 MG/ML; UG/ML
INJECTION, SOLUTION EPIDURAL PRN
Status: DISCONTINUED | OUTPATIENT
Start: 2023-07-19 | End: 2023-07-20

## 2023-07-19 RX ORDER — LIDOCAINE HYDROCHLORIDE 20 MG/ML
INJECTION, SOLUTION INFILTRATION; PERINEURAL PRN
Status: DISCONTINUED | OUTPATIENT
Start: 2023-07-19 | End: 2023-07-20

## 2023-07-19 RX ADMIN — LIDOCAINE HYDROCHLORIDE 7 ML: 20 INJECTION, SOLUTION INFILTRATION; PERINEURAL at 21:41

## 2023-07-19 RX ADMIN — LIDOCAINE HYDROCHLORIDE,EPINEPHRINE BITARTRATE 2 ML: 15; .005 INJECTION, SOLUTION EPIDURAL; INFILTRATION; INTRACAUDAL; PERINEURAL at 14:32

## 2023-07-19 RX ADMIN — LIDOCAINE HYDROCHLORIDE,EPINEPHRINE BITARTRATE 3 ML: 15; .005 INJECTION, SOLUTION EPIDURAL; INFILTRATION; INTRACAUDAL; PERINEURAL at 14:30

## 2023-07-19 NOTE — ANESTHESIA PREPROCEDURE EVALUATION
Anesthesia Pre-Procedure Evaluation    Patient: Michelle Ramirez   MRN: [id number] : 1990        Procedure :   Cervical Ripening       Past Medical History:   Diagnosis Date     Gestational diabetes mellitus 2023      Past Surgical History:   Procedure Laterality Date     DILATION AND CURETTAGE SUCTION N/A 2021    Procedure: DILATION AND CURETTAGE, UTERUS, USING SUCTION;  Surgeon: Pinky Damon DO;  Location: [facility] Main OR     NO HISTORY OF SURGERY        No Known Allergies   Social History     Tobacco Use     Smoking status: Never     Smokeless tobacco: Never   Substance Use Topics     Alcohol use: Never      Wt Readings from Last 1 Encounters:   23 98.9 kg (218 lb)        Anesthesia Evaluation   Pt has had prior anesthetic.     No history of anesthetic complications       ROS/MED HX  ENT/Pulmonary:  - neg pulmonary ROS     Neurologic:  - neg neurologic ROS     Cardiovascular:  - neg cardiovascular ROS     METS/Exercise Tolerance:     Hematologic:  - neg hematologic  ROS     Musculoskeletal:       GI/Hepatic:  - neg GI/hepatic ROS     Renal/Genitourinary:       Endo:     (+) Obesity, gestational diabetes, (-) Type I DM   Psychiatric/Substance Use:  - neg psychiatric ROS     Infectious Disease:       Malignancy:       Other:            Physical Exam    Airway        Mallampati: III   TM distance: > 3 FB   Neck ROM: full   Mouth opening: > 3 cm    Respiratory Devices and Support         Dental  no notable dental history         Cardiovascular   cardiovascular exam normal          Pulmonary   pulmonary exam normal                OUTSIDE LABS:  CBC:   Lab Results   Component Value Date    WBC 5.6 2022    WBC 7.5 2022    HGB 11.3 (L) 2023    HGB 10.8 (L) 2023    HCT 45.5 2022    HCT 39.5 2022    PLT  2022      Comment:      Plate count is not available, platelet clump present.   This is a corrected result. Previous result was 123 10e3/uL on 2022  at  8:20 AM CST     07/19/2022     BMP:   Lab Results   Component Value Date     08/26/2021    POTASSIUM 3.4 (L) 08/26/2021    CHLORIDE 105 08/26/2021    CO2 22 08/26/2021    BUN 9 08/26/2021    CR 0.67 08/26/2021    GLC 88 07/19/2023    GLC 72 07/19/2023     COAGS:   Lab Results   Component Value Date    INR 0.96 08/30/2022     POC:   Lab Results   Component Value Date    HCG Positive (A) 08/20/2021     HEPATIC: No results found for: ALBUMIN, PROTTOTAL, ALT, AST, GGT, ALKPHOS, BILITOTAL, BILIDIRECT, NOHEMI  OTHER:   Lab Results   Component Value Date    A1C 5.3 07/17/2023    MADIHA 9.3 08/26/2021    TSH 1.83 01/12/2023    T4 1.06 01/12/2023       Anesthesia Plan    ASA Status:  3, emergent       Anesthesia Type: Spinal.              Consents    Anesthesia Plan(s) and associated risks, benefits, and realistic alternatives discussed. Questions answered and patient/representative(s) expressed understanding.    - Discussed:     - Discussed with:  Patient, Spouse         Postoperative Care            Comments:    Other Comments: LE has been working OK but likely not well enough for a CS. Discussed with patient and FOB that I would prefer to pull LE and do an SAB. In addition, patient has had too much LA via LE in the last few hours to do TAP blocks without risking LAST.    Duramorph, Fentanyl.  PE infusion.  Decadron, Zofran.  Toradol, Tylenol.       neg OB ROS.       AVANI CANO MD

## 2023-07-19 NOTE — ANESTHESIA PROCEDURE NOTES
Epidural catheter Procedure Note    Pre-Procedure   Staff -        Anesthesiologist:  Popeye Werner MD       Performed By: anesthesiologist       Location: OB       Procedure Start/Stop Times: 7/19/2023 3:50 PM and 7/19/2023 4:02 PM       Pre-Anesthestic Checklist: patient identified, IV checked, risks and benefits discussed, informed consent, monitors and equipment checked, pre-op evaluation, at physician/surgeon's request and post-op pain management  Timeout:       Correct Patient: Yes        Correct Procedure: Yes        Correct Site: Yes        Correct Position: Yes   Procedure Documentation  Procedure: epidural catheter       Patient Position: sitting       Patient Prep/Sterile Barriers: sterile gloves, mask       Skin prep: Chloraprep       Local skin infiltrated with 3 mL of 1% lidocaine.        Insertion Site: L2-3. (midline approach).       Technique: LORT air        ANDREW at 8 cm.       Needle type: [employer].       Needle Gauge: 18.        Needle Length (Inches): 3.5        Catheter: 20 G.          Catheter threaded easily.         5 cm epidural space.         Threaded 13 cm at skin.         # of attempts: 1 and  # of redirects:  1    Assessment/Narrative         Paresthesias: No.       Test dose of 3 mL lidocaine 1.5% w/ 1:200,000 epinephrine at.         Test dose negative, 3 minutes after injection, for signs of intravascular, subdural, or intrathecal injection.       Insertion/Infusion Method: LORT air       No aspiration negative for Heme or CSF via Epidural Catheter.    Medication(s) Administered   Medication Administration Time: 7/19/2023 3:50 PM     Comments:  Crisp loss of resistance at 8 cm. Catheter threaded easily 5 cm into the epidural space. Negative aspiration for heme and CSF. Negative test dose. No signs of LAST. No pain or paresthesias upon placement. No complications.    Prior to leaving room, patient reported much improved pain control.        FOR [facility] ([facility] ONLY:   Pain  "Team Contact information: please page the Pain Team Via [facility]. Search \"Pain\". During daytime hours, please page the attending first. At night please page the resident first.      "

## 2023-07-19 NOTE — ANESTHESIA PROCEDURE NOTES
Epidural catheter Procedure Note    Pre-Procedure   Staff -        Anesthesiologist:  Popeye Werner MD       Performed By: anesthesiologist       Location: OB       Procedure Start/Stop Times: 7/19/2023 2:25 PM and 7/19/2023 2:33 PM       Pre-Anesthestic Checklist: patient identified, IV checked, risks and benefits discussed, informed consent, monitors and equipment checked, pre-op evaluation, at physician/surgeon's request and post-op pain management  Timeout:       Correct Patient: Yes        Correct Procedure: Yes        Correct Site: Yes        Correct Position: Yes   Procedure Documentation  Procedure: epidural catheter       Patient Position: sitting       Patient Prep/Sterile Barriers: sterile gloves, mask       Skin prep: Chloraprep       Local skin infiltrated with 3 mL of 1% lidocaine.        Insertion Site: L3-4. (midline approach).       Technique: LORT air        ANDREW at 8 cm.       Needle type: [employer].       Needle Gauge: 18.        Needle Length (Inches): 3.5        Catheter: 20 G.          Catheter threaded easily.         4.5 cm epidural space.         Threaded 12.5 cm at skin.         # of attempts: 1 and  # of redirects:  1    Assessment/Narrative         Paresthesias: No.       Test dose of 3 mL lidocaine 1.5% w/ 1:200,000 epinephrine at.         Test dose negative, 3 minutes after injection, for signs of intravascular, subdural, or intrathecal injection.       Insertion/Infusion Method: LORT air       No aspiration negative for Heme or CSF via Epidural Catheter.    Medication(s) Administered   Medication Administration Time: 7/19/2023 2:25 PM     Comments:  Crisp loss of resistance at 8 cm. Catheter threaded easily 4.5 cm into the epidural space. Negative aspiration for heme and CSF. Negative test dose. No signs of LAST. No pain or paresthesias upon placement. No complications.    Prior to leaving room, patient reported shorter, less painful contractions.        FOR [facility] ([facility])  "ONLY:   Pain Team Contact information: please page the Pain Team Via [facility]. Search \"Pain\". During daytime hours, please page the attending first. At night please page the resident first.      "

## 2023-07-20 PROCEDURE — 250N000011 HC RX IP 250 OP 636: Performed by: ANESTHESIOLOGY

## 2023-07-20 PROCEDURE — 250N000011 HC RX IP 250 OP 636: Performed by: OBSTETRICS & GYNECOLOGY

## 2023-07-20 PROCEDURE — 250N000009 HC RX 250: Performed by: NURSE ANESTHETIST, CERTIFIED REGISTERED

## 2023-07-20 PROCEDURE — 258N000003 HC RX IP 258 OP 636: Performed by: OBSTETRICS & GYNECOLOGY

## 2023-07-20 PROCEDURE — 258N000003 HC RX IP 258 OP 636: Performed by: NURSE ANESTHETIST, CERTIFIED REGISTERED

## 2023-07-20 PROCEDURE — 250N000011 HC RX IP 250 OP 636: Mod: JZ | Performed by: NURSE ANESTHETIST, CERTIFIED REGISTERED

## 2023-07-20 RX ORDER — ONDANSETRON 2 MG/ML
INJECTION INTRAMUSCULAR; INTRAVENOUS PRN
Status: DISCONTINUED | OUTPATIENT
Start: 2023-07-20 | End: 2023-07-20

## 2023-07-20 RX ORDER — FENTANYL CITRATE 50 UG/ML
INJECTION, SOLUTION INTRAMUSCULAR; INTRAVENOUS
Status: COMPLETED | OUTPATIENT
Start: 2023-07-20 | End: 2023-07-20

## 2023-07-20 RX ORDER — MORPHINE SULFATE 1 MG/ML
INJECTION, SOLUTION EPIDURAL; INTRATHECAL; INTRAVENOUS
Status: COMPLETED | OUTPATIENT
Start: 2023-07-20 | End: 2023-07-20

## 2023-07-20 RX ORDER — SODIUM CHLORIDE, SODIUM LACTATE, POTASSIUM CHLORIDE, CALCIUM CHLORIDE 600; 310; 30; 20 MG/100ML; MG/100ML; MG/100ML; MG/100ML
INJECTION, SOLUTION INTRAVENOUS CONTINUOUS PRN
Status: DISCONTINUED | OUTPATIENT
Start: 2023-07-20 | End: 2023-07-20

## 2023-07-20 RX ORDER — BUPIVACAINE HYDROCHLORIDE 2.5 MG/ML
INJECTION, SOLUTION EPIDURAL; INFILTRATION; INTRACAUDAL PRN
Status: DISCONTINUED | OUTPATIENT
Start: 2023-07-20 | End: 2023-07-20

## 2023-07-20 RX ORDER — BUPIVACAINE HYDROCHLORIDE 7.5 MG/ML
INJECTION, SOLUTION INTRASPINAL
Status: COMPLETED | OUTPATIENT
Start: 2023-07-20 | End: 2023-07-20

## 2023-07-20 RX ORDER — OXYTOCIN/0.9 % SODIUM CHLORIDE 30/500 ML
PLASTIC BAG, INJECTION (ML) INTRAVENOUS CONTINUOUS PRN
Status: DISCONTINUED | OUTPATIENT
Start: 2023-07-20 | End: 2023-07-20

## 2023-07-20 RX ADMIN — PHENYLEPHRINE HYDROCHLORIDE 0.5 MCG/KG/MIN: 10 INJECTION INTRAVENOUS at 04:48

## 2023-07-20 RX ADMIN — PHENYLEPHRINE HYDROCHLORIDE 100 MCG: 10 INJECTION INTRAVENOUS at 05:17

## 2023-07-20 RX ADMIN — Medication 0.15 MG: at 04:40

## 2023-07-20 RX ADMIN — PHENYLEPHRINE HYDROCHLORIDE 100 MCG: 10 INJECTION INTRAVENOUS at 05:21

## 2023-07-20 RX ADMIN — BUPIVACAINE HYDROCHLORIDE IN DEXTROSE 1.5 ML: 7.5 INJECTION, SOLUTION SUBARACHNOID at 04:40

## 2023-07-20 RX ADMIN — AZITHROMYCIN MONOHYDRATE 500 MG: 500 INJECTION, POWDER, LYOPHILIZED, FOR SOLUTION INTRAVENOUS at 04:40

## 2023-07-20 RX ADMIN — BUPIVACAINE HYDROCHLORIDE 9 ML: 2.5 INJECTION, SOLUTION EPIDURAL; INFILTRATION; INTRACAUDAL at 02:10

## 2023-07-20 RX ADMIN — ONDANSETRON 4 MG: 2 INJECTION INTRAMUSCULAR; INTRAVENOUS at 04:48

## 2023-07-20 RX ADMIN — PHENYLEPHRINE HYDROCHLORIDE 200 MCG: 10 INJECTION INTRAVENOUS at 04:49

## 2023-07-20 RX ADMIN — Medication 300 ML/HR: at 05:15

## 2023-07-20 RX ADMIN — Medication 2 G: at 04:38

## 2023-07-20 RX ADMIN — FENTANYL CITRATE 25 MCG: 50 INJECTION, SOLUTION INTRAMUSCULAR; INTRAVENOUS at 04:40

## 2023-07-20 RX ADMIN — SODIUM CHLORIDE, POTASSIUM CHLORIDE, SODIUM LACTATE AND CALCIUM CHLORIDE: 600; 310; 30; 20 INJECTION, SOLUTION INTRAVENOUS at 04:34

## 2023-07-20 NOTE — ANESTHESIA CARE TRANSFER NOTE
Patient: Michelle Ramirez    Procedure: Procedure(s):   SECTION  Cervical Ripening    Diagnosis: Arrest of dilation, delivered, current hospitalization [O62.1]  Diagnosis Additional Information: No value filed.    Anesthesia Type:   Epidural     Note:    Oropharynx: oropharynx clear of all foreign objects  Level of Consciousness: drowsy  Oxygen Supplementation: face mask  Level of Supplemental Oxygen (L/min / FiO2): 6  Independent Airway: airway patency satisfactory and stable  Dentition: dentition unchanged  Vital Signs Stable: post-procedure vital signs reviewed and stable  Report to RN Given: handoff report given  Patient transferred to: PACU    Handoff Report: Identifed the Patient, Identified the Reponsible Provider, Reviewed the pertinent medical history, Discussed the surgical course, Reviewed Intra-OP anesthesia mangement and issues during anesthesia, Set expectations for post-procedure period and Allowed opportunity for questions and acknowledgement of understanding      Vitals:  Vitals Value Taken Time   /79 23 0606   Temp 37.2  C (98.9  F) 23 0606   Pulse 86 23 0606   Resp 14 23 0606   SpO2 98 % 23 0606       Electronically Signed By: KAILA Zamorano CRNA  2023  6:07 AM

## 2023-07-20 NOTE — ANESTHESIA PROCEDURE NOTES
"Intrathecal injection Procedure Note    Pre-Procedure   Staff -        Anesthesiologist:  Rickey Lucero MD       Performed By: anesthesiologist       Location: OB       Procedure Start/Stop Times: 7/20/2023 4:40 AM and 7/20/2023 4:46 AM       Pre-Anesthestic Checklist: patient identified, IV checked, risks and benefits discussed, informed consent, monitors and equipment checked and pre-op evaluation  Timeout:       Correct Patient: Yes        Correct Procedure: Yes        Correct Site: Yes        Correct Position: Yes   Procedure Documentation  Procedure: intrathecal injection       Patient Position: sitting       Patient Prep/Sterile Barriers: sterile gloves, mask, patient draped       Skin prep: Chloraprep       Insertion Site: L4-5. (midline approach).       Needle Gauge: 24.        Needle Length (Inches): 4        Spinal Needle Type: Pencan       Introducer used       Introducer: 20 G       # of attempts: 1 and  # of redirects:  0    Assessment/Narrative         Paresthesias: No.       CSF fluid: clear.    Medication(s) Administered   0.75% Hyperbaric Bupivacaine (Intrathecal) - Intrathecal   1.5 mL - 7/20/2023 4:40:00 AM  Fentanyl PF (Intrathecal) - Intrathecal   25 mcg - 7/20/2023 4:40:00 AM  Morphine PF 1 mg/mL (Intrathecal) - Intrathecal   0.15 mg - 7/20/2023 4:40:00 AM  Medication Administration Time: 7/20/2023 4:40 AM      FOR [facility] ([facility]) ONLY:   Pain Team Contact information: please page the Pain Team Via [employer]. Search \"Pain\". During daytime hours, please page the attending first. At night please page the resident first.      "

## 2023-07-21 NOTE — ANESTHESIA POSTPROCEDURE EVALUATION
Patient: Michelle Ramirez    Procedure: Procedure(s):   SECTION  Cervical Ripening    Anesthesia Type:  Epidural    Note:  Disposition: Inpatient   Postop Pain Control: Uneventful            Sign Out: Well controlled pain   PONV: No   Neuro/Psych: Uneventful            Sign Out: Acceptable/Baseline neuro status   Airway/Respiratory: Uneventful            Sign Out: Acceptable/Baseline resp. status   CV/Hemodynamics: Uneventful            Sign Out: Acceptable CV status; No obvious hypovolemia; No obvious fluid overload   Other NRE: NONE   DID A NON-ROUTINE EVENT OCCUR?            Last vitals:  Vitals:    23 0100 23 0430 23 0810   BP: 100/64 110/70 114/68   Pulse:      Resp: 16 16 16   Temp: 36.8  C (98.3  F) 36.8  C (98.3  F) 36.7  C (98  F)   SpO2: 99% 98% 97%       Electronically Signed By: Donna Fong MD  2023  1:08 PM

## 2023-07-23 ENCOUNTER — APPOINTMENT (OUTPATIENT)
Dept: CT IMAGING | Facility: HOSPITAL | Age: 33
End: 2023-07-23
Payer: COMMERCIAL

## 2023-07-23 ENCOUNTER — HOSPITAL ENCOUNTER (EMERGENCY)
Facility: HOSPITAL | Age: 33
Discharge: HOME | End: 2023-07-23
Attending: STUDENT IN AN ORGANIZED HEALTH CARE EDUCATION/TRAINING PROGRAM | Admitting: STUDENT IN AN ORGANIZED HEALTH CARE EDUCATION/TRAINING PROGRAM
Payer: COMMERCIAL

## 2023-07-23 VITALS
RESPIRATION RATE: 22 BRPM | SYSTOLIC BLOOD PRESSURE: 138 MMHG | BODY MASS INDEX: 41.23 KG/M2 | HEIGHT: 60 IN | TEMPERATURE: 98.8 F | HEART RATE: 81 BPM | DIASTOLIC BLOOD PRESSURE: 84 MMHG | WEIGHT: 210 LBS | OXYGEN SATURATION: 99 %

## 2023-07-23 DIAGNOSIS — R10.10 PAIN OF UPPER ABDOMEN: ICD-10-CM

## 2023-07-23 DIAGNOSIS — E87.6 HYPOKALEMIA: ICD-10-CM

## 2023-07-23 DIAGNOSIS — Z98.891 STATUS POST C-SECTION: ICD-10-CM

## 2023-07-23 LAB
ALBUMIN SERPL BCG-MCNC: 3.1 G/DL (ref 3.5–5.2)
ALBUMIN UR-MCNC: 70 MG/DL
ALP SERPL-CCNC: 126 U/L (ref 35–104)
ALT SERPL W P-5'-P-CCNC: 22 U/L (ref 0–50)
ANION GAP SERPL CALCULATED.3IONS-SCNC: 11 MMOL/L (ref 7–15)
APPEARANCE UR: ABNORMAL
AST SERPL W P-5'-P-CCNC: 31 U/L (ref 0–45)
BACTERIA #/AREA URNS HPF: ABNORMAL /HPF
BASOPHILS # BLD AUTO: 0 10E3/UL (ref 0–0.2)
BASOPHILS NFR BLD AUTO: 0 %
BILIRUB DIRECT SERPL-MCNC: <0.2 MG/DL (ref 0–0.3)
BILIRUB SERPL-MCNC: 0.2 MG/DL
BILIRUB UR QL STRIP: NEGATIVE
BUN SERPL-MCNC: 8.5 MG/DL (ref 6–20)
CALCIUM SERPL-MCNC: 8.5 MG/DL (ref 8.6–10)
CHLORIDE SERPL-SCNC: 105 MMOL/L (ref 98–107)
COLOR UR AUTO: ABNORMAL
CREAT SERPL-MCNC: 0.58 MG/DL (ref 0.51–0.95)
DEPRECATED HCO3 PLAS-SCNC: 22 MMOL/L (ref 22–29)
EOSINOPHIL # BLD AUTO: 0.1 10E3/UL (ref 0–0.7)
EOSINOPHIL NFR BLD AUTO: 1 %
ERYTHROCYTE [DISTWIDTH] IN BLOOD BY AUTOMATED COUNT: 13.3 % (ref 10–15)
GFR SERPL CREATININE-BSD FRML MDRD: >90 ML/MIN/1.73M2
GLUCOSE SERPL-MCNC: 73 MG/DL (ref 70–99)
GLUCOSE UR STRIP-MCNC: NEGATIVE MG/DL
HCT VFR BLD AUTO: 27.7 % (ref 35–47)
HGB BLD-MCNC: 9 G/DL (ref 11.7–15.7)
HGB UR QL STRIP: ABNORMAL
HOLD SPECIMEN: NORMAL
HOLD SPECIMEN: NORMAL
IMM GRANULOCYTES # BLD: 0.1 10E3/UL
IMM GRANULOCYTES NFR BLD: 1 %
KETONES UR STRIP-MCNC: ABNORMAL MG/DL
LEUKOCYTE ESTERASE UR QL STRIP: ABNORMAL
LIPASE SERPL-CCNC: 22 U/L (ref 13–60)
LYMPHOCYTES # BLD AUTO: 1.3 10E3/UL (ref 0.8–5.3)
LYMPHOCYTES NFR BLD AUTO: 12 %
MAGNESIUM SERPL-MCNC: 1.8 MG/DL (ref 1.7–2.3)
MCH RBC QN AUTO: 28.7 PG (ref 26.5–33)
MCHC RBC AUTO-ENTMCNC: 32.5 G/DL (ref 31.5–36.5)
MCV RBC AUTO: 88 FL (ref 78–100)
MONOCYTES # BLD AUTO: 0.5 10E3/UL (ref 0–1.3)
MONOCYTES NFR BLD AUTO: 5 %
MUCOUS THREADS #/AREA URNS LPF: PRESENT /LPF
NEUTROPHILS # BLD AUTO: 9.2 10E3/UL (ref 1.6–8.3)
NEUTROPHILS NFR BLD AUTO: 81 %
NITRATE UR QL: NEGATIVE
NRBC # BLD AUTO: 0 10E3/UL
NRBC BLD AUTO-RTO: 0 /100
PH UR STRIP: 6 [PH] (ref 5–7)
PLATELET # BLD AUTO: 201 10E3/UL (ref 150–450)
POTASSIUM SERPL-SCNC: 3.3 MMOL/L (ref 3.4–5.3)
PROT SERPL-MCNC: 6.1 G/DL (ref 6.4–8.3)
RBC # BLD AUTO: 3.14 10E6/UL (ref 3.8–5.2)
RBC URINE: >182 /HPF
SODIUM SERPL-SCNC: 138 MMOL/L (ref 136–145)
SP GR UR STRIP: 1.01 (ref 1–1.03)
SQUAMOUS EPITHELIAL: 3 /HPF
UROBILINOGEN UR STRIP-MCNC: <2 MG/DL
WBC # BLD AUTO: 11.2 10E3/UL (ref 4–11)
WBC URINE: >182 /HPF

## 2023-07-23 PROCEDURE — 36415 COLL VENOUS BLD VENIPUNCTURE: CPT

## 2023-07-23 PROCEDURE — 83690 ASSAY OF LIPASE: CPT

## 2023-07-23 PROCEDURE — 85025 COMPLETE CBC W/AUTO DIFF WBC: CPT

## 2023-07-23 PROCEDURE — 81001 URINALYSIS AUTO W/SCOPE: CPT | Performed by: STUDENT IN AN ORGANIZED HEALTH CARE EDUCATION/TRAINING PROGRAM

## 2023-07-23 PROCEDURE — 74177 CT ABD & PELVIS W/CONTRAST: CPT

## 2023-07-23 PROCEDURE — 82248 BILIRUBIN DIRECT: CPT

## 2023-07-23 PROCEDURE — 87086 URINE CULTURE/COLONY COUNT: CPT | Performed by: STUDENT IN AN ORGANIZED HEALTH CARE EDUCATION/TRAINING PROGRAM

## 2023-07-23 PROCEDURE — 250N000011 HC RX IP 250 OP 636: Mod: JZ

## 2023-07-23 PROCEDURE — 80053 COMPREHEN METABOLIC PANEL: CPT

## 2023-07-23 PROCEDURE — 83735 ASSAY OF MAGNESIUM: CPT

## 2023-07-23 PROCEDURE — 81001 URINALYSIS AUTO W/SCOPE: CPT

## 2023-07-23 PROCEDURE — 250N000013 HC RX MED GY IP 250 OP 250 PS 637

## 2023-07-23 PROCEDURE — 99285 EMERGENCY DEPT VISIT HI MDM: CPT | Mod: 25

## 2023-07-23 RX ORDER — POTASSIUM CHLORIDE 1.5 G/1.58G
20 POWDER, FOR SOLUTION ORAL ONCE
Status: COMPLETED | OUTPATIENT
Start: 2023-07-23 | End: 2023-07-23

## 2023-07-23 RX ORDER — IOPAMIDOL 755 MG/ML
90 INJECTION, SOLUTION INTRAVASCULAR ONCE
Status: COMPLETED | OUTPATIENT
Start: 2023-07-23 | End: 2023-07-23

## 2023-07-23 RX ORDER — ACETAMINOPHEN 500 MG
1000 TABLET ORAL ONCE
Status: COMPLETED | OUTPATIENT
Start: 2023-07-23 | End: 2023-07-23

## 2023-07-23 RX ADMIN — POTASSIUM CHLORIDE 20 MEQ: 1.5 POWDER, FOR SOLUTION ORAL at 10:18

## 2023-07-23 RX ADMIN — ACETAMINOPHEN 1000 MG: 500 TABLET ORAL at 08:32

## 2023-07-23 RX ADMIN — IOPAMIDOL 90 ML: 755 INJECTION, SOLUTION INTRAVENOUS at 09:22

## 2023-07-23 ASSESSMENT — ACTIVITIES OF DAILY LIVING (ADL)
ADLS_ACUITY_SCORE: 35
ADLS_ACUITY_SCORE: 35

## 2023-07-23 ASSESSMENT — ENCOUNTER SYMPTOMS
BRUISES/BLEEDS EASILY: 0
VOMITING: 0
DIARRHEA: 0
CONSTIPATION: 1
SHORTNESS OF BREATH: 0
HEMATURIA: 1
CHILLS: 0
FLANK PAIN: 0
ABDOMINAL PAIN: 1
FREQUENCY: 0
NAUSEA: 0
DYSURIA: 0
FEVER: 0
HEADACHES: 0
BLOOD IN STOOL: 0

## 2023-07-23 NOTE — ED TRIAGE NOTES
Pt presents day 4 post op from  and was released from hospital yesterday. Pt reports having sudden onset of upper abd pain immediately after drinking water this am. Pt reports she has been passing gas. Pt is not breastfeeding.     Triage Assessment     Row Name 23 0752       Triage Assessment (Adult)    Airway WDL WDL       Respiratory WDL    Respiratory WDL WDL       Skin Circulation/Temperature WDL    Skin Circulation/Temperature WDL WDL       Cardiac WDL    Cardiac WDL WDL       Peripheral/Neurovascular WDL    Peripheral Neurovascular WDL WDL       Cognitive/Neuro/Behavioral WDL    Cognitive/Neuro/Behavioral WDL WDL

## 2023-07-23 NOTE — ED PROVIDER NOTES
EMERGENCY DEPARTMENT ENCOUNTER      NAME: Michelle Ramirez  AGE: 33 year old female  YOB: 1990  MRN: [id number]  EVALUATION DATE & TIME: 2023  7:57 AM    PCP: [facility]    ED PROVIDER: Michelle Sherman PA-C      Chief Complaint   Patient presents with    Post-op Problem    Abdominal Pain         FINAL IMPRESSION:  1. Pain of upper abdomen    2. Status post     3. Hypokalemia          ED COURSE & MEDICAL DECISION MAKIN:03 AM Met with patient for initial interview. Plan for care discussed.  8:28 AM Staffed patient with Dr. Mejia.  10:00 AM Reevaluated and updated patient. I discussed the plan for discharge with the patient, and patient is agreeable. We discussed supportive cares at home and reasons for return to the ER including new or worsening symptoms. All questions and concerns addressed. Patient to be discharged by RN.    Pertinent Labs & Imaging studies reviewed. (See chart for details)  33 year old female with a history of gestational diabetes presents to the Emergency Department for evaluation of upper abdominal pain. Per chart review, patient underwent uncomplicated full-term  on 23 after labor arrest and was discharged yesterday. Upon exam, patient is afebrile, hemodynamically stable, but appears uncomfortable. Diffuse upper abdominal tenderness to palpation with voluntary guarding, no rebound or other peritoneal signs. Differential diagnosis includes but not limited to gallbladder pathology, pancreatitis, gastritis, SBO, constipation, UTI, electrolyte abnormality, anemia, dehydration. Based on patient's presenting symptoms, laboratories and imaging were ordered.    CBC with improved WBC from 15.4 to 11.2 today consistent with recent pregnancy and . BMP revealed mild hypokalemia at 3.3, which was replaced in the ED today. Mg WNL. HFP revealed mildly elevated alk phos at 126 consistent with recent pregnancy, otherwise AST and ALT  WNL. Lipase WNL. CT revealed typical post-op changes without evidence of other acute pathology to explain pain. UA with blood likely contaminating sample, low suspicion for UTI as no urinary symptoms or evidence of cystitis on CT scan. Culture pending. Using shared decision making, antibiotic for UTI deferred at this time.     Patient was treated with Tylenol upon arrival with improvement in symptoms. Symptoms and workup most consistent with possible constipation or bowel spasm given has not had a bowel movement since discharge and not passing gas vs early viral gastritis. Patient was made aware of the above findings. Plan to discharge patient home with symptomatic management, strict return precautions, and close follow up with their OB/GYN or PCP for reevaluation and ongoing management. The patient was stable and well appearing upon discharge. The patient was advised to return to the ER if any new or worsening symptoms develop. The patient verbalizes understanding and agrees with the plan.     Medical Decision Making    History:  Supplemental history from: Documented in chart, if applicable  External Record(s) reviewed: Documented in chart, if applicable.    Work Up:  Chart documentation includes differential considered and any EKGs or imaging independently interpreted by provider, where specified.  In additional to work up documented, I considered the following work up: Documented in chart, if applicable.    External consultation:  Discussion of management with another provider: Documented in chart, if applicable    Complicating factors:  Care impacted by chronic illness: Other: gestational diabetes  Care affected by social determinants of health: N/A    Disposition considerations: Discharge. No recommendations on prescription strength medication(s). See documentation for any additional details.        MEDICATIONS GIVEN IN THE EMERGENCY:  Medications   acetaminophen (TYLENOL) tablet 1,000 mg (1,000 mg Oral $Given  23 0832)   iopamidol (ISOVUE-370) solution 90 mL (90 mLs Intravenous $Given 23 0922)   potassium chloride (KLOR-CON) Packet 20 mEq (20 mEq Oral $Given 23 1018)       NEW PRESCRIPTIONS STARTED AT TODAY'S ER VISIT  New Prescriptions    No medications on file          =================================================================    HPI    Patient information was obtained from: patient    Use of : Yes - Phone, Language Margarita Ramirez is a 33 year old female with a pertinent history of gestational diabetes who presents to this ED for evaluation of upper abdominal pain. Per chart review, patient underwent uncomplicated full-term  on 23 after labor arrest and was discharged yesterday.  Since then, patient woke up this morning drink a sip of water and had abrupt onset upper abdominal pain that has been constant and increasing in severity since.  She reports feeling chilled, but denies associated fever, nausea, vomiting, diarrhea, bloody stools, chest pain, shortness of breath, leg swelling, headache, changes in vision.  Her last bowel movement was yesterday and she reports she has not been passing gas since her discharge from the hospital.  She is going through about 1 pad per day and reports light vaginal bleeding, but denies any increasing pelvic or lower abdominal pain.  She denies any dysuria, urinary frequency or urgency, or flank pain. Of note, patient did have gestational diabetes, but denies preeclampsia or high blood pressure before or during pregnancy.  She denies any other complications throughout her pregnancy.  She is not breast-feeding.  She has not taken anything for her symptoms prior to arrival.    REVIEW OF SYSTEMS   Review of Systems   Constitutional: Negative for chills and fever.   Eyes: Negative for visual disturbance.   Respiratory: Negative for shortness of breath.    Cardiovascular: Negative for chest pain and leg swelling.   Gastrointestinal:  Positive for abdominal pain and constipation. Negative for blood in stool, diarrhea, nausea and vomiting.   Genitourinary: Positive for hematuria and vaginal bleeding. Negative for dysuria, flank pain, frequency and urgency.   Skin: Negative for rash.   Neurological: Negative for headaches.   Hematological: Does not bruise/bleed easily.     PAST MEDICAL HISTORY:  Past Medical History:   Diagnosis Date    Gestational diabetes mellitus 2023       PAST SURGICAL HISTORY:  Past Surgical History:   Procedure Laterality Date     SECTION N/A 2023    Procedure:  SECTION;  Surgeon: Pinky Damon DO;  Location: [facility] OR    DILATION AND CURETTAGE SUCTION N/A 2021    Procedure: DILATION AND CURETTAGE, UTERUS, USING SUCTION;  Surgeon: Pinky Damon DO;  Location: [facility] Main OR    NO HISTORY OF SURGERY             CURRENT MEDICATIONS:    ACCU-CHEK GUIDE test strip  acetaminophen (TYLENOL) 325 MG tablet  acetone urine (KETOSTIX) test strip  Alcohol Swabs PADS  blood glucose monitoring (SOFTCLIX) lancets  ibuprofen (ADVIL/MOTRIN) 800 MG tablet  oxyCODONE (ROXICODONE) 5 MG tablet  Prenatal Vit-Fe Fumarate-FA (PRENATAL MULTIVITAMIN W/IRON) 27-0.8 MG tablet        ALLERGIES:  No Known Allergies    FAMILY HISTORY:  Family History   Problem Relation Age of Onset    Cancer No family hx of     Diabetes No family hx of     Coronary Artery Disease No family hx of        SOCIAL HISTORY:   Social History     Socioeconomic History    Marital status: Single     Spouse name: Heriberto    Number of children: 0    Years of education: 15    Highest education level: Some college, no degree   Occupational History    Occupation: [employer]     Comment: Assembly line building airpline parts   Tobacco Use    Smoking status: Never    Smokeless tobacco: Never   Vaping Use    Vaping Use: Never used   Substance and Sexual Activity    Alcohol use: Never    Drug use: Never    Sexual activity: Yes      Partners: Male     Birth control/protection: None   Social History Narrative    ** Merged History Encounter **            VITALS:  /84   Pulse 81   Temp 98.8  F (37.1  C) (Temporal)   Resp 22   Ht 1.524 m (5')   Wt 95.3 kg (210 lb)   LMP 10/05/2022   SpO2 99%   Breastfeeding No   BMI 41.01 kg/m      PHYSICAL EXAM    Constitutional:  Alert, appears uncomfortable. Cooperative. Accompanied by her family member.  EYES: Conjunctivae clear.   HENT:  Atraumatic, normocephalic.  Respiratory:  Respirations even, unlabored, in no acute respiratory distress. Lungs clear to auscultation bilaterally without wheeze, rhonchi, or rales. No cough. Speaks in full sentences easily.  Cardiovascular:  Regular rate and rhythm, good peripheral perfusion. No peripheral edema. No chest wall tenderness.  GI: Soft, flat, non-distended. Bowel sounds normal. Diffuse upper abdominal tenderness to palpation with voluntary guarding, no rebound or other peritoneal signs.  incision CDI and healing well without overlying erythema, warmth, purulence, fluctuance, crepitus. Old/healing scattered ecchymosis consistent with subcutaneous heparin injections.   Musculoskeletal:  No edema. No cyanosis. Range of motion major extremities intact.    Integument: Warm, Dry. No rash to visualized skin.   Neurologic:  Alert & oriented. No focal deficits noted.   Psych: Normal mood and affect.      LAB:  All pertinent labs reviewed and interpreted.  Results for orders placed or performed during the hospital encounter of 23   CT Abdomen Pelvis w Contrast    Impression    IMPRESSION:   1.  Typical postoperative appearance of the abdomen and pelvis, no complication demonstrated.       UA with Microscopic reflex to Culture    Specimen: Urine, Midstream   Result Value Ref Range    Color Urine Orange (A) Colorless, Straw, Light Yellow, Yellow    Appearance Urine Cloudy (A) Clear    Glucose Urine Negative Negative mg/dL    Bilirubin Urine  Negative Negative    Ketones Urine Trace (A) Negative mg/dL    Specific Gravity Urine 1.011 1.001 - 1.030    Blood Urine >1.0 mg/dL (A) Negative    pH Urine 6.0 5.0 - 7.0    Protein Albumin Urine 70 (A) Negative mg/dL    Urobilinogen Urine <2.0 <2.0 mg/dL    Nitrite Urine Negative Negative    Leukocyte Esterase Urine 500 [place]/uL (A) Negative    Bacteria Urine Few (A) None Seen /HPF    Mucus Urine Present (A) None Seen /LPF    RBC Urine >182 (H) <=2 /HPF    WBC Urine >182 (H) <=5 /HPF    Squamous Epithelials Urine 3 (H) <=1 /HPF   Basic metabolic panel   Result Value Ref Range    Sodium 138 136 - 145 mmol/L    Potassium 3.3 (L) 3.4 - 5.3 mmol/L    Chloride 105 98 - 107 mmol/L    Carbon Dioxide (CO2) 22 22 - 29 mmol/L    Anion Gap 11 7 - 15 mmol/L    Urea Nitrogen 8.5 6.0 - 20.0 mg/dL    Creatinine 0.58 0.51 - 0.95 mg/dL    Calcium 8.5 (L) 8.6 - 10.0 mg/dL    Glucose 73 70 - 99 mg/dL    GFR Estimate >90 >60 mL/min/1.73m2   Hepatic function panel   Result Value Ref Range    Protein Total 6.1 (L) 6.4 - 8.3 g/dL    Albumin 3.1 (L) 3.5 - 5.2 g/dL    Bilirubin Total 0.2 <=1.2 mg/dL    Alkaline Phosphatase 126 (H) 35 - 104 U/L    AST 31 0 - 45 U/L    ALT 22 0 - 50 U/L    Bilirubin Direct <0.20 0.00 - 0.30 mg/dL   Result Value Ref Range    Lipase 22 13 - 60 U/L   CBC with platelets and differential   Result Value Ref Range    WBC Count 11.2 (H) 4.0 - 11.0 10e3/uL    RBC Count 3.14 (L) 3.80 - 5.20 10e6/uL    Hemoglobin 9.0 (L) 11.7 - 15.7 g/dL    Hematocrit 27.7 (L) 35.0 - 47.0 %    MCV 88 78 - 100 fL    MCH 28.7 26.5 - 33.0 pg    MCHC 32.5 31.5 - 36.5 g/dL    RDW 13.3 10.0 - 15.0 %    Platelet Count 201 150 - 450 10e3/uL    % Neutrophils 81 %    % Lymphocytes 12 %    % Monocytes 5 %    % Eosinophils 1 %    % Basophils 0 %    % Immature Granulocytes 1 %    NRBCs per 100 WBC 0 <1 /100    Absolute Neutrophils 9.2 (H) 1.6 - 8.3 10e3/uL    Absolute Lymphocytes 1.3 0.8 - 5.3 10e3/uL    Absolute Monocytes 0.5 0.0 - 1.3 10e3/uL     Absolute Eosinophils 0.1 0.0 - 0.7 10e3/uL    Absolute Basophils 0.0 0.0 - 0.2 10e3/uL    Absolute Immature Granulocytes 0.1 <=0.4 10e3/uL    Absolute NRBCs 0.0 10e3/uL   Extra Blue Top Tube   Result Value Ref Range    Hold Specimen JIC    Extra Red Top Tube   Result Value Ref Range    Hold Specimen JIC    Result Value Ref Range    Magnesium 1.8 1.7 - 2.3 mg/dL       RADIOLOGY:  Reviewed all pertinent imaging. Please see official radiology report.  CT Abdomen Pelvis w Contrast   Final Result   IMPRESSION:    1.  Typical postoperative appearance of the abdomen and pelvis, no complication demonstrated.              Michelle Sherman PA-C  [facility] EMERGENCY DEPARTMENT  [address] [address] [phone]      Michelle Sherman PA-C  07/23/23 1133       Michelle Sherman PA-C  07/24/23 7741

## 2023-07-23 NOTE — DISCHARGE INSTRUCTIONS
You were seen in the ER for evaluation of abdominal pain. Your workup and imaging was overall reassuring as discussed.     Rest, stay well hydrated (Pedialyte), follow BRAT diet (bananas, rice, applesauce, toast), and increase diet as tolerates. You may take ibuprofen or Tylenol as needed for pain. I recommend using stool softener or laxative (MiraLAX or Dulcolax) for constipation and staying well hydrated.     Tylenol (Acetaminophen) Discharge Instructions:  You may take 2 tablets of regular strength, over-the-counter, Tylenol (acetaminophen) every 4-6 hours as needed for pain.  Take no more than 4000 mg of Tylenol in a 24-hour period.      Avoid taking more than 1 acetaminophen-containing product at a time and be aware that many over-the-counter medications contain a combination of acetaminophen and other products.  If you are taking Tylenol in addition to a combination product please keep track of your daily acetaminophen dose to make sure you do not exceed the recommended 4000 mg.  Taking too much acetaminophen can cause permanent damage to your liver.    Ibuprofen/Naproxen Discharge Instructions:  You may take ibuprofen for pain control.  The maximum dose of (ibuprofen is 3200 mg ) in a 24-hour period.    Take this medication with food to prevent stomach irritation.  With long-term use this medication can irritate the stomach causing pain and lead to development of a stomach ulcer.  If you notice stomach pain or vomiting of coffee-ground colored vomit or blood, please be seen by a healthcare provider.  Attempt to use this medication for the shortest time possible.      Follow-up with your primary care provider or OB/GYN for reevaluation and ongoing management as scheduled, or sooner as needed.    Return to the emergency department for any new or worsening symptoms including severe abdominal pain, vomiting, vomiting blood, multiple episodes of diarrhea, bloody stools, uncontrolled fever/chills, chest pain,  shortness of breath, headache, vision changes, or any other concerning symptoms.     Take Care!  - Michelle Sherman PA-C

## 2023-07-23 NOTE — ED PROVIDER NOTES
Emergency Department Midlevel Supervisory Note     I personally saw the patient and performed a substantive portion of the visit including all aspects of the medical decision making.    ED Course:  10:09 AM Michelle Sherman PA-C staffed patient with me. I agree with their assessment and plan of management, and I will see the patient.  11:05 AM I met with the patient to introduce myself, gather additional history, perform my initial exam, and discuss the plan.     Brief HPI:     Michelle Ramirez is a 33 year old female who presents for evaluation of upper abdominal pain. The patient underwent uncomplicated full-term  on 23 after labor arrest and was discharged yesterday. Since then, patient woke up this morning drink a sip of water and had abrupt onset upper abdominal pain that has been constant and increasing in severity since. She reports feeling chilled, but denies associated fever, nausea, vomiting, diarrhea, bloody stools, chest pain, shortness of breath, leg swelling, headache, changes in vision. Her last bowel movement was yesterday and she reports she has not been passing gas since her discharge from the hospital.       I, Edwar Patel, am serving as a scribe to document services personally performed by Cas Mejia MD, based on my observations and the provider's statements to me.   I, Cas Mejia MD attest that Edwar Patel  was acting in a scribe capacity, has observed my performance of the services and has documented them in accordance with my direction.    Brief Physical Exam: /83   Pulse 79   Temp 98.8  F (37.1  C) (Temporal)   Resp 22   Ht 1.524 m (5')   Wt 95.3 kg (210 lb)   LMP 10/05/2022   SpO2 99%   Breastfeeding No   BMI 41.01 kg/m    Constitutional:  Alert, in no acute distress  EYES: Conjunctivae clear  HENT:  Atraumatic, normocephalic  Respiratory:  Respirations even, unlabored, in no acute respiratory distress  Cardiovascular:  Regular rate and rhythm, good  peripheral perfusion  GI: Soft, nondistended, nontender, no palpable masses, no rebound, no guarding   Musculoskeletal:  No edema. No cyanosis. Range of motion major extremities intact.    Integument: Warm, Dry, No erythema, No rash.   Neurologic:  Alert & oriented, no focal deficits noted  Psych: Normal mood and affect     MDM:  Patient with upper abd pain.  C section scar looks good.  No evidence on exam or history of endometrieitis.  No vaginal discharge.  CT is normal.  NO evidence of post op infection or obstruction.  Suspect much of this is constipation.  Will discharge with bowel regimen and follow up.       1. Pain of upper abdomen    2. Status post     3. Hypokalemia        Labs and Imaging:  Results for orders placed or performed during the hospital encounter of 23   CT Abdomen Pelvis w Contrast    Impression    IMPRESSION:   1.  Typical postoperative appearance of the abdomen and pelvis, no complication demonstrated.       UA with Microscopic reflex to Culture    Specimen: Urine, Midstream   Result Value Ref Range    Color Urine Orange (A) Colorless, Straw, Light Yellow, Yellow    Appearance Urine Cloudy (A) Clear    Glucose Urine Negative Negative mg/dL    Bilirubin Urine Negative Negative    Ketones Urine Trace (A) Negative mg/dL    Specific Gravity Urine 1.011 1.001 - 1.030    Blood Urine >1.0 mg/dL (A) Negative    pH Urine 6.0 5.0 - 7.0    Protein Albumin Urine 70 (A) Negative mg/dL    Urobilinogen Urine <2.0 <2.0 mg/dL    Nitrite Urine Negative Negative    Leukocyte Esterase Urine 500 [place]/uL (A) Negative    Bacteria Urine Few (A) None Seen /HPF    Mucus Urine Present (A) None Seen /LPF    RBC Urine >182 (H) <=2 /HPF    WBC Urine >182 (H) <=5 /HPF    Squamous Epithelials Urine 3 (H) <=1 /HPF   Basic metabolic panel   Result Value Ref Range    Sodium 138 136 - 145 mmol/L    Potassium 3.3 (L) 3.4 - 5.3 mmol/L    Chloride 105 98 - 107 mmol/L    Carbon Dioxide (CO2) 22 22 - 29 mmol/L     Anion Gap 11 7 - 15 mmol/L    Urea Nitrogen 8.5 6.0 - 20.0 mg/dL    Creatinine 0.58 0.51 - 0.95 mg/dL    Calcium 8.5 (L) 8.6 - 10.0 mg/dL    Glucose 73 70 - 99 mg/dL    GFR Estimate >90 >60 mL/min/1.73m2   Hepatic function panel   Result Value Ref Range    Protein Total 6.1 (L) 6.4 - 8.3 g/dL    Albumin 3.1 (L) 3.5 - 5.2 g/dL    Bilirubin Total 0.2 <=1.2 mg/dL    Alkaline Phosphatase 126 (H) 35 - 104 U/L    AST 31 0 - 45 U/L    ALT 22 0 - 50 U/L    Bilirubin Direct <0.20 0.00 - 0.30 mg/dL   Result Value Ref Range    Lipase 22 13 - 60 U/L   CBC with platelets and differential   Result Value Ref Range    WBC Count 11.2 (H) 4.0 - 11.0 10e3/uL    RBC Count 3.14 (L) 3.80 - 5.20 10e6/uL    Hemoglobin 9.0 (L) 11.7 - 15.7 g/dL    Hematocrit 27.7 (L) 35.0 - 47.0 %    MCV 88 78 - 100 fL    MCH 28.7 26.5 - 33.0 pg    MCHC 32.5 31.5 - 36.5 g/dL    RDW 13.3 10.0 - 15.0 %    Platelet Count 201 150 - 450 10e3/uL    % Neutrophils 81 %    % Lymphocytes 12 %    % Monocytes 5 %    % Eosinophils 1 %    % Basophils 0 %    % Immature Granulocytes 1 %    NRBCs per 100 WBC 0 <1 /100    Absolute Neutrophils 9.2 (H) 1.6 - 8.3 10e3/uL    Absolute Lymphocytes 1.3 0.8 - 5.3 10e3/uL    Absolute Monocytes 0.5 0.0 - 1.3 10e3/uL    Absolute Eosinophils 0.1 0.0 - 0.7 10e3/uL    Absolute Basophils 0.0 0.0 - 0.2 10e3/uL    Absolute Immature Granulocytes 0.1 <=0.4 10e3/uL    Absolute NRBCs 0.0 10e3/uL   Extra Blue Top Tube   Result Value Ref Range    Hold Specimen JIC    Extra Red Top Tube   Result Value Ref Range    Hold Specimen JIC      I have reviewed the relevant laboratory and radiology studies    Procedures:  I was present for the key portions of this procedure:     Cas Mejia MD  [facility] EMERGENCY DEPARTMENT  [address] [place] [id number] [phone]     Cas Mejia MD  08/02/23 0741

## 2023-07-24 ENCOUNTER — PATIENT OUTREACH (OUTPATIENT)
Dept: CARE COORDINATION | Facility: CLINIC | Age: 33
End: 2023-07-24
Payer: COMMERCIAL

## 2023-07-24 LAB — BACTERIA UR CULT: NORMAL

## 2023-07-24 NOTE — PROGRESS NOTES
"Clinic Care Coordination Contact  [facility]: Post-Discharge Note  SITUATION                                                      Admission:    Admission Date: 23   Reason for Admission:  Post-op Problem   on 23    Abdominal Pain  Discharge:   Discharge Date: 23  Discharge Diagnosis: 1. Pain of upper abdomen   2. Status post    3. Hypokalemia    BACKGROUND                                                      Per hospital discharge summary and inpatient provider notes:    Michelle Ramirez is a 33 year old female with a pertinent history of gestational diabetes who presents to this ED for evaluation of upper abdominal pain. Per chart review, patient underwent uncomplicated full-term  on 23 after labor arrest and was discharged yesterday.  Since then, patient woke up this morning drink a sip of water and had abrupt onset upper abdominal pain that has been constant and increasing in severity since.  She reports feeling chilled, but denies associated fever, nausea, vomiting, diarrhea, bloody stools, chest pain, shortness of breath, leg swelling, headache, changes in vision.  Her last bowel movement was yesterday and she reports she has not been passing gas since her discharge from the hospital.  She is going through about 1 pad per day and reports light vaginal bleeding, but denies any increasing pelvic or lower abdominal pain.  She denies any dysuria, urinary frequency or urgency, or flank pain. Of note, patient did have gestational diabetes, but denies preeclampsia or high blood pressure before or during pregnancy.  She denies any other complications throughout her pregnancy.  She is not breast-feeding.  She has not taken anything for her symptoms prior to arrival.     ASSESSMENT           Discharge Assessment  How are you doing now that you are home?: Pt states \"feeling okay today; I'm currently taking the medication prescribed to me and now doing okay, ibuprofen, " "Tylenol and oxycodone.\"  Has not had a BM since the day she was discharged from the hospital 23, and just started passing gas today.  Has not restarted checking BG since discharge, will resume monitoring going forward.  Pt reports has not had hypoglycemia but knows to \"walk if my BG is high.\"  RN reviewed s/sx of hypoglycemia and how to treat if sx occur, and encouraged checking BG regularly and/or as directed and keep BG log to bring to appts.  Pt reported difficulty voiding sitting down \"I can only pee standing up.\"  RN advised pt contact OB today to discuss symptoms; pt will call today to schedule 2 week follow up appt and discuss symptoms.  How are your symptoms? (Red Flag symptoms escalate to triage hotline per guidelines): Improved  Do you feel your condition is stable enough to be safe at home until your provider visit?: Yes  Does the patient have their discharge instructions? : Yes  Does the patient have questions regarding their discharge instructions? : No  Were you started on any new medications or were there changes to any of your previous medications? : Yes  Does the patient have all of their medications?: Yes  Do you have questions regarding any of your medications? : No  Do you have all of your needed medical supplies or equipment (DME)?  (i.e. oxygen tank, CPAP, cane, etc.): Yes (diabetic supplies/glucometer)  Discharge follow-up appointment scheduled within 14 calendar days? : No  Is patient agreeable to assistance with scheduling? : No (Pt will call to schedule follow up appt today)         Post-op (Clinicians Only)  Did the patient have surgery or a procedure: Yes (s/p  on 23)  Incision: closed;healing ( has been monitoring)  Drainage: No  Bleeding: none  Fever: No  Chills: No  Redness: No  Warmth: No  Swelling: No  Incision site pain: Yes  Eating & Drinking: eating and drinking without complaints/concerns  PO Intake: regular diet  Additional Symptoms:  (Denies)  Bowel " "Function: constipation (Started Colace and Miralax last night and again today.  RN advised continuing to take both as directed to help with constipation.)  Date of last BM: 07/22/23  Urinary Status:  (Reports \"I can pee but can't sit down, only when standing,\" this started after discharge home, \"no pain during urination, maybe muscles not as relaxed.\" RN advised contacting OB regarding sx.)      PLAN                                                      Outpatient Plan:      Follow-up with your primary care provider or OB/GYN for reevaluation and ongoing management as scheduled, or sooner as needed.    Per AVS from 7/22/23 discharge, Follow up with  Dr. Damon, in 2wks     No future appointments.      For any urgent concerns, please contact our 24 hour nurse triage line: [phone] ([phone])         Michelle Hall RN              "

## 2023-08-30 ENCOUNTER — LAB REQUISITION (OUTPATIENT)
Dept: LAB | Facility: CLINIC | Age: 33
End: 2023-08-30

## 2023-08-30 DIAGNOSIS — Z12.4 ENCOUNTER FOR SCREENING FOR MALIGNANT NEOPLASM OF CERVIX: ICD-10-CM

## 2023-08-30 PROCEDURE — 87624 HPV HI-RISK TYP POOLED RSLT: CPT | Performed by: OBSTETRICS & GYNECOLOGY

## 2023-08-30 PROCEDURE — G0145 SCR C/V CYTO,THINLAYER,RESCR: HCPCS | Performed by: OBSTETRICS & GYNECOLOGY

## 2023-09-05 LAB
BKR LAB AP GYN ADEQUACY: NORMAL
BKR LAB AP GYN INTERPRETATION: NORMAL
BKR LAB AP HPV REFLEX: NORMAL
BKR LAB AP LMP: NORMAL
BKR LAB AP PREVIOUS ABNL DX: NORMAL
BKR LAB AP PREVIOUS ABNORMAL: NORMAL
PATH REPORT.COMMENTS IMP SPEC: NORMAL
PATH REPORT.COMMENTS IMP SPEC: NORMAL
PATH REPORT.RELEVANT HX SPEC: NORMAL

## 2023-09-06 LAB
HUMAN PAPILLOMA VIRUS 16 DNA: NEGATIVE
HUMAN PAPILLOMA VIRUS 18 DNA: NEGATIVE
HUMAN PAPILLOMA VIRUS FINAL DIAGNOSIS: NORMAL
HUMAN PAPILLOMA VIRUS OTHER HR: NEGATIVE

## 2023-09-26 ENCOUNTER — OFFICE VISIT (OUTPATIENT)
Dept: FAMILY MEDICINE | Facility: CLINIC | Age: 33
End: 2023-09-26
Payer: COMMERCIAL

## 2023-09-26 VITALS
WEIGHT: 200 LBS | RESPIRATION RATE: 18 BRPM | TEMPERATURE: 97.6 F | BODY MASS INDEX: 39.06 KG/M2 | HEART RATE: 85 BPM | OXYGEN SATURATION: 98 % | DIASTOLIC BLOOD PRESSURE: 85 MMHG | SYSTOLIC BLOOD PRESSURE: 128 MMHG

## 2023-09-26 DIAGNOSIS — Z71.1 FEARED COMPLAINT WITHOUT DIAGNOSIS: Primary | ICD-10-CM

## 2023-09-26 PROCEDURE — 99214 OFFICE O/P EST MOD 30 MIN: CPT | Performed by: PHYSICIAN ASSISTANT

## 2023-09-27 NOTE — PROGRESS NOTES
"Patient presents with:  Mass: Under  scar on left side x1 week          Clinical Decision Making:  I had a conversation with the patient stating that I do think that this is the healing ridge of the surgical incision site with the absorbable sutures.  I did not palpate a surgical incision hernia.  It was nontender to palpation.  Patient is advised to watch and monitor the area and have resolved.  Discussion regarding ultrasound for imaging of the area with indications and consideration.  Vies patient that at this time I was not concerned for incisional herniation.  Continue to monitor symptoms with the healing should resolve.  Follow-up with primary care provider to reevaluate symptoms with indication for emergent return gone over.  Questions were answered to patient's satisfaction before discharge.    30 min spent on the date of the encounter in chart review, patient visit, review of tests, documentation and/ordiscussion with other providers about the issues documented above.  Extra time was taken to go over the history and physical examination answering questions with the patient with .          ICD-10-CM    1. Feared complaint without diagnosis  Z71.1         HPI:  Michelle Ramirez is a 33 year old female who presents today for evaluation of a swelling at the surgical incision site at the .  She is 2 months status post  on 2023.  Patient is concerned that she can feel \"a lump\" at the surgical incision site.  At this time she has not had pain, increased flatus vomiting diarrhea or obstipation.  Shares that she has been moving her bowels normally.  Has not had fever chills or night sweats no reactivity at the surgical incision site.  No treatments tried for this at home    History obtained from chart review and the patient.    Problem List:  2023: Gestational diabetes  2023: Pregnancy  2023: Encounter for triage in pregnant patient  2023: Gestational diabetes " mellitus  2021-09: BMI 38.0-38.9,adult  2021-09: First pregnancy, unspecified trimester  2021-09: Vaginal bleeding before 22 weeks gestation  2021-09: Supervision of high risk pregnancy, antepartum      Past Medical History:   Diagnosis Date    Gestational diabetes mellitus 1/11/2023       Social History     Tobacco Use    Smoking status: Never    Smokeless tobacco: Never   Substance Use Topics    Alcohol use: Never       Review of Systems  As above in HPI otherwise negative.    Vitals:    09/26/23 1715   BP: 128/85   BP Location: Right arm   Patient Position: Sitting   Cuff Size: Adult Large   Pulse: 85   Resp: 18   Temp: 97.6  F (36.4  C)   TempSrc: Oral   SpO2: 98%   Weight: 90.7 kg (200 lb)       General: Patient is resting comfortably no acute distress is afebrile  HEENT: Head is normocephalic atraumatic   eyes are PERRL EOMI sclera anicteric   Abdomen: Soft nontender nondistended no rebound or guarding or masses normal active bowel sounds x4.  Surgical incision site over the pelvic area shows that there is no reactivity.  Palpation reveals a healing ridge and at the end of the healing ridge on the left lower quadrant of the abdomen there is a small 5 mm bump.  This is not associated with abdominal wall herniation.  It is nontender to palpation.  Skin: Without rash non-diaphoretic    Physical Exam    At the end of the encounter, I discussed results, diagnosis, medications. Discussed red flags for immediate return to clinic/ER, as well as indications for follow up if no improvement. Patient understood and agreed to plan. Patient was stable for discharge.

## 2023-12-18 ENCOUNTER — OFFICE VISIT (OUTPATIENT)
Dept: FAMILY MEDICINE | Facility: CLINIC | Age: 33
End: 2023-12-18
Payer: COMMERCIAL

## 2023-12-18 VITALS
OXYGEN SATURATION: 97 % | TEMPERATURE: 98.1 F | RESPIRATION RATE: 18 BRPM | SYSTOLIC BLOOD PRESSURE: 136 MMHG | HEART RATE: 90 BPM | DIASTOLIC BLOOD PRESSURE: 89 MMHG

## 2023-12-18 DIAGNOSIS — O24.410 DIET CONTROLLED GESTATIONAL DIABETES MELLITUS (GDM), ANTEPARTUM: ICD-10-CM

## 2023-12-18 DIAGNOSIS — G56.03 BILATERAL CARPAL TUNNEL SYNDROME: Primary | ICD-10-CM

## 2023-12-18 DIAGNOSIS — R20.2 TINGLING IN EXTREMITIES: ICD-10-CM

## 2023-12-18 LAB
HBA1C MFR BLD: 5.6 % (ref 0–5.6)
HGB BLD-MCNC: 13.1 G/DL (ref 11.7–15.7)

## 2023-12-18 PROCEDURE — 99213 OFFICE O/P EST LOW 20 MIN: CPT | Performed by: PHYSICIAN ASSISTANT

## 2023-12-18 PROCEDURE — 36415 COLL VENOUS BLD VENIPUNCTURE: CPT | Performed by: PHYSICIAN ASSISTANT

## 2023-12-18 PROCEDURE — 85018 HEMOGLOBIN: CPT | Performed by: PHYSICIAN ASSISTANT

## 2023-12-18 PROCEDURE — 83036 HEMOGLOBIN GLYCOSYLATED A1C: CPT | Performed by: PHYSICIAN ASSISTANT

## 2023-12-18 RX ORDER — NAPROXEN 500 MG/1
500 TABLET ORAL 2 TIMES DAILY WITH MEALS
Qty: 20 TABLET | Refills: 0 | Status: ON HOLD | OUTPATIENT
Start: 2023-12-18 | End: 2024-03-25

## 2023-12-18 NOTE — PROGRESS NOTES
Assessment & Plan:      1. Bilateral carpal tunnel syndrome  Naproxen, bilateral wrist braces given to wear while working   Follow up with pcp  - naproxen (NAPROSYN) 500 MG tablet; Take 1 tablet (500 mg) by mouth 2 times daily (with meals)  Dispense: 20 tablet; Refill: 0  - Wrist/Arm Supplies Order Wrist Brace; Bilateral; non-thumb spica    2. Diet controlled gestational diabetes mellitus (GDM), antepartum  improved  - Hemoglobin A1c; Future  - Hemoglobin A1c    3. Tingling in extremities  Hgb wnl.  - Hemoglobin; Future  - Hemoglobin      At the end of the encounter, I discussed results, diagnosis, medications. Discussed red flags for immediate return to clinic/ER, as well as indications for follow up if no improvement. Patient understood and agreed to plan. Patient was stable for discharge.        Jody Richards PA-C on 12/18/2023 at 5:09 PM          Subjective:     HPI:    Michelle Jeffries is a 33 year old female who presents to clinic today for the following health issues:  Chief Complaint   Patient presents with    Hand Problem       Has tingling in fingers for 2 weeks  rt hand first now both hands     HPI      History obtained from the patient.    Bilateral hand finger tingling x 2 weeks.  Tingling located right hand entire hand except for her pinky finger and left hand is located only fingers 2-4.  The tingling is constant and unchanged over the past 2 weeks.  Denies injury, denies agrivating or alleviating factors.  She works in assembly doing repetitive movements.   She is 5 months post partum, had gestational diabetes and has not had blood work since her delivery    Review of Systems   ROS: 10 point ROS neg other than the symptoms noted above in the HPI.     Patient Active Problem List   Diagnosis    Gestational diabetes mellitus    Encounter for triage in pregnant patient    Pregnancy    Gestational diabetes        Past Medical History:   Diagnosis Date    Gestational diabetes mellitus 1/11/2023       Social  History     Tobacco Use    Smoking status: Never    Smokeless tobacco: Never   Substance Use Topics    Alcohol use: Never             Objective:     Vitals:    12/18/23 1624   BP: 136/89   Pulse: 90   Resp: 18   Temp: 98.1  F (36.7  C)   TempSrc: Oral   SpO2: 97%         Physical Exam   EXAM:   Pleasant, alert, appropriate appearance. NAD.  Head Exam: Normocephalic, atraumatic.  Eye Exam:  PERRLA, EOMI, non icteric/injection.    Chest/Respiratory Exam: CTAB.  Cardiovascular Exam: RRR. No murmur or rubs.  ABD: soft, Non-tender, normal bowel sounds, no rebound/guarding.  No masses/organomegaly.  Ext/musculoskeletal: Grossly intact, No edema, sensation is intact bilaterally  Neuro: CN II-XII intact grossly intact.  Skin: no rash or lesion.      Results:  No results found for any visits on 12/18/23.

## 2024-03-09 ENCOUNTER — HEALTH MAINTENANCE LETTER (OUTPATIENT)
Age: 34
End: 2024-03-09

## 2024-03-14 ENCOUNTER — LAB REQUISITION (OUTPATIENT)
Dept: LAB | Facility: CLINIC | Age: 34
End: 2024-03-14

## 2024-03-14 DIAGNOSIS — O36.80X9 PREGNANCY WITH INCONCLUSIVE FETAL VIABILITY, OTHER FETUS: ICD-10-CM

## 2024-03-14 LAB — HCG INTACT+B SERPL-ACNC: 7149 MIU/ML

## 2024-03-14 PROCEDURE — 84702 CHORIONIC GONADOTROPIN TEST: CPT | Performed by: NURSE PRACTITIONER

## 2024-03-18 ENCOUNTER — LAB REQUISITION (OUTPATIENT)
Dept: LAB | Facility: CLINIC | Age: 34
End: 2024-03-18

## 2024-03-18 DIAGNOSIS — O36.80X9 PREGNANCY WITH INCONCLUSIVE FETAL VIABILITY, OTHER FETUS: ICD-10-CM

## 2024-03-18 LAB — HCG INTACT+B SERPL-ACNC: ABNORMAL MIU/ML

## 2024-03-18 PROCEDURE — 84702 CHORIONIC GONADOTROPIN TEST: CPT | Performed by: NURSE PRACTITIONER

## 2024-03-21 ENCOUNTER — APPOINTMENT (OUTPATIENT)
Dept: INTERPRETER SERVICES | Facility: CLINIC | Age: 34
End: 2024-03-21
Payer: COMMERCIAL

## 2024-03-25 ENCOUNTER — HOSPITAL ENCOUNTER (OUTPATIENT)
Facility: CLINIC | Age: 34
Discharge: HOME | End: 2024-03-25
Attending: OBSTETRICS & GYNECOLOGY | Admitting: OBSTETRICS & GYNECOLOGY
Payer: COMMERCIAL

## 2024-03-25 ENCOUNTER — ANESTHESIA EVENT (OUTPATIENT)
Dept: SURGERY | Facility: CLINIC | Age: 34
End: 2024-03-25
Payer: COMMERCIAL

## 2024-03-25 ENCOUNTER — ANESTHESIA (OUTPATIENT)
Dept: SURGERY | Facility: CLINIC | Age: 34
End: 2024-03-25
Payer: COMMERCIAL

## 2024-03-25 VITALS
HEART RATE: 97 BPM | TEMPERATURE: 98.8 F | OXYGEN SATURATION: 94 % | RESPIRATION RATE: 20 BRPM | SYSTOLIC BLOOD PRESSURE: 119 MMHG | HEIGHT: 60 IN | WEIGHT: 237 LBS | BODY MASS INDEX: 46.53 KG/M2 | DIASTOLIC BLOOD PRESSURE: 67 MMHG

## 2024-03-25 DIAGNOSIS — O02.1 MISSED ABORTION: Primary | ICD-10-CM

## 2024-03-25 LAB
GLUCOSE BLDC GLUCOMTR-MCNC: 88 MG/DL (ref 70–99)
HGB BLD-MCNC: 14.3 G/DL (ref 11.7–15.7)

## 2024-03-25 PROCEDURE — 710N000012 HC RECOVERY PHASE 2, PER MINUTE: Performed by: OBSTETRICS & GYNECOLOGY

## 2024-03-25 PROCEDURE — 360N000075 HC SURGERY LEVEL 2, PER MIN: Performed by: OBSTETRICS & GYNECOLOGY

## 2024-03-25 PROCEDURE — 258N000003 HC RX IP 258 OP 636: Performed by: ANESTHESIOLOGY

## 2024-03-25 PROCEDURE — 370N000017 HC ANESTHESIA TECHNICAL FEE, PER MIN: Performed by: OBSTETRICS & GYNECOLOGY

## 2024-03-25 PROCEDURE — 250N000009 HC RX 250: Performed by: NURSE ANESTHETIST, CERTIFIED REGISTERED

## 2024-03-25 PROCEDURE — 82962 GLUCOSE BLOOD TEST: CPT

## 2024-03-25 PROCEDURE — 85018 HEMOGLOBIN: CPT | Performed by: PHYSICIAN ASSISTANT

## 2024-03-25 PROCEDURE — 250N000011 HC RX IP 250 OP 636: Performed by: NURSE ANESTHETIST, CERTIFIED REGISTERED

## 2024-03-25 PROCEDURE — 272N000001 HC OR GENERAL SUPPLY STERILE: Performed by: OBSTETRICS & GYNECOLOGY

## 2024-03-25 PROCEDURE — 250N000011 HC RX IP 250 OP 636: Performed by: ANESTHESIOLOGY

## 2024-03-25 PROCEDURE — 88262 CHROMOSOME ANALYSIS 15-20: CPT | Performed by: OBSTETRICS & GYNECOLOGY

## 2024-03-25 PROCEDURE — 88305 TISSUE EXAM BY PATHOLOGIST: CPT | Mod: 26 | Performed by: PATHOLOGY

## 2024-03-25 PROCEDURE — 999N000141 HC STATISTIC PRE-PROCEDURE NURSING ASSESSMENT: Performed by: OBSTETRICS & GYNECOLOGY

## 2024-03-25 PROCEDURE — 250N000013 HC RX MED GY IP 250 OP 250 PS 637: Performed by: PHYSICIAN ASSISTANT

## 2024-03-25 PROCEDURE — 250N000009 HC RX 250: Performed by: ANESTHESIOLOGY

## 2024-03-25 PROCEDURE — 88305 TISSUE EXAM BY PATHOLOGIST: CPT | Mod: TC | Performed by: OBSTETRICS & GYNECOLOGY

## 2024-03-25 PROCEDURE — 88291 CYTO/MOLECULAR REPORT: CPT | Performed by: MEDICAL GENETICS

## 2024-03-25 PROCEDURE — 36415 COLL VENOUS BLD VENIPUNCTURE: CPT | Performed by: PHYSICIAN ASSISTANT

## 2024-03-25 RX ORDER — PROPOFOL 10 MG/ML
INJECTION, EMULSION INTRAVENOUS CONTINUOUS PRN
Status: DISCONTINUED | OUTPATIENT
Start: 2024-03-25 | End: 2024-03-25

## 2024-03-25 RX ORDER — DOXYCYCLINE 100 MG/1
200 CAPSULE ORAL ONCE
Qty: 2 CAPSULE | Refills: 0 | Status: COMPLETED | OUTPATIENT
Start: 2024-03-25 | End: 2024-03-25

## 2024-03-25 RX ORDER — HYDROMORPHONE HCL IN WATER/PF 6 MG/30 ML
0.4 PATIENT CONTROLLED ANALGESIA SYRINGE INTRAVENOUS EVERY 5 MIN PRN
Status: DISCONTINUED | OUTPATIENT
Start: 2024-03-25 | End: 2024-03-25 | Stop reason: HOSPADM

## 2024-03-25 RX ORDER — KETAMINE HYDROCHLORIDE 10 MG/ML
INJECTION INTRAMUSCULAR; INTRAVENOUS PRN
Status: DISCONTINUED | OUTPATIENT
Start: 2024-03-25 | End: 2024-03-25

## 2024-03-25 RX ORDER — HYDROMORPHONE HCL IN WATER/PF 6 MG/30 ML
0.2 PATIENT CONTROLLED ANALGESIA SYRINGE INTRAVENOUS EVERY 5 MIN PRN
Status: DISCONTINUED | OUTPATIENT
Start: 2024-03-25 | End: 2024-03-25 | Stop reason: HOSPADM

## 2024-03-25 RX ORDER — SODIUM CHLORIDE, SODIUM LACTATE, POTASSIUM CHLORIDE, CALCIUM CHLORIDE 600; 310; 30; 20 MG/100ML; MG/100ML; MG/100ML; MG/100ML
INJECTION, SOLUTION INTRAVENOUS CONTINUOUS
Status: DISCONTINUED | OUTPATIENT
Start: 2024-03-25 | End: 2024-03-25 | Stop reason: HOSPADM

## 2024-03-25 RX ORDER — LIDOCAINE 40 MG/G
CREAM TOPICAL
Status: DISCONTINUED | OUTPATIENT
Start: 2024-03-25 | End: 2024-03-25 | Stop reason: HOSPADM

## 2024-03-25 RX ORDER — SCOLOPAMINE TRANSDERMAL SYSTEM 1 MG/1
1 PATCH, EXTENDED RELEASE TRANSDERMAL ONCE
Status: DISCONTINUED | OUTPATIENT
Start: 2024-03-25 | End: 2024-03-25 | Stop reason: HOSPADM

## 2024-03-25 RX ORDER — LIDOCAINE HYDROCHLORIDE 10 MG/ML
INJECTION, SOLUTION EPIDURAL; INFILTRATION; INTRACAUDAL; PERINEURAL
Status: DISCONTINUED
Start: 2024-03-25 | End: 2024-03-25 | Stop reason: WASHOUT

## 2024-03-25 RX ORDER — IBUPROFEN 800 MG/1
800 TABLET, FILM COATED ORAL EVERY 6 HOURS PRN
Qty: 30 TABLET | Refills: 0 | Status: SHIPPED | OUTPATIENT
Start: 2024-03-25

## 2024-03-25 RX ORDER — OXYCODONE HYDROCHLORIDE 5 MG/1
10 TABLET ORAL EVERY 4 HOURS PRN
Status: DISCONTINUED | OUTPATIENT
Start: 2024-03-25 | End: 2024-03-25 | Stop reason: HOSPADM

## 2024-03-25 RX ORDER — OXYCODONE HYDROCHLORIDE 5 MG/1
5 TABLET ORAL EVERY 4 HOURS PRN
Status: DISCONTINUED | OUTPATIENT
Start: 2024-03-25 | End: 2024-03-25 | Stop reason: HOSPADM

## 2024-03-25 RX ORDER — IBUPROFEN 400 MG/1
800 TABLET, FILM COATED ORAL ONCE
Qty: 2 TABLET | Refills: 0 | Status: DISCONTINUED | OUTPATIENT
Start: 2024-03-25 | End: 2024-03-25 | Stop reason: HOSPADM

## 2024-03-25 RX ORDER — NALOXONE HYDROCHLORIDE 0.4 MG/ML
0.1 INJECTION, SOLUTION INTRAMUSCULAR; INTRAVENOUS; SUBCUTANEOUS
Status: DISCONTINUED | OUTPATIENT
Start: 2024-03-25 | End: 2024-03-25 | Stop reason: HOSPADM

## 2024-03-25 RX ORDER — DEXAMETHASONE SODIUM PHOSPHATE 10 MG/ML
INJECTION, SOLUTION INTRAMUSCULAR; INTRAVENOUS PRN
Status: DISCONTINUED | OUTPATIENT
Start: 2024-03-25 | End: 2024-03-25

## 2024-03-25 RX ORDER — FENTANYL CITRATE 50 UG/ML
25 INJECTION, SOLUTION INTRAMUSCULAR; INTRAVENOUS
Status: DISCONTINUED | OUTPATIENT
Start: 2024-03-25 | End: 2024-03-25 | Stop reason: HOSPADM

## 2024-03-25 RX ORDER — FENTANYL CITRATE 50 UG/ML
25 INJECTION, SOLUTION INTRAMUSCULAR; INTRAVENOUS EVERY 5 MIN PRN
Status: DISCONTINUED | OUTPATIENT
Start: 2024-03-25 | End: 2024-03-25 | Stop reason: HOSPADM

## 2024-03-25 RX ORDER — ONDANSETRON 4 MG/1
4 TABLET, ORALLY DISINTEGRATING ORAL EVERY 30 MIN PRN
Status: DISCONTINUED | OUTPATIENT
Start: 2024-03-25 | End: 2024-03-25 | Stop reason: HOSPADM

## 2024-03-25 RX ORDER — DEXMEDETOMIDINE HYDROCHLORIDE 4 UG/ML
INJECTION, SOLUTION INTRAVENOUS PRN
Status: DISCONTINUED | OUTPATIENT
Start: 2024-03-25 | End: 2024-03-25

## 2024-03-25 RX ORDER — FENTANYL CITRATE 50 UG/ML
50 INJECTION, SOLUTION INTRAMUSCULAR; INTRAVENOUS EVERY 5 MIN PRN
Status: DISCONTINUED | OUTPATIENT
Start: 2024-03-25 | End: 2024-03-25 | Stop reason: HOSPADM

## 2024-03-25 RX ORDER — ONDANSETRON 2 MG/ML
4 INJECTION INTRAMUSCULAR; INTRAVENOUS EVERY 30 MIN PRN
Status: DISCONTINUED | OUTPATIENT
Start: 2024-03-25 | End: 2024-03-25 | Stop reason: HOSPADM

## 2024-03-25 RX ORDER — ACETAMINOPHEN 325 MG/1
975 TABLET ORAL ONCE
Qty: 3 TABLET | Refills: 0 | Status: DISCONTINUED | OUTPATIENT
Start: 2024-03-25 | End: 2024-03-25 | Stop reason: HOSPADM

## 2024-03-25 RX ORDER — ACETAMINOPHEN 325 MG/1
975 TABLET ORAL ONCE
Status: COMPLETED | OUTPATIENT
Start: 2024-03-25 | End: 2024-03-25

## 2024-03-25 RX ORDER — ONDANSETRON 2 MG/ML
INJECTION INTRAMUSCULAR; INTRAVENOUS PRN
Status: DISCONTINUED | OUTPATIENT
Start: 2024-03-25 | End: 2024-03-25

## 2024-03-25 RX ORDER — GLYCOPYRROLATE 0.2 MG/ML
INJECTION, SOLUTION INTRAMUSCULAR; INTRAVENOUS PRN
Status: DISCONTINUED | OUTPATIENT
Start: 2024-03-25 | End: 2024-03-25

## 2024-03-25 RX ADMIN — GLYCOPYRROLATE 0.2 MG: 0.2 INJECTION INTRAMUSCULAR; INTRAVENOUS at 14:05

## 2024-03-25 RX ADMIN — ONDANSETRON 4 MG: 2 INJECTION INTRAMUSCULAR; INTRAVENOUS at 14:26

## 2024-03-25 RX ADMIN — KETAMINE HYDROCHLORIDE 30 MG: 10 INJECTION INTRAMUSCULAR; INTRAVENOUS at 14:23

## 2024-03-25 RX ADMIN — SODIUM CHLORIDE, POTASSIUM CHLORIDE, SODIUM LACTATE AND CALCIUM CHLORIDE: 600; 310; 30; 20 INJECTION, SOLUTION INTRAVENOUS at 14:37

## 2024-03-25 RX ADMIN — SODIUM CHLORIDE, POTASSIUM CHLORIDE, SODIUM LACTATE AND CALCIUM CHLORIDE: 600; 310; 30; 20 INJECTION, SOLUTION INTRAVENOUS at 12:25

## 2024-03-25 RX ADMIN — DOXYCYCLINE HYCLATE 200 MG: 100 CAPSULE ORAL at 11:55

## 2024-03-25 RX ADMIN — SCOPALAMINE 1 PATCH: 1 PATCH, EXTENDED RELEASE TRANSDERMAL at 12:57

## 2024-03-25 RX ADMIN — KETAMINE HYDROCHLORIDE 20 MG: 10 INJECTION INTRAMUSCULAR; INTRAVENOUS at 14:03

## 2024-03-25 RX ADMIN — DEXMEDETOMIDINE 12 MCG: 100 INJECTION, SOLUTION, CONCENTRATE INTRAVENOUS at 14:08

## 2024-03-25 RX ADMIN — PROPOFOL 200 MCG/KG/MIN: 10 INJECTION, EMULSION INTRAVENOUS at 14:06

## 2024-03-25 RX ADMIN — ACETAMINOPHEN 975 MG: 325 TABLET ORAL at 11:53

## 2024-03-25 RX ADMIN — FENTANYL CITRATE 25 MCG: 50 INJECTION, SOLUTION INTRAMUSCULAR; INTRAVENOUS at 14:58

## 2024-03-25 RX ADMIN — DEXAMETHASONE SODIUM PHOSPHATE 10 MG: 10 INJECTION, SOLUTION INTRAMUSCULAR; INTRAVENOUS at 14:06

## 2024-03-25 RX ADMIN — MIDAZOLAM 2 MG: 1 INJECTION INTRAMUSCULAR; INTRAVENOUS at 14:03

## 2024-03-25 ASSESSMENT — ACTIVITIES OF DAILY LIVING (ADL)
ADLS_ACUITY_SCORE: 31
ADLS_ACUITY_SCORE: 29
ADLS_ACUITY_SCORE: 31
ADLS_ACUITY_SCORE: 31

## 2024-03-25 NOTE — ANESTHESIA POSTPROCEDURE EVALUATION
Patient: Michelle Ramirez    Procedure: Procedure(s):  SUCTION DILATION AND CURETTAGE       Anesthesia Type:  MAC    Note:  Disposition: Outpatient   Postop Pain Control: Uneventful            Sign Out: Well controlled pain   PONV: No   Neuro/Psych: Uneventful            Sign Out: Acceptable/Baseline neuro status   Airway/Respiratory: Uneventful            Sign Out: Acceptable/Baseline resp. status   CV/Hemodynamics: Uneventful            Sign Out: Acceptable CV status; No obvious hypovolemia; No obvious fluid overload   Other NRE: NONE   DID A NON-ROUTINE EVENT OCCUR? No           Last vitals:  Vitals Value Taken Time   /69 03/25/24 1535   Temp 36.2  C (97.2  F) 03/25/24 1447   Pulse 97 03/25/24 1600   Resp 30 03/25/24 1523   SpO2 94 % 03/25/24 1600   Vitals shown include unfiled device data.    Electronically Signed By: LANDY SCHAEFER MD  March 25, 2024  4:01 PM

## 2024-03-25 NOTE — H&P
[facility]  HISTORY AND PHYSICAL EXAM      NAME:Michelle Ramirez  : 1990   MRN: [id number]   GESTATIONAL AGE: Unknown    ADMISSION DATE: 3/25/2024     PCP:  [facility]       Pregnancy History: This is a 33yo  with findings consistent with a missed  / blighted ovum    OBSTETRIC HISTORY:    OB History    Para Term  AB Living   4 1 1 0 2 1   SAB IAB Ectopic Multiple Live Births   2 0 0 0 1      # Outcome Date GA Lbr Bossman/2nd Weight Sex Delivery Anes PTL Lv   4 Current            3 Term 23 40w2d  3.66 kg (8 lb 1.1 oz) M CS-LTranv IV N VANESSA      Name: ESVIN RAMIREZ-MICHELLE BELL      Apgar1: 9  Apgar5: 9   2 SAB 09/10/21 5w0d    SAB         Birth Comments: D&C was done 21 due to retained products of coception.   1 SAB                EDC: Estimated Date of Delivery: unknown      Prenatal Complications   Patient Active Problem List   Diagnosis    Gestational diabetes mellitus    Encounter for triage in pregnant patient    Pregnancy    Gestational diabetes       Exam:      /56   Pulse 85   Temp 98.3  F (36.8  C) (Temporal)   Resp 16   Ht 1.524 m (5')   Wt 107.5 kg (237 lb)   LMP 2023 (Approximate)   SpO2 97%   BMI 46.29 kg/m      HEENT  negative  Heart     S1, S2 normal, no murmur, click, rub or gallop, regular rate and rhythm, chest is clear without rales or wheezing  Lungs    Clear to auscultation, no wheezes or crackles, normal breath sounds  Abdomen   Abdomen soft, non-tender. BS normal. No masses, organomegaly  Extremities  Normal, Warm, No cyanosis, no clubbing, No edema, and nontender    Vaginal exam: deferred    Assessment: 33yo  with a missed  / blighted ovum       Plan: Plan to proceed with a suction D and C  Patient requesting chromosomal testing as this is 3 miscarriage      Pinky Damon DO on 3/25/2024 at 1:36 PM

## 2024-03-25 NOTE — ANESTHESIA CARE TRANSFER NOTE
Patient: Michelle Ramirez    Procedure: Procedure(s):  SUCTION DILATION AND CURETTAGE       Diagnosis: Blighted ovum [O02.0]  Diagnosis Additional Information: No value filed.    Anesthesia Type:   MAC     Note:    Oropharynx: oropharynx clear of all foreign objects and spontaneously breathing  Level of Consciousness: awake  Oxygen Supplementation: face mask  Level of Supplemental Oxygen (L/min / FiO2): 8  Independent Airway: airway patency satisfactory and stable  Dentition: dentition unchanged  Vital Signs Stable: post-procedure vital signs reviewed and stable  Report to RN Given: handoff report given  Patient transferred to: Phase II    Handoff Report: Identifed the Patient, Identified the Reponsible Provider, Reviewed the pertinent medical history, Discussed the surgical course, Reviewed Intra-OP anesthesia mangement and issues during anesthesia, Set expectations for post-procedure period and Allowed opportunity for questions and acknowledgement of understanding      Vitals:  Vitals Value Taken Time   /58 03/25/24 1447   Temp 36.2  C (97.2  F) 03/25/24 1447   Pulse 109 03/25/24 1450   Resp 30 03/25/24 1450   SpO2 100 % 03/25/24 1450   Vitals shown include unfiled device data.    Electronically Signed By: KAILA Christina CRNA  March 25, 2024  2:51 PM

## 2024-03-25 NOTE — ANESTHESIA PREPROCEDURE EVALUATION
Anesthesia Pre-Procedure Evaluation    Patient: Michelle Ramirez   MRN: [id number] : 1990        Procedure : Procedure(s):  SUCTION DILATION AND CURETTAGE          Past Medical History:   Diagnosis Date    Gestational diabetes mellitus 2023    Motion sickness       Past Surgical History:   Procedure Laterality Date     SECTION N/A 2023    Procedure:  SECTION;  Surgeon: Pinky Damon DO;  Location: [facility] OR    DILATION AND CURETTAGE SUCTION N/A 2021    Procedure: DILATION AND CURETTAGE, UTERUS, USING SUCTION;  Surgeon: Pinky Damon DO;  Location: [facility] Main OR    NO HISTORY OF SURGERY        No Known Allergies   Social History     Tobacco Use    Smoking status: Never    Smokeless tobacco: Never   Substance Use Topics    Alcohol use: Never      Wt Readings from Last 1 Encounters:   24 107.5 kg (237 lb)        Anesthesia Evaluation   Pt has had prior anesthetic.     No history of anesthetic complications       ROS/MED HX  ENT/Pulmonary:  - neg pulmonary ROS     Neurologic:  - neg neurologic ROS     Cardiovascular:  - neg cardiovascular ROS     METS/Exercise Tolerance:     Hematologic:       Musculoskeletal:  - neg musculoskeletal ROS     GI/Hepatic:  - neg GI/hepatic ROS     Renal/Genitourinary:  - neg Renal ROS     Endo:     (+)               Obesity,       Psychiatric/Substance Use:       Infectious Disease:       Malignancy:       Other:            Physical Exam    Airway        Mallampati: III       Respiratory Devices and Support         Dental       (+) Modest Abnormalities - crowns, retainers, 1 or 2 missing teeth      Cardiovascular   cardiovascular exam normal          Pulmonary   pulmonary exam normal                OUTSIDE LABS:  CBC:   Lab Results   Component Value Date    WBC 11.2 (H) 2023    WBC 15.4 (H) 2023    HGB 14.3 2024    HGB 13.1 2023    HCT 27.7 (L) 2023    HCT 28.1 (L) 2023     2023      (L) 07/20/2023     BMP:   Lab Results   Component Value Date     07/23/2023     08/26/2021    POTASSIUM 3.3 (L) 07/23/2023    POTASSIUM 3.4 (L) 08/26/2021    CHLORIDE 105 07/23/2023    CHLORIDE 105 08/26/2021    CO2 22 07/23/2023    CO2 22 08/26/2021    BUN 8.5 07/23/2023    BUN 9 08/26/2021    CR 0.58 07/23/2023    CR 0.84 07/20/2023    GLC 73 07/23/2023    GLC 65 (L) 07/21/2023     COAGS:   Lab Results   Component Value Date    INR 0.96 08/30/2022     POC:   Lab Results   Component Value Date    HCG Positive (A) 08/20/2021     HEPATIC:   Lab Results   Component Value Date    ALBUMIN 3.1 (L) 07/23/2023    PROTTOTAL 6.1 (L) 07/23/2023    ALT 22 07/23/2023    AST 31 07/23/2023    ALKPHOS 126 (H) 07/23/2023    BILITOTAL 0.2 07/23/2023     OTHER:   Lab Results   Component Value Date    A1C 5.6 12/18/2023    MADIHA 8.5 (L) 07/23/2023    MAG 1.8 07/23/2023    LIPASE 22 07/23/2023    TSH 1.83 01/12/2023    T4 1.06 01/12/2023       Anesthesia Plan    ASA Status:  3    NPO Status:  NPO Appropriate    Anesthesia Type: MAC.              Consents    Anesthesia Plan(s) and associated risks, benefits, and realistic alternatives discussed. Questions answered and patient/representative(s) expressed understanding.     - Discussed:     - Discussed with:  Patient            Postoperative Care    Pain management: Multi-modal analgesia.   PONV prophylaxis: Ondansetron (or other 5HT-3)     Comments:               LANDY SCHAEFER MD    I have reviewed the pertinent notes and labs in the chart from the past 30 days and (re)examined the patient.  Any updates or changes from those notes are reflected in this note.

## 2024-03-25 NOTE — OP NOTE
PROCEDURE NOTE - SUCTION D & C    NAME: Michelle Ramirez  : 1990  MRN: [id number]     DATE OF SERVICE: 3/25/2024     PREOPERATIVE DIAGNOSIS: Missed AB at unknown weeks gestation    POSTOPERATIVE DIAGNOSIS: Same    PROCEDURE: Suction dilatation and curettage    SURGEON: Pinky Damon DO    ANESTHESIA: MAC    ESTIMATED BLOOD LOSS: 5ml    SPECIMENS: Uterine contents, sent to pathology for chromosomal analysis    COMPLICATIONS: None.     HISTORY OF PRESENT ILLNESS:  This is a 34 year old female with a known missed . The risks, benefits and alternatives to the procedure were discussed with her at length.  She expressed understanding and wished to proceed.     PROCEDURE NOTE:  Patient was brought to the operating room and after induction of anesthesia was prepped and draped in the dorsal lithotomy position.  A time out was called and the patient and the procedure were verified.  A bimanual exam was done.  Uterus was noted to be 10 weeks gestation. A sterile speculum was placed.  The anterior lip of the cervix was grasped with a single tooth tenaculum and then the posterior lip.  Mylene cervical dilators were used to dilate the cervix.  A # 9 suction curette was induced and rotated to clear the uterus of all products of conception.  A sharp curette was then introduced. Once it was felt that all tissue was removed from all quadrants a decision was made to terminate the procedure. Sponge and instrument counts were correct. Patient tolerated the procedure well and was taken to the recovery room in good condition.    SPECIMENS SENT: products of conception for chromosomal analysis    Pinky Damon DO      CC: [facility], Pinky Damon DO

## 2024-03-27 LAB
PATH REPORT.COMMENTS IMP SPEC: NORMAL
PATH REPORT.COMMENTS IMP SPEC: NORMAL
PATH REPORT.FINAL DX SPEC: NORMAL
PATH REPORT.GROSS SPEC: NORMAL
PATH REPORT.RELEVANT HX SPEC: NORMAL
PHOTO IMAGE: NORMAL

## 2024-04-29 LAB
INTERPRETATION: NORMAL
ISCN: NORMAL
METHODS: NORMAL

## 2024-05-15 ENCOUNTER — LAB REQUISITION (OUTPATIENT)
Dept: LAB | Facility: CLINIC | Age: 34
End: 2024-05-15

## 2024-05-15 ENCOUNTER — LAB REQUISITION (OUTPATIENT)
Dept: LAB | Facility: CLINIC | Age: 34
End: 2024-05-15
Payer: COMMERCIAL

## 2024-05-15 DIAGNOSIS — N96 RECURRENT PREGNANCY LOSS: ICD-10-CM

## 2024-05-15 LAB — TSH SERPL DL<=0.005 MIU/L-ACNC: 3.54 UIU/ML (ref 0.3–4.2)

## 2024-05-15 PROCEDURE — 86147 CARDIOLIPIN ANTIBODY EA IG: CPT | Performed by: OBSTETRICS & GYNECOLOGY

## 2024-05-15 PROCEDURE — 84443 ASSAY THYROID STIM HORMONE: CPT | Performed by: OBSTETRICS & GYNECOLOGY

## 2024-05-15 PROCEDURE — 86146 BETA-2 GLYCOPROTEIN ANTIBODY: CPT | Performed by: OBSTETRICS & GYNECOLOGY

## 2024-05-15 PROCEDURE — 85613 RUSSELL VIPER VENOM DILUTED: CPT | Mod: ORL | Performed by: OBSTETRICS & GYNECOLOGY

## 2024-05-16 LAB
B2 GLYCOPROT1 IGG SERPL IA-ACNC: 0.8 U/ML
B2 GLYCOPROT1 IGM SERPL IA-ACNC: <2.4 U/ML
CARDIOLIPIN IGG SER IA-ACNC: <2 GPL-U/ML
CARDIOLIPIN IGG SER IA-ACNC: NEGATIVE
CARDIOLIPIN IGM SER IA-ACNC: <2 MPL-U/ML
CARDIOLIPIN IGM SER IA-ACNC: NEGATIVE

## 2024-05-17 LAB
DRVVT SCREEN RATIO: 0.7
INR PPP: 1 (ref 0.85–1.15)
LA PPP-IMP: NEGATIVE
LUPUS INTERPRETATION: NORMAL
PATH REPORT.COMMENTS IMP SPEC: NORMAL
PTT RATIO: 1.03
THROMBIN TIME: 15.8 SECONDS (ref 13–19)

## 2025-03-03 ENCOUNTER — LAB REQUISITION (OUTPATIENT)
Dept: LAB | Facility: CLINIC | Age: 35
End: 2025-03-03

## 2025-03-03 DIAGNOSIS — Z32.01 ENCOUNTER FOR PREGNANCY TEST, RESULT POSITIVE: ICD-10-CM

## 2025-03-03 LAB — HCG INTACT+B SERPL-ACNC: 1460 MIU/ML

## 2025-03-03 PROCEDURE — 84702 CHORIONIC GONADOTROPIN TEST: CPT | Performed by: OBSTETRICS & GYNECOLOGY

## 2025-03-16 ENCOUNTER — HEALTH MAINTENANCE LETTER (OUTPATIENT)
Age: 35
End: 2025-03-16

## 2025-03-28 ENCOUNTER — TRANSFERRED RECORDS (OUTPATIENT)
Dept: HEALTH INFORMATION MANAGEMENT | Facility: CLINIC | Age: 35
End: 2025-03-28
Payer: COMMERCIAL

## 2025-04-07 ENCOUNTER — TRANSCRIBE ORDERS (OUTPATIENT)
Dept: OTHER | Age: 35
End: 2025-04-07

## 2025-04-07 ENCOUNTER — MEDICAL CORRESPONDENCE (OUTPATIENT)
Dept: HEALTH INFORMATION MANAGEMENT | Facility: CLINIC | Age: 35
End: 2025-04-07
Payer: COMMERCIAL

## 2025-04-07 DIAGNOSIS — O24.419 GESTATIONAL DIABETES MELLITUS (GDM) IN FIRST TRIMESTER, GESTATIONAL DIABETES METHOD OF CONTROL UNSPECIFIED: Primary | ICD-10-CM

## 2025-04-16 ENCOUNTER — LAB REQUISITION (OUTPATIENT)
Dept: LAB | Facility: CLINIC | Age: 35
End: 2025-04-16

## 2025-04-16 ENCOUNTER — TRANSFERRED RECORDS (OUTPATIENT)
Dept: HEALTH INFORMATION MANAGEMENT | Facility: CLINIC | Age: 35
End: 2025-04-16
Payer: COMMERCIAL

## 2025-04-16 ENCOUNTER — MEDICAL CORRESPONDENCE (OUTPATIENT)
Dept: HEALTH INFORMATION MANAGEMENT | Facility: CLINIC | Age: 35
End: 2025-04-16
Payer: COMMERCIAL

## 2025-04-16 DIAGNOSIS — Z11.3 ENCOUNTER FOR SCREENING FOR INFECTIONS WITH A PREDOMINANTLY SEXUAL MODE OF TRANSMISSION: ICD-10-CM

## 2025-04-16 PROCEDURE — 87491 CHLMYD TRACH DNA AMP PROBE: CPT | Performed by: OBSTETRICS & GYNECOLOGY

## 2025-04-17 ENCOUNTER — MEDICAL CORRESPONDENCE (OUTPATIENT)
Dept: HEALTH INFORMATION MANAGEMENT | Facility: CLINIC | Age: 35
End: 2025-04-17
Payer: COMMERCIAL

## 2025-04-17 ENCOUNTER — TRANSCRIBE ORDERS (OUTPATIENT)
Dept: MATERNAL FETAL MEDICINE | Facility: HOSPITAL | Age: 35
End: 2025-04-17
Payer: COMMERCIAL

## 2025-04-17 DIAGNOSIS — O26.90 PREGNANCY RELATED CONDITION, ANTEPARTUM: Primary | ICD-10-CM

## 2025-04-17 LAB
C TRACH DNA SPEC QL PROBE+SIG AMP: NEGATIVE
N GONORRHOEA DNA SPEC QL NAA+PROBE: NEGATIVE
SPECIMEN TYPE: NORMAL

## 2025-04-24 ENCOUNTER — ALLIED HEALTH/NURSE VISIT (OUTPATIENT)
Dept: EDUCATION SERVICES | Facility: CLINIC | Age: 35
End: 2025-04-24
Attending: NURSE PRACTITIONER
Payer: COMMERCIAL

## 2025-04-24 DIAGNOSIS — O24.419 GESTATIONAL DIABETES MELLITUS (GDM) IN FIRST TRIMESTER, GESTATIONAL DIABETES METHOD OF CONTROL UNSPECIFIED: ICD-10-CM

## 2025-04-24 RX ORDER — LANCETS
EACH MISCELLANEOUS
Qty: 100 EACH | Refills: 7 | Status: SHIPPED | OUTPATIENT
Start: 2025-04-24

## 2025-04-24 RX ORDER — BLOOD SUGAR DIAGNOSTIC
1 STRIP MISCELLANEOUS 4 TIMES DAILY
Qty: 150 STRIP | Refills: 7 | Status: SHIPPED | OUTPATIENT
Start: 2025-04-24

## 2025-04-24 NOTE — PROGRESS NOTES
Diabetes Self-Management Education & Support    Type of Service: In Person Visit    Assessment      Patient seen today for initial visit. Professional  is present on the phone as well. Eating 3 meals/day. Meals consist of <1 cup of rice with meat and vegetables. Pt reports if she eats >1 cup she will feel dizzy.  Does not eat fruit often. Does not snack often. Drinks milk and OJ occasionally. Reviewed portions. She feels she may not be getting enough to eat.     Diet controlled GDM in 2023.       Intervention  Patient was instructed on Accu-Chek Guide Me meter and was able to provide an accurate return demonstration. Patient's blood glucose reading today was 79 mg/dL, 3.5 hours after breakfast.    Educational topics covered today:  GDM diagnosis, pathophysiology, risks and complications of GDM, means of controlling GDM, using a blood glucose monitor, blood glucose goals, logging and interpreting glucose results, ketone testing, when to call a diabetes educator or OB provider, healthy eating during pregnancy, counting carbohydrates, meal planning for GDM, and physical activity    Educational materials provided today:   [place] Understanding Gestational Diabetes  GDM Log Book  Sharps Disposal  Care After Delivery  Accu-Chek Guide Me meter kit    Plan  Check glucose 4 times daily, before breakfast and 1 hour after each meal.     Check Ketones daily for one week, if negative, reduce testing to once a week.     Physical activity recommended: walking after meals when able.    Meal plan: 30-45g carbohydrates at breakfast, 45-60g carbohydrates at lunch, 45-60g carbohydrates at supper, 15-30g carbohydrates at 3 snacks a day.  Follow consistent carbohydrate meal plan, eat carbohydrates and protein/fat at all meals/snacks.    Call/MyChart message diabetes educator if 3 or more blood sugars are above the goal in 1 week, if ketones are positive, or with questions/concerns.    Follow-up:    Follow up on Upcoming  "Diabetes Ed Appointments     Visit Type Date Time Department    GDM ED 4/24/2025 12:00 PM [facility] DIABETES EDUCATION    GDM ED 5/1/2025 12:00 PM [facility] DIABETES EDUCATION        Subjective/Objective  Michelle Jeffries is an 35 year old, presenting for the following diabetes education related to:      Accompanied by: Self,  (Ligia, accompanied patient)  Gestational weeks: 11  Hospital planned for delivery: [facility]  Number of previous pregnancies: 5 (miscarriages x 2   )  Had any babies over 9 lbs: No ( )  Previously had Gestational Diabetes: Yes ( )  Had Diabetes Education before: Yes  Previous insulin or other diabetes medication during that pregnancy: No  Have you ever had thyroid problems or taken thyroid medication?: No  Heart disease, mitral valve prolapse or rheumatic fever?: No  Hypertension : No  High Cholesterol: Unknown ( )  High Triglycerides: Unknown ( )  Do you use tobacco products?: No ( )  Do you drink beer, wine or hard liquor?: No ( )    Cultural Influences/Ethnic Background:  Not  or       Estimated Date of Delivery: 11/11/25      Lab Results   Component Value Date    A1C 6.2 03/28/2025    A1C 5.6 12/18/2023    A1C 5.3 07/17/2023    A1C 5.2 08/30/2022           1 hour OGTT  No results found for: \"GLU1\"      3 hour OGTT    Fasting  No results found for: \"GTTGF\"    1 hour  No results found for: \"GTTG1\"    2 hour  No results found for: \"GTTG2\"    3 hour  No results found for: \"GTTG3\"    Healthy Eating:  Pre-pregnancy weight (lbs):  ( )  Exercise:: Currently not exercising  Cultural/Anglican diet restrictions?: No  Meal planning/habits: None  How many times a week on average do you eat food made away from home (restaurant/take-out)?:  (Tamy meal,sausage, Hmong food, egg rolls )  Meals include: Dinner, Breakfast, Lunch  Breakfast: traditional hmong meal  Lunch: traditional Hmong meal  Dinner: traditional hmong meal  Snacks: not a big snacker  Other: not a big fruit fan  Beverages: Water, " Milk, Juice  How many servings of fruits/vegetables per day: 3  Biggest challenges to healthy eating: Portion control  Pre-chari vitamin?: Yes  Supplements?: No  Experiencing nausea?: No  Experiencing heartburn?: No    Healthy Coping:  Emotional response to diabetes: Ready to learn  Informal Support system:: Family, Partner  Stage of change: PREPARATION (Decided to change - considering how)    Current Management:  Taking medications for gestational diabetes?: No    Anjelica Jewell RN CDE  Time Spent: 40 minutes  Encounter Type: Individual     Any diabetes medication dose changes were made via the CDCES Standing Orders under the patient's referring provider.

## 2025-04-24 NOTE — LETTER
4/24/2025         RE: Michelle Ramirez  [address]  [facility] [address]        Dear Colleague,    Thank you for referring your patient, Michelle Ramirez, to the [facility]. Please see a copy of my visit note below.    Diabetes Self-Management Education & Support    Type of Service: In Person Visit    Assessment      Patient seen today for initial visit. Professional  is present on the phone as well. Eating 3 meals/day. Meals consist of <1 cup of rice with meat and vegetables. Pt reports if she eats >1 cup she will feel dizzy.  Does not eat fruit often. Does not snack often. Drinks milk and OJ occasionally. Reviewed portions. She feels she may not be getting enough to eat.     Diet controlled GDM in 2023.       Intervention  Patient was instructed on Accu-Chek Guide Me meter and was able to provide an accurate return demonstration. Patient's blood glucose reading today was 79 mg/dL, 3.5 hours after breakfast.    Educational topics covered today:  GDM diagnosis, pathophysiology, risks and complications of GDM, means of controlling GDM, using a blood glucose monitor, blood glucose goals, logging and interpreting glucose results, ketone testing, when to call a diabetes educator or OB provider, healthy eating during pregnancy, counting carbohydrates, meal planning for GDM, and physical activity    Educational materials provided today:   [place] Understanding Gestational Diabetes  GDM Log Book  Sharps Disposal  Care After Delivery  Accu-Chek Guide Me meter kit    Plan  Check glucose 4 times daily, before breakfast and 1 hour after each meal.     Check Ketones daily for one week, if negative, reduce testing to once a week.     Physical activity recommended: walking after meals when able.    Meal plan: 30-45g carbohydrates at breakfast, 45-60g carbohydrates at lunch, 45-60g carbohydrates at supper, 15-30g carbohydrates at 3 snacks a day.  Follow consistent carbohydrate meal plan, eat  "carbohydrates and protein/fat at all meals/snacks.    Call/MyChart message diabetes educator if 3 or more blood sugars are above the goal in 1 week, if ketones are positive, or with questions/concerns.    Follow-up:    Follow up on Upcoming Diabetes Ed Appointments     Visit Type Date Time Department    GDM ED 4/24/2025 12:00 PM [facility] DIABETES EDUCATION    GDM ED 5/1/2025 12:00 PM [facility] DIABETES EDUCATION        Subjective/Objective  Michelle Jeffries is an 35 year old, presenting for the following diabetes education related to:      Accompanied by: Self,  (Ligia, accompanied patient)  Gestational weeks: 11  [facility] planned for delivery: [place]  Number of previous pregnancies: 5 (miscarriages x 2   )  Had any babies over 9 lbs: No ( )  Previously had Gestational Diabetes: Yes ( )  Had Diabetes Education before: Yes  Previous insulin or other diabetes medication during that pregnancy: No  Have you ever had thyroid problems or taken thyroid medication?: No  Heart disease, mitral valve prolapse or rheumatic fever?: No  Hypertension : No  High Cholesterol: Unknown ( )  High Triglycerides: Unknown ( )  Do you use tobacco products?: No ( )  Do you drink beer, wine or hard liquor?: No ( )    Cultural Influences/Ethnic Background:  Not  or       Estimated Date of Delivery: 11/11/25      Lab Results   Component Value Date    A1C 6.2 03/28/2025    A1C 5.6 12/18/2023    A1C 5.3 07/17/2023    A1C 5.2 08/30/2022           1 hour OGTT  No results found for: \"GLU1\"      3 hour OGTT    Fasting  No results found for: \"GTTGF\"    1 hour  No results found for: \"GTTG1\"    2 hour  No results found for: \"GTTG2\"    3 hour  No results found for: \"GTTG3\"    Healthy Eating:  Pre-pregnancy weight (lbs):  ( )  Exercise:: Currently not exercising  Cultural/Zoroastrianism diet restrictions?: No  Meal planning/habits: None  How many times a week on average do you eat food made away from home (restaurant/take-out)?:  (Tamy meal,sausage, " Hmong food, egg rolls )  Meals include: Dinner, Breakfast, Lunch  Breakfast: traditional hmong meal  Lunch: traditional Hmong meal  Dinner: traditional hmong meal  Snacks: not a big snacker  Other: not a big fruit fan  Beverages: Water, Milk, Juice  How many servings of fruits/vegetables per day: 3  Biggest challenges to healthy eating: Portion control  Pre-chari vitamin?: Yes  Supplements?: No  Experiencing nausea?: No  Experiencing heartburn?: No    Healthy Coping:  Emotional response to diabetes: Ready to learn  Informal Support system:: Family, Partner  Stage of change: PREPARATION (Decided to change - considering how)    Current Management:  Taking medications for gestational diabetes?: No    Anjelica Jewell RN CDE  Time Spent: 40 minutes  Encounter Type: Individual     Any diabetes medication dose changes were made via the CDCES Standing Orders under the patient's referring provider.

## 2025-06-02 ENCOUNTER — ALLIED HEALTH/NURSE VISIT (OUTPATIENT)
Dept: EDUCATION SERVICES | Facility: CLINIC | Age: 35
End: 2025-06-02
Payer: COMMERCIAL

## 2025-06-02 DIAGNOSIS — O24.419 GESTATIONAL DIABETES MELLITUS (GDM) IN FIRST TRIMESTER, GESTATIONAL DIABETES METHOD OF CONTROL UNSPECIFIED: Primary | ICD-10-CM

## 2025-06-02 NOTE — PROGRESS NOTES
Gestational Diabetes Self-Management Education & Support    Type of Service: In Person Visit      Assessment  Ketones: reports mostly small.   Fasting blood glucoses: 81% in target. (16 readings in the last 30 days)  After breakfast: 77% in target. (9 readings in the last 30 days)  After lunch/dinner: 77% in target.(9 readings in the last 30 days       Patient seen today for follow up. Professional  is present as well. Plan was for patient to send in BG over [employer] on 5/16. Patient states she could not reply to the message and she does not know how to start a new message on [employer]. Therefore, will not use [employer] for blood sugar updates moving forward. She brings in BG meter today. We reviewed all readings. She has many missed readings, sometimes days w/o any readings. Pt states she just accidentally forgets. 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 However, many missed meal readings and although her meal elevations are not too often right now, there could be elevations that we don't know about. Discussed at what point elevations would be happening too often and risks of this. Patient agrees she will focus on getting her BG checks 1 hour after meals.     She is only eating 2 meals/day, only eating rice with lunch/dinner meal, eating <1 cup. She does not snack often, does not seem to have a large appetite. She reports most ketones are coming back small. Discussed trying to add some healthy snacks during the day or at HS.         Intervention  Educational topics covered today:    Patient had questions about after delivery. We discussed her A1c was at 6.2% on 3/28/25, discussed this is in the pre-DM range.   What to expect after delivery, future testing for Type 2 diabetes (2 hour OGTT at 6 week post-partum check-up and annual fasting blood glucose level), risk of GDM and planning ahead for future pregnancies, recommended lifestyle interventions for reducing the risk of Type 2 Diabetes, when to  call a Diabetes Educator or OB provider    Educational Materials provided today:  [place] Preventing Diabetes    Patient verbalized understanding of diabetes self-management education concepts discussed, opportunities for ongoing education and support, and recommendations provided today    Plan  Check glucose four times daily, before breakfast and 1 hour after each meal.  Check ketones once a week when readings are consistently negative.  Continue with recommended physical activity.  Continue to follow recommended meal plan: 30-45g carbohydratess at breakfast, 45-60g carbohydratess at lunch, 45-60g carbohydrates at supper, 15-30g carbohydrates at snacks.  Follow consistent carbohydrate meal plan, eat carbohydrates and protein/fat at all meals/snacks.    If 3 or more blood sugars are above the goal at a given time, or if Ketones are small, moderate or large, call the diabetes educator.    Will follow up in 2 weeks as scheduled.     Subjective/Objective  Michelle Jeffries is an 35 year old, presenting for the following diabetes education related to: Follow-up        6/2/2025   General   Accompanied by        teo Strong patient       Cultural Influences/Ethnic Background:  Not  or       Estimated Date of Delivery: 11/11/25    Blood Glucose/Ketone Log:   Date Ketones Fasting Post Breakfast Post Lunch/Dinner   6/2  95     5/30  92 107 103   5/27  92 128 97   5/23  86 107 143   5/22  81     5/21  85     5/20  86 88 109     5/19  88 84 92   5/18  88     5/15  93     5/14  92  183   5/13  87  106   5/9  90  82   5/8  86 151 106     5/7   108    5/3  98 184      5/2   106    5/3  95             6/2/2025   Lifestyle and Health Behaviors   Exercise: Currently not exercising   Cultural/Anglican diet restrictions? No   Meal planning/habits None   How many times a week on average do you eat food made away from home (restaurant/take-out)? --        Tamy meal,sausage, Hmong food, egg rolls    Meals include  Dinner;Breakfast;Lunch   Breakfast traditional hmong meal   Lunch traditional Hmong meal   Dinner traditional hmong meal   Snacks not a big snacker   Other not a big fruit fan   Beverages Water;Milk;Juice         6/2/2025   Healthy Coping   Informal Support system: Family;Partner           6/2/2025   Current Management   Taking medications for gestational diabetes? No         6/2/2025   Education   Blood Glucose Meter Accu-chek   How confident are you filling out medical forms by yourself: A little bit   Other concerns Language barrier         Time Spent: 30 minutes  Encounter Type: Individual     Diabetes medication dose changes were made via the CDCES Standing Orders under the patient's referring provider.

## 2025-06-02 NOTE — LETTER
6/2/2025         RE: Michelle Ramirez  [address] [place] [address]        Dear Colleague,    Thank you for referring your patient, Michelle Ramierz, to the [facility]. Please see a copy of my visit note below.    Gestational Diabetes Self-Management Education & Support    Type of Service: In Person Visit      Assessment  Ketones: reports mostly small.   Fasting blood glucoses: 81% in target. (16 readings in the last 30 days)  After breakfast: 77% in target. (9 readings in the last 30 days)  After lunch/dinner: 77% in target.(9 readings in the last 30 days       Patient seen today for follow up. Professional  is present as well. Plan was for patient to send in BG over [employer] on 5/16. Patient states she could not reply to the message and she does not know how to start a new message on [employer]. Therefore, will not use [employer] for blood sugar updates moving forward. She brings in BG meter today. We reviewed all readings. She has many missed readings, sometimes days w/o any readings. Pt states she just accidentally forgets. 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 However, many missed meal readings and although her meal elevations are not too often right now, there could be elevations that we don't know about. Discussed at what point elevations would be happening too often and risks of this. Patient agrees she will focus on getting her BG checks 1 hour after meals.     She is only eating 2 meals/day, only eating rice with lunch/dinner meal, eating <1 cup. She does not snack often, does not seem to have a large appetite. She reports most ketones are coming back small. Discussed trying to add some healthy snacks during the day or at HS.         Intervention  Educational topics covered today:    Patient had questions about after delivery. We discussed her A1c was at 6.2% on 3/28/25, discussed this is in the pre-DM range.   What to expect after delivery, future testing for  Type 2 diabetes (2 hour OGTT at 6 week post-partum check-up and annual fasting blood glucose level), risk of GDM and planning ahead for future pregnancies, recommended lifestyle interventions for reducing the risk of Type 2 Diabetes, when to call a Diabetes Educator or OB provider    Educational Materials provided today:  [place] Preventing Diabetes    Patient verbalized understanding of diabetes self-management education concepts discussed, opportunities for ongoing education and support, and recommendations provided today    Plan  Check glucose four times daily, before breakfast and 1 hour after each meal.  Check ketones once a week when readings are consistently negative.  Continue with recommended physical activity.  Continue to follow recommended meal plan: 30-45g carbohydratess at breakfast, 45-60g carbohydratess at lunch, 45-60g carbohydrates at supper, 15-30g carbohydrates at snacks.  Follow consistent carbohydrate meal plan, eat carbohydrates and protein/fat at all meals/snacks.    If 3 or more blood sugars are above the goal at a given time, or if Ketones are small, moderate or large, call the diabetes educator.    Will follow up in 2 weeks as scheduled.     Subjective/Objective  Michelle Jeffries is an 35 year old, presenting for the following diabetes education related to: Follow-up        6/2/2025   General   Accompanied by        teo Strong patient       Cultural Influences/Ethnic Background:  Not  or       Estimated Date of Delivery: 11/11/25    Blood Glucose/Ketone Log:   Date Ketones Fasting Post Breakfast Post Lunch/Dinner   6/2  95     5/30  92 107 103   5/27  92 128 97   5/23  86 107 143   5/22  81     5/21  85     5/20  86 88 109     5/19  88 84 92   5/18  88     5/15  93     5/14  92  183   5/13  87  106   5/9  90  82   5/8  86 151 106     5/7   108    5/3  98 184      5/2   106    5/3  95             6/2/2025   Lifestyle and Health Behaviors   Exercise: Currently not  exercising   Cultural/Nondenominational diet restrictions? No   Meal planning/habits None   How many times a week on average do you eat food made away from home (restaurant/take-out)? --        Tamy meal,sausage, Hmong food, egg rolls    Meals include Dinner;Breakfast;Lunch   Breakfast traditional hmong meal   Lunch traditional Hmong meal   Dinner traditional hmong meal   Snacks not a big snacker   Other not a big fruit fan   Beverages Water;Milk;Juice         6/2/2025   Healthy Coping   Informal Support system: Family;Partner           6/2/2025   Current Management   Taking medications for gestational diabetes? No         6/2/2025   Education   Blood Glucose Meter Accu-chek   How confident are you filling out medical forms by yourself: A little bit   Other concerns Language barrier         Time Spent: 30 minutes  Encounter Type: Individual     Diabetes medication dose changes were made via the CDCES Standing Orders under the patient's referring provider.

## 2025-06-03 ENCOUNTER — TRANSFERRED RECORDS (OUTPATIENT)
Dept: HEALTH INFORMATION MANAGEMENT | Facility: CLINIC | Age: 35
End: 2025-06-03
Payer: COMMERCIAL

## 2025-06-11 ENCOUNTER — LAB REQUISITION (OUTPATIENT)
Dept: LAB | Facility: CLINIC | Age: 35
End: 2025-06-11

## 2025-06-11 DIAGNOSIS — Z36.1 ENCOUNTER FOR ANTENATAL SCREENING FOR RAISED ALPHAFETOPROTEIN LEVEL: ICD-10-CM

## 2025-06-11 PROCEDURE — 82105 ALPHA-FETOPROTEIN SERUM: CPT | Performed by: OBSTETRICS & GYNECOLOGY

## 2025-06-12 ENCOUNTER — PRE VISIT (OUTPATIENT)
Dept: MATERNAL FETAL MEDICINE | Facility: HOSPITAL | Age: 35
End: 2025-06-12
Payer: COMMERCIAL

## 2025-06-14 LAB
# FETUSES US: NORMAL
AFP MOM SERPL: 1.43
AFP SERPL-MCNC: 48 NG/ML
AGE - REPORTED: 33.8 YR
CURRENT SMOKER: NO
FAMILY MEMBER DISEASES HX: NO
GA METHOD: NORMAL
GA: NORMAL WK
IDDM PATIENT QL: NO
INTEGRATED SCN PATIENT-IMP: NORMAL
SPECIMEN DRAWN SERPL: NORMAL

## 2025-06-16 ENCOUNTER — APPOINTMENT (OUTPATIENT)
Dept: INTERPRETER SERVICES | Facility: CLINIC | Age: 35
End: 2025-06-16
Payer: COMMERCIAL

## 2025-06-16 ENCOUNTER — TELEPHONE (OUTPATIENT)
Dept: EDUCATION SERVICES | Facility: CLINIC | Age: 35
End: 2025-06-16
Payer: COMMERCIAL

## 2025-06-16 NOTE — TELEPHONE ENCOUNTER
Writer spoke to Michelle via [employer]  to reschedule 6/20 appointment due to provider being out. Patient thought appointment was for 6/27. Rescheduled appointment to 6/27 at 11am per patient request.

## 2025-06-17 ENCOUNTER — OFFICE VISIT (OUTPATIENT)
Dept: MATERNAL FETAL MEDICINE | Facility: HOSPITAL | Age: 35
End: 2025-06-17
Attending: OBSTETRICS & GYNECOLOGY
Payer: COMMERCIAL

## 2025-06-17 ENCOUNTER — ANCILLARY PROCEDURE (OUTPATIENT)
Dept: ULTRASOUND IMAGING | Facility: HOSPITAL | Age: 35
End: 2025-06-17
Attending: OBSTETRICS & GYNECOLOGY
Payer: COMMERCIAL

## 2025-06-17 DIAGNOSIS — O99.210 OBESITY DURING PREGNANCY: ICD-10-CM

## 2025-06-17 DIAGNOSIS — O24.410 DIET CONTROLLED GESTATIONAL DIABETES MELLITUS (GDM) IN SECOND TRIMESTER: Primary | ICD-10-CM

## 2025-06-17 DIAGNOSIS — O26.90 PREGNANCY RELATED CONDITION, ANTEPARTUM: ICD-10-CM

## 2025-06-17 PROCEDURE — 76811 OB US DETAILED SNGL FETUS: CPT

## 2025-06-17 PROCEDURE — T1013 SIGN LANG/ORAL INTERPRETER: HCPCS | Mod: U4 | Performed by: INTERPRETER

## 2025-06-17 NOTE — PROGRESS NOTES
"Please see \"Imaging\" tab under \"Chart Review\" for details of today's visit.    Oneida Alva    "

## 2025-06-17 NOTE — NURSING NOTE
Ipad  used for [facility] appt- Jesica Bone    Pt at [facility] for ultrasound.  Pt starting to feel fetal movement, denies bleeding, leaking fluid, contractions or other concerns.  Pt reports she is checking blood glucose levels QID- fastings are under 95 and after meal values range from .  Education provided about today's ultrasound.  SBAR given to MD.

## 2025-06-23 ENCOUNTER — TRANSFERRED RECORDS (OUTPATIENT)
Dept: HEALTH INFORMATION MANAGEMENT | Facility: CLINIC | Age: 35
End: 2025-06-23
Payer: COMMERCIAL

## 2025-07-08 ENCOUNTER — OFFICE VISIT (OUTPATIENT)
Dept: MATERNAL FETAL MEDICINE | Facility: HOSPITAL | Age: 35
End: 2025-07-08
Attending: OBSTETRICS & GYNECOLOGY
Payer: COMMERCIAL

## 2025-07-08 ENCOUNTER — ANCILLARY PROCEDURE (OUTPATIENT)
Dept: ULTRASOUND IMAGING | Facility: HOSPITAL | Age: 35
End: 2025-07-08
Attending: OBSTETRICS & GYNECOLOGY
Payer: COMMERCIAL

## 2025-07-08 DIAGNOSIS — O24.410 DIET CONTROLLED GESTATIONAL DIABETES MELLITUS (GDM) IN SECOND TRIMESTER: ICD-10-CM

## 2025-07-08 DIAGNOSIS — O35.BXX0 MATERNAL CARE FOR OTHER (SUSPECTED) FETAL ABNORMALITY AND DAMAGE, FETAL CARDIAC ANOMALIES, NOT APPLICABLE OR UNSPECIFIED: Primary | ICD-10-CM

## 2025-07-08 DIAGNOSIS — O36.62X0 EXCESSIVE FETAL GROWTH AFFECTING MANAGEMENT OF PREGNANCY IN SECOND TRIMESTER, SINGLE OR UNSPECIFIED FETUS: ICD-10-CM

## 2025-07-08 PROCEDURE — 76816 OB US FOLLOW-UP PER FETUS: CPT | Mod: 26 | Performed by: OBSTETRICS & GYNECOLOGY

## 2025-07-08 PROCEDURE — 76816 OB US FOLLOW-UP PER FETUS: CPT

## 2025-07-08 NOTE — NURSING NOTE
Michelle Ramirez is a  at 22w0d who presents to [facility] for a follow-up ultrasound. ong I pad  utilized for today's visit. Pt reports positive fetal movement. Pt denies bldg/lof/change in discharge, contractions, headache, vision changes, chest pain/SOB or edema. Pt states FBS average 85, 2 hous post meals around 120. SBAR given to Dr. Alva, see note in Epic.      Pinky Carroll RN

## 2025-07-09 ENCOUNTER — APPOINTMENT (OUTPATIENT)
Dept: INTERPRETER SERVICES | Facility: CLINIC | Age: 35
End: 2025-07-09
Payer: COMMERCIAL

## 2025-07-11 ENCOUNTER — ALLIED HEALTH/NURSE VISIT (OUTPATIENT)
Dept: EDUCATION SERVICES | Facility: CLINIC | Age: 35
End: 2025-07-11
Payer: COMMERCIAL

## 2025-07-11 DIAGNOSIS — O24.419 GESTATIONAL DIABETES MELLITUS (GDM) IN FIRST TRIMESTER, GESTATIONAL DIABETES METHOD OF CONTROL UNSPECIFIED: Primary | ICD-10-CM

## 2025-07-11 PROCEDURE — T1013 SIGN LANG/ORAL INTERPRETER: HCPCS | Mod: U4 | Performed by: INTERPRETER

## 2025-07-11 NOTE — PROGRESS NOTES
Gestational Diabetes Self-Management Education & Support    Type of Service: In Person Visit      Assessment  Ketones: small.   Fasting blood glucoses: 100% in target.  After breakfast: 75% in target.  After lunch: 67% in target.  After dinner: 100% in target.    Michelle is here today for repeat turn gestational diabetes management.  She is currently 22 weeks and 3 days and is due 11-.  She had a recent ultrasound that indicated it was abnormal as well as being told that baby is large.  I reviewed blood sugars with her while she is in target for fasting blood sugars there is a large amount of missing data for 1 hour postprandials.  She does not check her blood sugar after dinner in the evening because she usually skips dinner.  There are several days of data also missing.  I reviewed with her the importance of tracking her blood sugars even if she is not eating in order for us to see if any medications need to be added or if we need to make further diet changes.  I did offer to send in a prescription for CGM to assist with seeing blood sugars however she denies wanting it at this time.  We reviewed increasing activity after meals and the importance of portion sizes at mealtime.      Intervention  Educational topics covered today:  What to expect after delivery, future testing for Type 2 diabetes (2 hour OGTT at 6 week post-partum check-up and annual fasting blood glucose level), risk of GDM and planning ahead for future pregnancies, recommended lifestyle interventions for reducing the risk of Type 2 Diabetes, when to call a Diabetes Educator or OB provider    Educational Materials provided today:  [place] Preventing Diabetes    Patient verbalized understanding of diabetes self-management education concepts discussed, opportunities for ongoing education and support, and recommendations provided today    Plan  Check glucose four times daily, before breakfast and 1 hour after each meal.  Check ketones once a week  when readings are consistently negative.  Continue with recommended physical activity.  Continue to follow recommended meal plan: 30-45g carbohydratess at breakfast, 45-60g carbohydratess at lunch, 45-60g carbohydrates at supper, 15-30g carbohydrates at snacks.  Follow consistent carbohydrate meal plan, eat carbohydrates and protein/fat at all meals/snacks.    Send in Glucose Log (blood sugars) via Mac[employer]m in 7 days. If 3 or more blood sugars are above the goal at a given time, or if Ketones are small, moderate or large, call or Imonomihart message the diabetes educator.    Subjective/Objective  Michelle Jeffries is an 35 year old, presenting for the following diabetes education related to:          6/27/2025   General   Accompanied by Self   Diabetes management related comments/concerns monitoring   Gestational weeks 20w2d   Hospital planned for delivery [facility]   Next OB Visit Date 7/8/2025   Number of previous pregnancies 5       2 miscarriages   Had any babies over 9 lbs No   Previously had Gestational Diabetes Yes   Had Diabetes Education before Yes   Previous insulin or other diabetes medication during that pregnancy Unknown   Have you ever had thyroid problems or taken thyroid medication? No   Heart disease, mitral valve prolapse or rheumatic fever? No   Hypertension  No   High Cholesterol Unknown   High Triglycerides Unknown   Do you use tobacco products? Unknown   Do you drink beer, wine or hard liquor? Unknown       Cultural Influences/Ethnic Background:  Not  or       LMP 01/17/2025     Weight gain 233 lbs at 22w3d weeks gestation.    Estimated Date of Delivery: Nov 11, 2025    Blood Glucose/Ketone Log:   Date Ketones Fasting Post Breakfast Post Lunch Post Supper   7/11 small 86 99     7/10 trace 85      7/9  85 135     7/8  85      7/7  83 109     7/6        7/5        7/4        7/3   86 11    7/2        7/1  95      6/30  86 100 122    6/29 7/11/2025   Lifestyle and Health  Behaviors   Pre-pregnancy weight (lbs) 250   Exercise: Currently not exercising   Barrier to exercise Physical limitation   Cultural/Evangelical diet restrictions? No   Meal planning/habits Avoiding sweets;Low carb;Smaller portions   How many times a week on average do you eat food made away from home (restaurant/take-out)? 1   Meals include Breakfast;Lunch;Afternoon Snack       Skips dinner to avoid high BG   Breakfast 1 tbsp rice with protein and sauteed veggies   Lunch 1/3 c rice with veggies, or soup   Beverages Water   How many servings of fruits/vegetables per day -1   Biggest challenges to healthy eating Portion control   Pre-chari vitamin? Yes   Supplements? No   Experiencing nausea? No   Experiencing heartburn? No         2025   Healthy Coping   Emotional response to diabetes Unable to access   Informal Support system: Family;Spouse;Children   Stage of change ACTION (Actively working towards change)           2025   Current Management   Taking medications for gestational diabetes? No   Difficulty affording diabetes testing supplies? No         2025   Education   Blood Glucose Meter Accu-chek   Other concerns Language barrier;Literacy barrier       Megan Monge RN, BSN, [facility]  Certified Diabetes Care and   [phone]    Time Spent: 60 minutes  Encounter Type: Individual     Diabetes medication dose changes were made via the [facility] Standing Orders under the patient's referring provider.

## 2025-07-11 NOTE — LETTER
7/11/2025         RE: Michelle See James  [address] [place] [address]        Dear Colleague,    Thank you for referring your patient, Michelle See James, to the [facility]. Please see a copy of my visit note below.    Gestational Diabetes Self-Management Education & Support    Type of Service: In Person Visit      Assessment  Ketones: small.   Fasting blood glucoses: 100% in target.  After breakfast: 75% in target.  After lunch: 67% in target.  After dinner: 100% in target.    Michelle is here today for repeat turn gestational diabetes management.  She is currently 22 weeks and 3 days and is due 11-.  She had a recent ultrasound that indicated it was abnormal as well as being told that baby is large.  I reviewed blood sugars with her while she is in target for fasting blood sugars there is a large amount of missing data for 1 hour postprandials.  She does not check her blood sugar after dinner in the evening because she usually skips dinner.  There are several days of data also missing.  I reviewed with her the importance of tracking her blood sugars even if she is not eating in order for us to see if any medications need to be added or if we need to make further diet changes.  I did offer to send in a prescription for CGM to assist with seeing blood sugars however she denies wanting it at this time.  We reviewed increasing activity after meals and the importance of portion sizes at mealtime.      Intervention  Educational topics covered today:  What to expect after delivery, future testing for Type 2 diabetes (2 hour OGTT at 6 week post-partum check-up and annual fasting blood glucose level), risk of GDM and planning ahead for future pregnancies, recommended lifestyle interventions for reducing the risk of Type 2 Diabetes, when to call a Diabetes Educator or OB provider    Educational Materials provided today:  [place] Preventing Diabetes    Patient verbalized understanding of diabetes  self-management education concepts discussed, opportunities for ongoing education and support, and recommendations provided today    Plan  Check glucose four times daily, before breakfast and 1 hour after each meal.  Check ketones once a week when readings are consistently negative.  Continue with recommended physical activity.  Continue to follow recommended meal plan: 30-45g carbohydratess at breakfast, 45-60g carbohydratess at lunch, 45-60g carbohydrates at supper, 15-30g carbohydrates at snacks.  Follow consistent carbohydrate meal plan, eat carbohydrates and protein/fat at all meals/snacks.    Send in Glucose Log (blood sugars) via [employer] in 7 days. If 3 or more blood sugars are above the goal at a given time, or if Ketones are small, moderate or large, call or Rubikloud message the diabetes educator.    Subjective/Objective  Michelle Jeffries is an 35 year old, presenting for the following diabetes education related to:          6/27/2025   General   Accompanied by Self   Diabetes management related comments/concerns monitoring   Gestational weeks 20w2d   Hospital planned for delivery [place]   Next OB Visit Date 7/8/2025   Number of previous pregnancies 5       2 miscarriages   Had any babies over 9 lbs No   Previously had Gestational Diabetes Yes   Had Diabetes Education before Yes   Previous insulin or other diabetes medication during that pregnancy Unknown   Have you ever had thyroid problems or taken thyroid medication? No   Heart disease, mitral valve prolapse or rheumatic fever? No   Hypertension  No   High Cholesterol Unknown   High Triglycerides Unknown   Do you use tobacco products? Unknown   Do you drink beer, wine or hard liquor? Unknown       Cultural Influences/Ethnic Background:  Not  or       LMP 01/17/2025     Weight gain 233 lbs at 22w3d weeks gestation.    Estimated Date of Delivery: Nov 11, 2025    Blood Glucose/Ketone Log:   Date Ketones Fasting Post Breakfast Post Lunch Post  Supper    small 86 99     7/10 trace 85        85 135       85        83 109     7/6        7/5        7/4        7/3   86 11      95        86 100 122    2025   Lifestyle and Health Behaviors   Pre-pregnancy weight (lbs) 250   Exercise: Currently not exercising   Barrier to exercise Physical limitation   Cultural/Islam diet restrictions? No   Meal planning/habits Avoiding sweets;Low carb;Smaller portions   How many times a week on average do you eat food made away from home (restaurant/take-out)? 1   Meals include Breakfast;Lunch;Afternoon Snack       Skips dinner to avoid high BG   Breakfast 1 tbsp rice with protein and sauteed veggies   Lunch 1/3 c rice with veggies, or soup   Beverages Water   How many servings of fruits/vegetables per day -1   Biggest challenges to healthy eating Portion control   Pre-chari vitamin? Yes   Supplements? No   Experiencing nausea? No   Experiencing heartburn? No         2025   Healthy Coping   Emotional response to diabetes Unable to access   Informal Support system: Family;Spouse;Children   Stage of change ACTION (Actively working towards change)           2025   Current Management   Taking medications for gestational diabetes? No   Difficulty affording diabetes testing supplies? No         2025   Education   Blood Glucose Meter Accu-chek   Other concerns Language barrier;Literacy barrier       Megan Monge, RN, BSN, [facility]  Certified Diabetes Care and   [phone]    Time Spent: 60 minutes  Encounter Type: Individual     Diabetes medication dose changes were made via the [facility] Standing Orders under the patient's referring provider.

## 2025-07-16 ENCOUNTER — HOSPITAL ENCOUNTER (OUTPATIENT)
Dept: CARDIOLOGY | Facility: CLINIC | Age: 35
Discharge: HOME | End: 2025-07-16
Attending: OBSTETRICS & GYNECOLOGY
Payer: COMMERCIAL

## 2025-07-16 ENCOUNTER — OFFICE VISIT (OUTPATIENT)
Dept: CARDIOLOGY | Facility: CLINIC | Age: 35
End: 2025-07-16
Payer: COMMERCIAL

## 2025-07-16 DIAGNOSIS — O35.BXX0 MATERNAL CARE FOR OTHER (SUSPECTED) FETAL ABNORMALITY AND DAMAGE, FETAL CARDIAC ANOMALIES, NOT APPLICABLE OR UNSPECIFIED: ICD-10-CM

## 2025-07-16 DIAGNOSIS — O35.BXX0 FETAL CARDIAC DISEASE AFFECTING PREGNANCY, SINGLE OR UNSPECIFIED FETUS: Primary | ICD-10-CM

## 2025-07-16 PROCEDURE — 93325 DOPPLER ECHO COLOR FLOW MAPG: CPT

## 2025-07-16 NOTE — PROGRESS NOTES
Fetal Cardiology Consultation    Patient:  Michelle Ramirez MRN:  [id number]   YOB: 1990 Age:  35 year old   Date of Visit:  7/16/2025 PCP:  ANSON [facility] [place]   SUSAN: 11/11/2025, by Ultrasound EGA: 23w1d weeks     Dear Doctor,     I had the pleasure of seeing Michelle Ramirez at the [facility] in [place] on 7/16/2025 in consultation for fetal echocardiography results. She presented today accompanied by her partner; today's visit was facilitated through an . As you know, she is a 35 year old female with current pregnancy affected by leftward axis deviation.    The fetal echocardiogram was technically difficult. Likely normal fetal echocardiogram. Normal fetal cardiac anatomy. Normal right and left ventricular size and function without hypertrophy. No evidence of diastolic dysfunction. No pericardial effusion. No arrhythmia.     I reviewed and interpreted the fetal echocardiogram today. I discussed the normal results with Ms. Ramirez and her partner with an . While these results are normal, it is important to note that fetal echocardiography cannot exclude small atrial or ventricular septal defects, persistent ductus arteriosus, mild coarctation of the aorta, partial anomalous pulmonary venous return, minor anatomic valve anomalies, or coronary artery anomalies.     -- No additional fetal echocardiograms are recommended for this pregnancy.    Thank you for allowing me to participate in Ms. Ramirez's care. Please don't hesitate to contact me or the Fetal Cardiology team at [facility] with any questions or concerns.     This visit was separate from the performance and interpretation of the ultrasound. The majority of the time (>50%) was spent in counseling and coordination of care. I spent approximately 20 minutes in face-to-face time reviewing the above considerations.    Jo Hamilton MD  Pediatric  Cardiology  [facility]  Phone [phone]

## 2025-07-17 ENCOUNTER — OFFICE VISIT (OUTPATIENT)
Dept: URGENT CARE | Facility: URGENT CARE | Age: 35
End: 2025-07-17
Payer: COMMERCIAL

## 2025-07-17 VITALS
BODY MASS INDEX: 42.33 KG/M2 | DIASTOLIC BLOOD PRESSURE: 73 MMHG | TEMPERATURE: 98 F | WEIGHT: 230 LBS | HEIGHT: 62 IN | RESPIRATION RATE: 22 BRPM | SYSTOLIC BLOOD PRESSURE: 110 MMHG | HEART RATE: 90 BPM | OXYGEN SATURATION: 96 %

## 2025-07-17 DIAGNOSIS — J01.90 ACUTE RHINOSINUSITIS: Primary | ICD-10-CM

## 2025-07-17 DIAGNOSIS — J02.9 SORE THROAT: ICD-10-CM

## 2025-07-17 LAB
DEPRECATED S PYO AG THROAT QL EIA: NEGATIVE
S PYO DNA THROAT QL NAA+PROBE: NOT DETECTED

## 2025-07-17 ASSESSMENT — PAIN SCALES - GENERAL: PAINLEVEL_OUTOF10: MILD PAIN (2)

## 2025-07-17 NOTE — PROGRESS NOTES
Urgent Care Clinic Visit    Chief Complaint   Patient presents with    Urgent Care    Cold Symptoms     Headaches / head pressure sob with chest tightness mainly mouth breathing on and off sinus pressure - no fever x's 5 days                  7/17/2025    10:28 AM   Additional Questions   Roomed by ca   Accompanied by self         7/17/2025   Forms   Any forms needing to be completed Yes

## 2025-07-17 NOTE — PATIENT INSTRUCTIONS
Pregnancy Safe OTC Medications    Cold/Flu  Rest, increase fluids, air humidifier.   Saline nasal drops or spray  Warm salt/water gargle  Bienvenido's VaporRub    Halls'( ) cough drops, Cepacol ( ) lozenges  Dextromethorphan (Robitussin )  Guaifenesin (Mucinex )   Do not take Sudafed, Oxymetazoline (Afrin ) nasal spray, or products that contain alcohol.    Pain/Headache  Increase fluids.   Acetaminophen (Tylenol ) - max dose 3000 mg per day    Go to the emergency room if you develop:   - chest pain  - difficulty breathing  - fever   - worsening symptoms

## 2025-07-17 NOTE — PROGRESS NOTES
Assessment & Plan     Acute rhinosinusitis  Sore throat  Presents with 5 days of nasal congestion, sore throat, headache. Currently 23 weeks pregnant. Endorsed dyspnea initially but clarified that it feels difficult to breathe due to her nasal congestion (can only breathe through her mouth). No fevers, chills, chest pain, GI symptoms, urinary symptoms.  and child are sick with similar symptoms. Eating, drinking well despite mild-moderate pain with swallowing.  VSS. Well appearing on exam. Has significant nasal congestion. Respiratory exam within normal limits. Does have pharyngeal erythema, mild tonsillar exudates, and bilateral anterior cervical lymphadenopathy. Centor score 2; did obtain Strep testing which was negative (await confirmatory PCR). Discussed this is likely viral rhinosinusitis. Reviewed return precautions at length including development of fever, shortness of breath, chest discomfort, light-headedness/dizziness, severe headache, symptoms ongoing after a total of 7-10 days, or double worsening of nasal symptoms. Reviewed suppotive cares that are considered safe during pregnancy including rest, humidified air, nasal saline, hydration, tylenol.   - sodium chloride (OCEAN) 0.65 % nasal spray  Dispense: 60 mL; Refill: 0  - Streptococcus A Rapid Screen w/Reflex to PCR - Clinic Collect       No follow-ups on file.    Kanwal Deal MD  [facility]    Corinne Jeffries is a 35 year old female who presents to clinic today for the following health issues:  Chief Complaint   Patient presents with    Urgent Care    Cold Symptoms     Headaches / head pressure sob with chest tightness mainly mouth breathing on and off sinus pressure - no fever x's 5 days            7/17/2025    10:28 AM   Additional Questions   Roomed by ca   Accompanied by self         7/17/2025   Forms   Any forms needing to be completed Yes     HPI    Reports difficulty breathing, sore throat, stuffy  "nose.   Has to breathe through mouth due to nasal congestion which is uncomfortable.   Symptoms began on Saturday. Seem to be getting worse instead of better.   No fevers, chills. No myalgias/arthralgias.   Has a headache, squeezing sensation in band like distribution around her head.   Sore throat has been stable vs getting worse.   Eating and drinking normally. Some pain with swallowing.  Denies neck stiffness.    States that it is just hard for her to breathe because she can't breathe through her nose.   She does not have shortness of breath or dyspnea on exertion.   No chest pain. No palpitations. No pleuritic chest pain.     23w2d pregnant. Follows with [facility]. Has GDMA1.   Has 1 child in addition to this pregnancy.     She is the first one that got sick. Child has a cough now. Yesterday  developed a fever.     Review of Systems  Constitutional, HEENT, cardiovascular, pulmonary, gi and gu systems are negative, except as otherwise noted.      Objective    /73   Pulse 90   Temp 98  F (36.7  C) (Oral)   Resp 22   Ht 1.575 m (5' 2\")   Wt 104.3 kg (230 lb)   LMP 01/17/2025   SpO2 96%   BMI 42.07 kg/m    Physical Exam   GENERAL: alert and no distress, appears comfortable, conversing appropriately with normal speech.  HENT: Significant nasal congestion present.  Mild pharyngeal erythema present, mild tonsillar enlargement with a small amount of exudate.  NECK: Bilateral anterior cervical lymphadenopathy, no asymmetry, masses, or scars  RESP: normal work of breathing, speaking in full sentences, lungs clear to auscultation - no rales, rhonchi or wheezes  CV: regular rate and rhythm, normal S1 S2, no S3 or S4, no murmur, click or rub, no peripheral edema  ABDOMEN: soft, nontender, no hepatosplenomegaly, no masses and bowel sounds normal  MS: no gross musculoskeletal defects noted, no edema      "

## 2025-07-21 NOTE — TELEPHONE ENCOUNTER
External -[facility]   Referring Provider: Dr. Pinky Damon  DX: GDM    Ref./rec. Were received in DM consult on 01.12.2023 at 12:01 PM     Stable on current meds

## 2025-07-22 ENCOUNTER — TRANSFERRED RECORDS (OUTPATIENT)
Dept: HEALTH INFORMATION MANAGEMENT | Facility: CLINIC | Age: 35
End: 2025-07-22
Payer: COMMERCIAL

## 2025-07-28 ENCOUNTER — PATIENT OUTREACH (OUTPATIENT)
Dept: CARE COORDINATION | Facility: CLINIC | Age: 35
End: 2025-07-28
Payer: COMMERCIAL

## 2025-07-30 ENCOUNTER — PATIENT OUTREACH (OUTPATIENT)
Dept: CARE COORDINATION | Facility: CLINIC | Age: 35
End: 2025-07-30
Payer: COMMERCIAL

## 2025-08-05 ENCOUNTER — OFFICE VISIT (OUTPATIENT)
Dept: MATERNAL FETAL MEDICINE | Facility: HOSPITAL | Age: 35
End: 2025-08-05
Attending: STUDENT IN AN ORGANIZED HEALTH CARE EDUCATION/TRAINING PROGRAM
Payer: COMMERCIAL

## 2025-08-05 ENCOUNTER — ANCILLARY PROCEDURE (OUTPATIENT)
Dept: ULTRASOUND IMAGING | Facility: HOSPITAL | Age: 35
End: 2025-08-05
Attending: STUDENT IN AN ORGANIZED HEALTH CARE EDUCATION/TRAINING PROGRAM
Payer: COMMERCIAL

## 2025-08-05 ENCOUNTER — OFFICE VISIT (OUTPATIENT)
Dept: INTERPRETER SERVICES | Facility: CLINIC | Age: 35
End: 2025-08-05
Payer: COMMERCIAL

## 2025-08-05 DIAGNOSIS — O24.410 DIET CONTROLLED GESTATIONAL DIABETES MELLITUS (GDM) IN SECOND TRIMESTER: ICD-10-CM

## 2025-08-05 DIAGNOSIS — O99.210 OBESITY DURING PREGNANCY: ICD-10-CM

## 2025-08-05 DIAGNOSIS — O36.62X0 EXCESSIVE FETAL GROWTH AFFECTING MANAGEMENT OF PREGNANCY IN SECOND TRIMESTER, SINGLE OR UNSPECIFIED FETUS: ICD-10-CM

## 2025-08-05 DIAGNOSIS — O24.410 DIET CONTROLLED GESTATIONAL DIABETES MELLITUS (GDM) IN SECOND TRIMESTER: Primary | ICD-10-CM

## 2025-08-05 PROCEDURE — 76816 OB US FOLLOW-UP PER FETUS: CPT

## 2025-08-05 PROCEDURE — T1013 SIGN LANG/ORAL INTERPRETER: HCPCS | Mod: GT

## 2025-08-28 ENCOUNTER — ALLIED HEALTH/NURSE VISIT (OUTPATIENT)
Dept: EDUCATION SERVICES | Facility: CLINIC | Age: 35
End: 2025-08-28
Payer: COMMERCIAL

## 2025-08-28 DIAGNOSIS — O24.410 DIET CONTROLLED GESTATIONAL DIABETES MELLITUS (GDM), ANTEPARTUM: Primary | ICD-10-CM

## (undated) DEVICE — CUSTOM PACK PERI GYN SMA5BPGHEA

## (undated) DEVICE — PACK MINOR SINGLE BASIN SSK3001

## (undated) DEVICE — SOL WATER IRRIG 1000ML BOTTLE 2F7114

## (undated) DEVICE — PREP POVIDONE-IODINE 7.5% SCRUB 4OZ BOTTLE MDS093945

## (undated) DEVICE — MAT FLOOR SURGICAL 40X38 0702140238

## (undated) DEVICE — Device

## (undated) DEVICE — DRSG TELFA 3X4" 1050

## (undated) DEVICE — BRIEF STRETCH XL MPS40

## (undated) DEVICE — SUCTION CANNULA UTERINE 09MM CVD 022109-10

## (undated) DEVICE — GLOVE SURG PI ULTRA TOUCH M SZ 7 LF 42670

## (undated) DEVICE — TUBING VACUUM COLLECTION 6FT 23116

## (undated) DEVICE — PREP POVIDONE IODINE SOLUTION 10% 4OZ BOTTLE 29906-004

## (undated) DEVICE — PREP DYNA-HEX 4% CHG SCRUB 4OZ BOTTLE MDS098710

## (undated) RX ORDER — GLYCOPYRROLATE 0.2 MG/ML
INJECTION, SOLUTION INTRAMUSCULAR; INTRAVENOUS
Status: DISPENSED
Start: 2024-03-25

## (undated) RX ORDER — DEXAMETHASONE SODIUM PHOSPHATE 10 MG/ML
INJECTION, EMULSION INTRAMUSCULAR; INTRAVENOUS
Status: DISPENSED
Start: 2024-03-25

## (undated) RX ORDER — ONDANSETRON 2 MG/ML
INJECTION INTRAMUSCULAR; INTRAVENOUS
Status: DISPENSED
Start: 2024-03-25

## (undated) RX ORDER — DEXMEDETOMIDINE HYDROCHLORIDE 4 UG/ML
INJECTION, SOLUTION INTRAVENOUS
Status: DISPENSED
Start: 2024-03-25